# Patient Record
Sex: MALE | Race: WHITE | Employment: FULL TIME | URBAN - METROPOLITAN AREA
[De-identification: names, ages, dates, MRNs, and addresses within clinical notes are randomized per-mention and may not be internally consistent; named-entity substitution may affect disease eponyms.]

---

## 2017-01-06 DIAGNOSIS — J32.9 SINUSITIS, UNSPECIFIED CHRONICITY, UNSPECIFIED LOCATION: Primary | ICD-10-CM

## 2017-01-06 RX ORDER — FLUTICASONE PROPIONATE 50 MCG
2 SPRAY, SUSPENSION (ML) NASAL DAILY
Qty: 1 BOTTLE | Refills: 3 | Status: SHIPPED | OUTPATIENT
Start: 2017-01-06 | End: 2017-01-12

## 2017-01-06 RX ORDER — AZITHROMYCIN 250 MG/1
TABLET, FILM COATED ORAL
Qty: 6 TABLET | Refills: 0 | Status: SHIPPED | OUTPATIENT
Start: 2017-01-06 | End: 2017-01-12 | Stop reason: ALTCHOICE

## 2017-01-11 RX ORDER — ATORVASTATIN CALCIUM 40 MG/1
TABLET, FILM COATED ORAL
Qty: 90 TABLET | Refills: 0 | Status: SHIPPED | OUTPATIENT
Start: 2017-01-11 | End: 2017-04-17

## 2017-01-12 ENCOUNTER — OFFICE VISIT (OUTPATIENT)
Dept: FAMILY MEDICINE CLINIC | Facility: CLINIC | Age: 62
End: 2017-01-12

## 2017-01-12 VITALS
SYSTOLIC BLOOD PRESSURE: 164 MMHG | HEART RATE: 72 BPM | TEMPERATURE: 99 F | DIASTOLIC BLOOD PRESSURE: 100 MMHG | RESPIRATION RATE: 16 BRPM | WEIGHT: 216 LBS | BODY MASS INDEX: 30 KG/M2

## 2017-01-12 DIAGNOSIS — J01.01 ACUTE RECURRENT MAXILLARY SINUSITIS: Primary | ICD-10-CM

## 2017-01-12 PROBLEM — J01.00 SINUSITIS, ACUTE MAXILLARY: Status: ACTIVE | Noted: 2017-01-12

## 2017-01-12 PROCEDURE — 99213 OFFICE O/P EST LOW 20 MIN: CPT | Performed by: FAMILY MEDICINE

## 2017-01-12 RX ORDER — AZITHROMYCIN 250 MG/1
TABLET, FILM COATED ORAL
Qty: 6 TABLET | Refills: 0 | Status: SHIPPED | OUTPATIENT
Start: 2017-01-12 | End: 2017-01-26 | Stop reason: ALTCHOICE

## 2017-01-12 RX ORDER — METHYLPREDNISOLONE 4 MG/1
TABLET ORAL
Qty: 1 KIT | Refills: 0 | Status: SHIPPED | OUTPATIENT
Start: 2017-01-12 | End: 2017-01-26

## 2017-01-12 NOTE — PROGRESS NOTES
HPI:   Claudine Chávez is a 62year old male who presents for upper respiratory symptoms for  3  weeks. Patient reports congestion, low grade fever, sinus pain. Initially been treated with azithromycin and fluticasone nasal spray.   There was initial impro Pulse 72  Temp(Src) 98.7 °F (37.1 °C)  Resp 16  Wt 216 lb  GENERAL: well developed, well nourished,in no apparent distress  SKIN: no rashes noted  EYES:conjunctiva are clear  HEENT: Throat appears slightly red, right TM is normal, left TM is normal, there

## 2017-01-26 ENCOUNTER — APPOINTMENT (OUTPATIENT)
Dept: GENERAL RADIOLOGY | Age: 62
End: 2017-01-26
Attending: FAMILY MEDICINE
Payer: COMMERCIAL

## 2017-01-26 ENCOUNTER — HOSPITAL ENCOUNTER (OUTPATIENT)
Age: 62
Discharge: HOME | End: 2017-01-26
Attending: FAMILY MEDICINE
Payer: COMMERCIAL

## 2017-01-26 ENCOUNTER — OFFICE VISIT (OUTPATIENT)
Dept: FAMILY MEDICINE CLINIC | Facility: CLINIC | Age: 62
End: 2017-01-26

## 2017-01-26 VITALS
DIASTOLIC BLOOD PRESSURE: 98 MMHG | HEART RATE: 74 BPM | SYSTOLIC BLOOD PRESSURE: 160 MMHG | RESPIRATION RATE: 18 BRPM | TEMPERATURE: 98 F | OXYGEN SATURATION: 100 %

## 2017-01-26 DIAGNOSIS — Z02.9 ENCOUNTERS FOR ADMINISTRATIVE PURPOSE: Primary | ICD-10-CM

## 2017-01-26 DIAGNOSIS — S60.459A FOREIGN BODY OF FINGER OF RIGHT HAND, INITIAL ENCOUNTER: Primary | ICD-10-CM

## 2017-01-26 PROCEDURE — 64450 NJX AA&/STRD OTHER PN/BRANCH: CPT

## 2017-01-26 PROCEDURE — 99213 OFFICE O/P EST LOW 20 MIN: CPT

## 2017-01-26 PROCEDURE — 73140 X-RAY EXAM OF FINGER(S): CPT

## 2017-01-26 PROCEDURE — 99203 OFFICE O/P NEW LOW 30 MIN: CPT

## 2017-01-26 RX ORDER — AMOXICILLIN AND CLAVULANATE POTASSIUM 875; 125 MG/1; MG/1
1 TABLET, FILM COATED ORAL 2 TIMES DAILY
Qty: 10 TABLET | Refills: 0 | Status: SHIPPED | OUTPATIENT
Start: 2017-01-26 | End: 2017-01-31

## 2017-01-26 NOTE — PROGRESS NOTES
Pt presented with c/o metal splinter in nail of right finger. Reports he moved his hand in his car and felt it hit something. Saw small piece of metal protruding from nail; tried to remove but thought he might have pushed in deeper.   Reports finger pain

## 2017-01-27 ENCOUNTER — OFFICE VISIT (OUTPATIENT)
Dept: RHEUMATOLOGY | Facility: CLINIC | Age: 62
End: 2017-01-27

## 2017-01-27 VITALS
BODY MASS INDEX: 29.68 KG/M2 | RESPIRATION RATE: 16 BRPM | WEIGHT: 212 LBS | HEIGHT: 71 IN | DIASTOLIC BLOOD PRESSURE: 98 MMHG | HEART RATE: 74 BPM | SYSTOLIC BLOOD PRESSURE: 160 MMHG

## 2017-01-27 DIAGNOSIS — M18.0 PRIMARY OSTEOARTHRITIS OF BOTH FIRST CARPOMETACARPAL JOINTS: Primary | ICD-10-CM

## 2017-01-27 PROCEDURE — 99213 OFFICE O/P EST LOW 20 MIN: CPT | Performed by: INTERNAL MEDICINE

## 2017-01-27 RX ORDER — UBIDECARENONE/VITAMIN E MIXED 100 MG-100
1 CAPSULE ORAL DAILY
COMMUNITY
End: 2019-01-24

## 2017-01-27 RX ORDER — ETODOLAC 500 MG/1
500 TABLET, FILM COATED ORAL 2 TIMES DAILY
Qty: 180 TABLET | Refills: 3 | Status: SHIPPED | OUTPATIENT
Start: 2017-01-27 | End: 2018-02-01

## 2017-01-27 RX ORDER — DOXEPIN HYDROCHLORIDE 50 MG/1
1 CAPSULE ORAL DAILY
Status: ON HOLD | COMMUNITY
End: 2021-06-26

## 2017-01-27 NOTE — PROGRESS NOTES
[facility]  Dr. Johnna Mohan Progress Note     Subjective:   Myrna Woods is a(n) 62year old male. Current complaints: Patient presents with:  Osteoarthritis: 1 year f/u. Pt states bilateral thumb pain continues especially left thumb.   Pt was in ER y

## 2017-01-27 NOTE — ED INITIAL ASSESSMENT (HPI)
States moved his hand outward rapidly, struck something in his car console. Later today noticed discomfort to right index fingernail edge. Dark spot visible under nail. Here for possible foreign body.

## 2017-01-27 NOTE — PATIENT INSTRUCTIONS
Current plan is to continue  etodolac 500 mg twice a day for treatment of osteoarthritis of the thumbs. There is no other medicine I guarantees regrowth of cartilage.   You could take over-the-counter glucosamine/chondroitin but even that has controversy w

## 2017-01-27 NOTE — ED PROVIDER NOTES
Patient Seen in: THE [facility] In [address] [place] [address],[address]    History   Patient presents with:  Finger Pain    Stated Complaint: R - finger F.B.    HPI  62year old male with possible foreign body in the right index finger , pt says earlier today while in the Yes                Comment: 9 drinks a week       Review of Systems    Positive for stated complaint: R - finger F.B. Other systems are as noted in HPI. Constitutional and vital signs reviewed.       All other systems reviewed and negative except as noted There is a curvilinear density which appears to be separate from the distal phalanx of the index finger and is either within the soft tissues adjacent to the finger nail or the nail itself. No fracture. No other opaque foreign bodies are demonstrated.  Inci Tab  Take 1 tablet by mouth 2 (two) times daily.   Qty: 10 tablet Refills: 0

## 2017-01-30 RX ORDER — ETODOLAC 500 MG/1
TABLET, FILM COATED ORAL
Qty: 180 TABLET | Refills: 0 | OUTPATIENT
Start: 2017-01-30

## 2017-02-16 RX ORDER — METOPROLOL SUCCINATE 50 MG/1
TABLET, EXTENDED RELEASE ORAL
Qty: 90 TABLET | Refills: 0 | Status: SHIPPED | OUTPATIENT
Start: 2017-02-16 | End: 2017-05-14

## 2017-04-18 RX ORDER — ATORVASTATIN CALCIUM 40 MG/1
TABLET, FILM COATED ORAL
Qty: 90 TABLET | Refills: 0 | Status: SHIPPED | OUTPATIENT
Start: 2017-04-18 | End: 2017-07-19

## 2017-05-08 ENCOUNTER — TELEPHONE (OUTPATIENT)
Dept: FAMILY MEDICINE CLINIC | Facility: CLINIC | Age: 62
End: 2017-05-08

## 2017-05-08 DIAGNOSIS — E78.5 HYPERLIPIDEMIA, UNSPECIFIED HYPERLIPIDEMIA TYPE: Primary | ICD-10-CM

## 2017-05-08 NOTE — TELEPHONE ENCOUNTER
LOV with Dr. Sawyer Son dated 3/00/31 with BP of 160/98. LOV with Dr. Laurent Bettencourt, 1/12/17 - 164/100. Please call for nurse BP check.

## 2017-05-15 RX ORDER — METOPROLOL SUCCINATE 50 MG/1
TABLET, EXTENDED RELEASE ORAL
Qty: 90 TABLET | Refills: 0 | Status: SHIPPED | OUTPATIENT
Start: 2017-05-15 | End: 2017-08-16

## 2017-05-18 ENCOUNTER — OFFICE VISIT (OUTPATIENT)
Dept: FAMILY MEDICINE CLINIC | Facility: CLINIC | Age: 62
End: 2017-05-18

## 2017-05-18 VITALS
DIASTOLIC BLOOD PRESSURE: 82 MMHG | WEIGHT: 218 LBS | HEART RATE: 72 BPM | TEMPERATURE: 98 F | RESPIRATION RATE: 16 BRPM | SYSTOLIC BLOOD PRESSURE: 136 MMHG | BODY MASS INDEX: 30.52 KG/M2 | HEIGHT: 71 IN

## 2017-05-18 DIAGNOSIS — Z12.5 PROSTATE CANCER SCREENING: ICD-10-CM

## 2017-05-18 DIAGNOSIS — G47.33 OSA ON CPAP: ICD-10-CM

## 2017-05-18 DIAGNOSIS — Z99.89 OSA ON CPAP: ICD-10-CM

## 2017-05-18 DIAGNOSIS — K21.9 GASTROESOPHAGEAL REFLUX DISEASE WITHOUT ESOPHAGITIS: ICD-10-CM

## 2017-05-18 DIAGNOSIS — I10 ESSENTIAL HYPERTENSION: Primary | ICD-10-CM

## 2017-05-18 DIAGNOSIS — E78.5 HYPERLIPIDEMIA, UNSPECIFIED HYPERLIPIDEMIA TYPE: ICD-10-CM

## 2017-05-18 PROCEDURE — 99214 OFFICE O/P EST MOD 30 MIN: CPT | Performed by: FAMILY MEDICINE

## 2017-05-18 NOTE — PROGRESS NOTES
HPI:   Santa Worthy is a 62year old male who presents for recheck of hyperlipidemia. Patient reports taking medications as instructed. He has had no obvious side effects. Patient presents for recheck of his hypertension.  Pt has been taking medications Omega-3 Fatty Acids (FISH OIL) 1000 MG Oral Capsule Delayed Release Take 1 capsule by mouth daily.  Disp:  Rfl: 0   Pantoprazole Sodium (PROTONIX) 40 MG Oral Tab EC TAKE 1 TABLET BY MOUTH EVERY MORNING BEFORE BREAKFAST AS NEEDED Disp: 90 tablet Rfl: 0 cpap  Prostate cancer screening      Orders Placed This Encounter  CBC W Differential W Platelet  Comp Metabolic Panel (14)  Lipid Panel  PSA    Meds & Refills for this Visit:    No prescriptions requested or ordered in this encounter  Continue previous me

## 2017-07-19 RX ORDER — ATORVASTATIN CALCIUM 40 MG/1
TABLET, FILM COATED ORAL
Qty: 90 TABLET | Refills: 0 | Status: SHIPPED | OUTPATIENT
Start: 2017-07-19 | End: 2017-10-15

## 2017-08-17 RX ORDER — METOPROLOL SUCCINATE 50 MG/1
TABLET, EXTENDED RELEASE ORAL
Qty: 90 TABLET | Refills: 0 | Status: SHIPPED | OUTPATIENT
Start: 2017-08-17 | End: 2017-11-12

## 2017-09-14 RX ORDER — PANTOPRAZOLE SODIUM 40 MG/1
TABLET, DELAYED RELEASE ORAL
Qty: 90 TABLET | Refills: 1 | Status: SHIPPED | OUTPATIENT
Start: 2017-09-14 | End: 2018-02-02

## 2017-10-16 RX ORDER — ATORVASTATIN CALCIUM 40 MG/1
TABLET, FILM COATED ORAL
Qty: 90 TABLET | Refills: 1 | Status: SHIPPED | OUTPATIENT
Start: 2017-10-16 | End: 2018-04-26

## 2017-11-13 RX ORDER — METOPROLOL SUCCINATE 50 MG/1
TABLET, EXTENDED RELEASE ORAL
Qty: 90 TABLET | Refills: 0 | Status: SHIPPED | OUTPATIENT
Start: 2017-11-13 | End: 2017-12-21

## 2017-12-21 ENCOUNTER — PATIENT MESSAGE (OUTPATIENT)
Dept: FAMILY MEDICINE CLINIC | Facility: CLINIC | Age: 62
End: 2017-12-21

## 2017-12-21 ENCOUNTER — OFFICE VISIT (OUTPATIENT)
Dept: FAMILY MEDICINE CLINIC | Facility: CLINIC | Age: 62
End: 2017-12-21

## 2017-12-21 DIAGNOSIS — I10 ESSENTIAL HYPERTENSION: ICD-10-CM

## 2017-12-21 DIAGNOSIS — Z99.89 OSA ON CPAP: ICD-10-CM

## 2017-12-21 DIAGNOSIS — R17 ELEVATED BILIRUBIN: ICD-10-CM

## 2017-12-21 DIAGNOSIS — E66.9 OBESITY (BMI 30-39.9): ICD-10-CM

## 2017-12-21 DIAGNOSIS — Z13.89 SCREENING FOR GENITOURINARY CONDITION: ICD-10-CM

## 2017-12-21 DIAGNOSIS — N52.9 ERECTILE DYSFUNCTION, UNSPECIFIED ERECTILE DYSFUNCTION TYPE: ICD-10-CM

## 2017-12-21 DIAGNOSIS — G47.33 OSA ON CPAP: ICD-10-CM

## 2017-12-21 DIAGNOSIS — Z12.11 ENCOUNTER FOR SCREENING FECAL OCCULT BLOOD TESTING: ICD-10-CM

## 2017-12-21 DIAGNOSIS — Z00.00 ANNUAL PHYSICAL EXAM: Primary | ICD-10-CM

## 2017-12-21 DIAGNOSIS — L82.1 SEBORRHEIC KERATOSIS: ICD-10-CM

## 2017-12-21 DIAGNOSIS — R82.90 ABNORMAL URINE: ICD-10-CM

## 2017-12-21 DIAGNOSIS — Z23 NEED FOR VACCINATION: ICD-10-CM

## 2017-12-21 PROBLEM — J01.00 SINUSITIS, ACUTE MAXILLARY: Status: RESOLVED | Noted: 2017-01-12 | Resolved: 2017-12-21

## 2017-12-21 PROBLEM — J32.9 SINUSITIS: Status: RESOLVED | Noted: 2017-01-06 | Resolved: 2017-12-21

## 2017-12-21 PROCEDURE — 99396 PREV VISIT EST AGE 40-64: CPT | Performed by: NURSE PRACTITIONER

## 2017-12-21 PROCEDURE — 90471 IMMUNIZATION ADMIN: CPT | Performed by: NURSE PRACTITIONER

## 2017-12-21 PROCEDURE — 90686 IIV4 VACC NO PRSV 0.5 ML IM: CPT | Performed by: NURSE PRACTITIONER

## 2017-12-21 PROCEDURE — 81003 URINALYSIS AUTO W/O SCOPE: CPT | Performed by: NURSE PRACTITIONER

## 2017-12-21 PROCEDURE — 17110 DESTRUCTION B9 LES UP TO 14: CPT | Performed by: NURSE PRACTITIONER

## 2017-12-21 RX ORDER — METOPROLOL SUCCINATE 50 MG/1
50 TABLET, EXTENDED RELEASE ORAL
Qty: 90 TABLET | Refills: 0 | Status: SHIPPED | OUTPATIENT
Start: 2017-12-21 | End: 2018-06-03

## 2017-12-21 RX ORDER — SILDENAFIL 100 MG/1
100 TABLET, FILM COATED ORAL
Qty: 2 TABLET | Refills: 0 | COMMUNITY
Start: 2017-12-21 | End: 2018-05-31 | Stop reason: DRUGHIGH

## 2017-12-21 RX ORDER — SILDENAFIL 50 MG/1
50 TABLET, FILM COATED ORAL
Qty: 2 TABLET | Refills: 0 | Status: ON HOLD | COMMUNITY
Start: 2017-12-21 | End: 2018-12-17

## 2017-12-21 NOTE — H&P
Kiersten Mcdonnell is a 65year old male who presents for a complete physical exam.   HPI:   Pt denies any complaints. He reports his last colonoscopy was in 2016- and he is due in 2021 (5 year). He denies any changes in urination.  He does report erectile dys CHLORIDE 104 98 - 110 mmol/L   CARBON DIOXIDE 26 19 - 30 mmol/L   CALCIUM 9.1 8.6 - 10.3 mg/dL   PROTEIN, TOTAL 6.6 6.1 - 8.1 g/dL   ALBUMIN 4.2 3.6 - 5.1 g/dL   GLOBULIN 2.4 1.9 - 3.7 g/dL (calc)   ALBUMIN/GLOBULIN RATIO 1.8 1.0 - 2.5 (calc)   BILIRUBIN MD;  Location: [address] [place] [address]   Family History   Problem Relation Age of Onset   • Colon Cancer Father 79   • Cancer Father      started as colon cancer-moved to bone then tumor on kidney   • Cancer Mother      breast cancer.  mets voice  SKIN: no rashes, no suspicious moles. (1) seborrheic keratosis noted to pt's mid back. The risks of cryotherapy were discussed with the patient including scarring, blistering, infection and hypopigmentation of the skin.  Verbal consent given by the p days a week for cardiovascular fitness and 45-60 minutes 6-7 days a week for weight loss. Advised testicular self exam once a month. There are no Patient Instructions on file for this visit. No orders of the defined types were placed in this encounter. for any side effects of medication. 4. Erectile dysfunction, unspecified erectile dysfunction type  2 samples of Viagra were provided to the patient. Pt instructed on use of medication. 5. SHIV on CPAP  Continue wearing CPAP. 6. Obesity (30-39. 9)

## 2017-12-22 VITALS
SYSTOLIC BLOOD PRESSURE: 117 MMHG | HEART RATE: 80 BPM | BODY MASS INDEX: 30.52 KG/M2 | DIASTOLIC BLOOD PRESSURE: 86 MMHG | HEIGHT: 71 IN | WEIGHT: 218 LBS | TEMPERATURE: 98 F | RESPIRATION RATE: 18 BRPM

## 2017-12-22 NOTE — TELEPHONE ENCOUNTER
From: Cele Maki  To: SHELLY Wilson  Sent: 12/21/2017 7:59 PM CST  Subject: Visit Follow-up Question    My blood pressure this evening was 117/86.   Bar Kinney

## 2018-01-14 ENCOUNTER — PATIENT MESSAGE (OUTPATIENT)
Dept: FAMILY MEDICINE CLINIC | Facility: CLINIC | Age: 63
End: 2018-01-14

## 2018-01-17 NOTE — TELEPHONE ENCOUNTER
Spoke with pt per Eladio. Informed pt to repeat urinanalysis in two week and all other labs are within normal limits. Will be sending a copy of lab order via mail. Pt voiced understanding no other questions or concerns at this time.

## 2018-02-01 ENCOUNTER — OFFICE VISIT (OUTPATIENT)
Dept: RHEUMATOLOGY | Facility: CLINIC | Age: 63
End: 2018-02-01

## 2018-02-01 VITALS
WEIGHT: 220 LBS | HEART RATE: 60 BPM | BODY MASS INDEX: 31 KG/M2 | SYSTOLIC BLOOD PRESSURE: 138 MMHG | RESPIRATION RATE: 16 BRPM | DIASTOLIC BLOOD PRESSURE: 80 MMHG

## 2018-02-01 DIAGNOSIS — M18.0 PRIMARY OSTEOARTHRITIS OF BOTH FIRST CARPOMETACARPAL JOINTS: Primary | ICD-10-CM

## 2018-02-01 PROCEDURE — 99213 OFFICE O/P EST LOW 20 MIN: CPT | Performed by: INTERNAL MEDICINE

## 2018-02-01 RX ORDER — ETODOLAC 500 MG/1
500 TABLET, FILM COATED ORAL 2 TIMES DAILY
Qty: 180 TABLET | Refills: 3 | Status: SHIPPED | OUTPATIENT
Start: 2018-02-01 | End: 2018-04-25

## 2018-02-01 NOTE — PROGRESS NOTES
[facility]  Dr. Kelvin Rivera Progress Note     Subjective:   Lee Kimble is a(n) 65year old male. Current complaints: Patient presents with:  Osteoarthritis: 1 year f/u. Pt states 'is doing the same.'   In the fall had a bad stretch.   Trying to ayla

## 2018-02-01 NOTE — PATIENT INSTRUCTIONS
Continue Etodolac 500 mg twice a day. Use your thumb splints. Exercise regularly. Avoid stressing your joints. Return to office 1 year.

## 2018-02-02 RX ORDER — PANTOPRAZOLE SODIUM 40 MG/1
TABLET, DELAYED RELEASE ORAL
Qty: 90 TABLET | Refills: 0 | Status: SHIPPED | OUTPATIENT
Start: 2018-02-02 | End: 2018-12-26

## 2018-02-12 RX ORDER — METOPROLOL SUCCINATE 50 MG/1
TABLET, EXTENDED RELEASE ORAL
Qty: 90 TABLET | Refills: 0 | Status: SHIPPED | OUTPATIENT
Start: 2018-02-12 | End: 2018-04-26

## 2018-02-19 ENCOUNTER — TELEPHONE (OUTPATIENT)
Dept: FAMILY MEDICINE CLINIC | Facility: CLINIC | Age: 63
End: 2018-02-19

## 2018-02-19 DIAGNOSIS — R82.90 ABNORMAL URINE: Primary | ICD-10-CM

## 2018-02-19 NOTE — TELEPHONE ENCOUNTER
Received patient's UA results from [facility]. Urobilinogen noted to be high. Otherwise, unremarkable UA. Discussed with Dr. Bruce Mora. Would like patient to have a hepatitis panel drawn and an US of his abdomen-to rule out fatty liver. Orders placed.

## 2018-02-19 NOTE — TELEPHONE ENCOUNTER
Call to pt's cell reaches identified voice mail. Left m req call back to triage nurse for test results and dr instructions. Provided ofc phone hours.

## 2018-02-20 NOTE — TELEPHONE ENCOUNTER
Incoming call from patient, information and recommendations given, understanding verbalized, no further questions. Requests lab orders be mailed to his home.   Central scheduling contact number provided

## 2018-03-01 ENCOUNTER — HOSPITAL ENCOUNTER (OUTPATIENT)
Dept: ULTRASOUND IMAGING | Facility: HOSPITAL | Age: 63
Discharge: HOME | End: 2018-03-01
Attending: NURSE PRACTITIONER
Payer: COMMERCIAL

## 2018-03-01 DIAGNOSIS — R82.90 ABNORMAL URINE: ICD-10-CM

## 2018-03-01 PROCEDURE — 76700 US EXAM ABDOM COMPLETE: CPT | Performed by: NURSE PRACTITIONER

## 2018-03-06 ENCOUNTER — TELEPHONE (OUTPATIENT)
Dept: FAMILY MEDICINE CLINIC | Facility: CLINIC | Age: 63
End: 2018-03-06

## 2018-03-06 ENCOUNTER — PATIENT MESSAGE (OUTPATIENT)
Dept: FAMILY MEDICINE CLINIC | Facility: CLINIC | Age: 63
End: 2018-03-06

## 2018-03-06 NOTE — TELEPHONE ENCOUNTER
Received patient's lab work he had completed with [facility]. Hepatitis panel is negative. No evidence of hepatitis A or hepatitis B infection.

## 2018-03-15 ENCOUNTER — HOSPITAL ENCOUNTER (OUTPATIENT)
Dept: CV DIAGNOSTICS | Facility: HOSPITAL | Age: 63
Discharge: HOME | End: 2018-03-15
Attending: FAMILY MEDICINE
Payer: COMMERCIAL

## 2018-03-15 DIAGNOSIS — I71.4 ABDOMINAL AORTIC ANEURYSM (AAA) 35 TO 39 MM IN DIAMETER (HCC): ICD-10-CM

## 2018-03-15 DIAGNOSIS — R93.89 ABNORMAL ULTRASOUND: ICD-10-CM

## 2018-03-15 PROCEDURE — 93306 TTE W/DOPPLER COMPLETE: CPT | Performed by: FAMILY MEDICINE

## 2018-04-05 ENCOUNTER — PATIENT MESSAGE (OUTPATIENT)
Dept: FAMILY MEDICINE CLINIC | Facility: CLINIC | Age: 63
End: 2018-04-05

## 2018-04-05 DIAGNOSIS — E78.5 HYPERLIPIDEMIA, UNSPECIFIED HYPERLIPIDEMIA TYPE: Primary | ICD-10-CM

## 2018-04-05 NOTE — TELEPHONE ENCOUNTER
From: Ana Rosa Wilson  To: Viji Dobbs MD  Sent: 4/5/2018 6:45 AM CDT  Subject: Non-Urgent Medical Question    Bety Dominguez, please mail me my Lipid Panel order for my upcoming blood test.    Thanks,   Dayanna Clayton

## 2018-04-05 NOTE — TELEPHONE ENCOUNTER
Pt is requesting an order for a lipid panel be mailed to him. The order is pended for review.   Thank you

## 2018-04-20 ENCOUNTER — TELEPHONE (OUTPATIENT)
Dept: FAMILY MEDICINE CLINIC | Facility: CLINIC | Age: 63
End: 2018-04-20

## 2018-04-25 ENCOUNTER — TELEPHONE (OUTPATIENT)
Dept: RHEUMATOLOGY | Facility: CLINIC | Age: 63
End: 2018-04-25

## 2018-04-25 RX ORDER — ETODOLAC 500 MG/1
500 TABLET, FILM COATED ORAL 2 TIMES DAILY
Qty: 180 TABLET | Refills: 0 | Status: SHIPPED | OUTPATIENT
Start: 2018-04-25 | End: 2018-06-19

## 2018-04-25 NOTE — TELEPHONE ENCOUNTER
Rec'd refill request from [facility] for etodolac. Med sent.  mp     Future Appointments  Date Time Provider Kori Mendoza   5/92/7116 0:73 PM Alejandro Simmons MD [facility] MEGHANN Ron

## 2018-04-26 ENCOUNTER — TELEPHONE (OUTPATIENT)
Dept: FAMILY MEDICINE CLINIC | Facility: CLINIC | Age: 63
End: 2018-04-26

## 2018-04-26 RX ORDER — METOPROLOL SUCCINATE 50 MG/1
50 TABLET, EXTENDED RELEASE ORAL
Qty: 90 TABLET | Refills: 0 | Status: SHIPPED | OUTPATIENT
Start: 2018-04-26 | End: 2018-05-31

## 2018-04-26 RX ORDER — ATORVASTATIN CALCIUM 40 MG/1
40 TABLET, FILM COATED ORAL
Qty: 90 TABLET | Refills: 0 | Status: SHIPPED | OUTPATIENT
Start: 2018-04-26 | End: 2018-06-19

## 2018-05-31 ENCOUNTER — OFFICE VISIT (OUTPATIENT)
Dept: FAMILY MEDICINE CLINIC | Facility: CLINIC | Age: 63
End: 2018-05-31

## 2018-05-31 VITALS
TEMPERATURE: 99 F | HEART RATE: 64 BPM | DIASTOLIC BLOOD PRESSURE: 104 MMHG | BODY MASS INDEX: 31 KG/M2 | SYSTOLIC BLOOD PRESSURE: 170 MMHG | RESPIRATION RATE: 16 BRPM | WEIGHT: 220 LBS

## 2018-05-31 DIAGNOSIS — I10 ESSENTIAL HYPERTENSION: Primary | ICD-10-CM

## 2018-05-31 DIAGNOSIS — F41.9 ANXIETY: ICD-10-CM

## 2018-05-31 DIAGNOSIS — S46.911A STRAIN OF RIGHT SHOULDER, INITIAL ENCOUNTER: ICD-10-CM

## 2018-05-31 PROCEDURE — 99214 OFFICE O/P EST MOD 30 MIN: CPT | Performed by: FAMILY MEDICINE

## 2018-05-31 RX ORDER — METHYLPREDNISOLONE 4 MG/1
TABLET ORAL
Qty: 1 KIT | Refills: 0 | Status: SHIPPED | OUTPATIENT
Start: 2018-05-31 | End: 2018-12-04

## 2018-05-31 RX ORDER — ALPRAZOLAM 0.25 MG/1
0.25 TABLET ORAL EVERY 6 HOURS PRN
Qty: 30 TABLET | Refills: 1 | Status: SHIPPED | OUTPATIENT
Start: 2018-05-31 | End: 2019-01-07

## 2018-05-31 NOTE — PROGRESS NOTES
HPI:   Sandy Hancock is a 65year old male who presents for recheck of hyperlipidemia. Patient reports taking medications as instructed. He has had no obvious side effects. Patient presents for recheck of his hypertension.  Pt has been taking medications Tab Take 1 tablet (500 mg total) by mouth 2 (two) times daily.  Disp: 180 tablet Rfl: 0   PANTOPRAZOLE SODIUM 40 MG Oral Tab EC TAKE 1 TABLET BY MOUTH EVERY DAY (Patient taking differently: TAKE 1 TABLET  AS NEEDED) Disp: 90 tablet Rfl: 0   Sildenafil [facility] GENERAL: well developed, well nourished,in no apparent distress  SKIN: no rashes,no suspicious lesions  HEENT: atraumatic, normocephalic,ears and throat are clear  NECK: supple,no adenopathy,no bruits  LUNGS: clear to auscultation  CARDIO: RRR without mu

## 2018-06-03 RX ORDER — METOPROLOL SUCCINATE 50 MG/1
100 TABLET, EXTENDED RELEASE ORAL DAILY
Qty: 90 TABLET | Refills: 0 | Status: SHIPPED
Start: 2018-05-31 | End: 2018-06-04

## 2018-06-04 DIAGNOSIS — I10 ESSENTIAL HYPERTENSION: ICD-10-CM

## 2018-06-04 RX ORDER — METOPROLOL SUCCINATE 50 MG/1
TABLET, EXTENDED RELEASE ORAL
Qty: 180 TABLET | Refills: 1 | Status: SHIPPED | OUTPATIENT
Start: 2018-06-04 | End: 2018-11-24

## 2018-06-19 RX ORDER — ETODOLAC 500 MG/1
TABLET, FILM COATED ORAL
Qty: 180 TABLET | Refills: 2 | Status: SHIPPED | OUTPATIENT
Start: 2018-06-19 | End: 2018-08-02 | Stop reason: ALTCHOICE

## 2018-06-20 RX ORDER — METOPROLOL SUCCINATE 50 MG/1
TABLET, EXTENDED RELEASE ORAL
Qty: 90 TABLET | Refills: 1 | Status: SHIPPED | OUTPATIENT
Start: 2018-06-20 | End: 2018-10-18 | Stop reason: DRUGHIGH

## 2018-06-20 RX ORDER — ATORVASTATIN CALCIUM 40 MG/1
TABLET, FILM COATED ORAL
Qty: 90 TABLET | Refills: 1 | Status: SHIPPED | OUTPATIENT
Start: 2018-06-20 | End: 2018-07-30

## 2018-06-20 NOTE — TELEPHONE ENCOUNTER
Pt had cmp 12/2017, lipid in 10/5/17, both scanned in. Protocol would pass. Pt is due for vs in 6 mos per last ov note 5/31/18. Rx's sent.

## 2018-07-30 RX ORDER — ATORVASTATIN CALCIUM 40 MG/1
TABLET, FILM COATED ORAL
Qty: 90 TABLET | Refills: 0 | Status: SHIPPED | OUTPATIENT
Start: 2018-07-30 | End: 2018-10-27

## 2018-08-02 RX ORDER — CELECOXIB 200 MG/1
200 CAPSULE ORAL DAILY
Qty: 30 CAPSULE | Refills: 2 | Status: SHIPPED | OUTPATIENT
Start: 2018-08-02 | End: 2018-10-27

## 2018-10-06 ENCOUNTER — HOSPITAL ENCOUNTER (OUTPATIENT)
Dept: CT IMAGING | Facility: HOSPITAL | Age: 63
Discharge: HOME | End: 2018-10-06
Attending: FAMILY MEDICINE
Payer: COMMERCIAL

## 2018-10-06 DIAGNOSIS — I71.4 ABDOMINAL AORTIC ANEURYSM (AAA) 35 TO 39 MM IN DIAMETER (HCC): ICD-10-CM

## 2018-10-06 DIAGNOSIS — R93.89 ABNORMAL ULTRASOUND: ICD-10-CM

## 2018-10-06 PROCEDURE — 74174 CTA ABD&PLVS W/CONTRAST: CPT | Performed by: FAMILY MEDICINE

## 2018-10-06 PROCEDURE — 82565 ASSAY OF CREATININE: CPT

## 2018-10-16 ENCOUNTER — PRIOR ORIGINAL RECORDS (OUTPATIENT)
Dept: OTHER | Age: 63
End: 2018-10-16

## 2018-10-16 ENCOUNTER — MYAURORA ACCOUNT LINK (OUTPATIENT)
Dept: OTHER | Age: 63
End: 2018-10-16

## 2018-10-18 ENCOUNTER — TELEPHONE (OUTPATIENT)
Dept: FAMILY MEDICINE CLINIC | Facility: CLINIC | Age: 63
End: 2018-10-18

## 2018-10-18 DIAGNOSIS — E78.5 HYPERLIPIDEMIA, UNSPECIFIED HYPERLIPIDEMIA TYPE: Primary | ICD-10-CM

## 2018-10-18 DIAGNOSIS — I10 ESSENTIAL HYPERTENSION: ICD-10-CM

## 2018-10-18 RX ORDER — METOPROLOL SUCCINATE 50 MG/1
TABLET, EXTENDED RELEASE ORAL
Qty: 180 TABLET | Refills: 1 | Status: CANCELLED | OUTPATIENT
Start: 2018-10-18

## 2018-10-18 RX ORDER — ATORVASTATIN CALCIUM 40 MG/1
40 TABLET, FILM COATED ORAL
Qty: 90 TABLET | Refills: 0 | Status: CANCELLED | OUTPATIENT
Start: 2018-10-18

## 2018-10-18 RX ORDER — CELECOXIB 200 MG/1
200 CAPSULE ORAL DAILY
Qty: 90 CAPSULE | Refills: 0 | Status: CANCELLED | OUTPATIENT
Start: 2018-10-18 | End: 2019-01-16

## 2018-10-18 NOTE — TELEPHONE ENCOUNTER
T.C. To pt. Dr. Yaw Lopez reviewed Pt.s labs from 10/12/18. His LDL is down to 80. Pt was insrtucted to continue the medication and repeat a CMP and lipid panel in 6 months. Pt agreed to plan and verbalized understanding. Orders for labs placed.  Pt also re

## 2018-10-29 RX ORDER — ATORVASTATIN CALCIUM 40 MG/1
TABLET, FILM COATED ORAL
Qty: 90 TABLET | Refills: 0 | Status: SHIPPED | OUTPATIENT
Start: 2018-10-29 | End: 2019-01-21

## 2018-10-29 RX ORDER — CELECOXIB 200 MG/1
CAPSULE ORAL
Qty: 30 CAPSULE | Refills: 1 | Status: ON HOLD | OUTPATIENT
Start: 2018-10-29 | End: 2018-12-21

## 2018-10-29 NOTE — TELEPHONE ENCOUNTER
Labs 10/18-scanned in, protocol would pass. Due to see you end of next month for meds. Celebrex not on protocol.

## 2018-11-10 ENCOUNTER — HOSPITAL ENCOUNTER (OUTPATIENT)
Dept: CV DIAGNOSTICS | Facility: HOSPITAL | Age: 63
Discharge: HOME | End: 2018-11-10
Attending: INTERNAL MEDICINE
Payer: COMMERCIAL

## 2018-11-10 DIAGNOSIS — I10 HTN (HYPERTENSION): ICD-10-CM

## 2018-11-10 DIAGNOSIS — R07.89 ATYPICAL CHEST PAIN: ICD-10-CM

## 2018-11-10 DIAGNOSIS — Z82.49 FH: CAD (CORONARY ARTERY DISEASE): ICD-10-CM

## 2018-11-10 PROCEDURE — 78452 HT MUSCLE IMAGE SPECT MULT: CPT | Performed by: INTERNAL MEDICINE

## 2018-11-10 PROCEDURE — 93017 CV STRESS TEST TRACING ONLY: CPT | Performed by: INTERNAL MEDICINE

## 2018-11-10 PROCEDURE — 93018 CV STRESS TEST I&R ONLY: CPT | Performed by: INTERNAL MEDICINE

## 2018-11-14 ENCOUNTER — PRIOR ORIGINAL RECORDS (OUTPATIENT)
Dept: OTHER | Age: 63
End: 2018-11-14

## 2018-11-24 DIAGNOSIS — I10 ESSENTIAL HYPERTENSION: ICD-10-CM

## 2018-11-26 RX ORDER — METOPROLOL SUCCINATE 50 MG/1
TABLET, EXTENDED RELEASE ORAL
Qty: 180 TABLET | Refills: 0 | Status: ON HOLD | OUTPATIENT
Start: 2018-11-26 | End: 2018-12-21

## 2018-11-30 ENCOUNTER — PRIOR ORIGINAL RECORDS (OUTPATIENT)
Dept: OTHER | Age: 63
End: 2018-11-30

## 2018-12-01 ENCOUNTER — HOSPITAL ENCOUNTER (OUTPATIENT)
Dept: GENERAL RADIOLOGY | Age: 63
Discharge: HOME | End: 2018-12-01
Attending: INTERNAL MEDICINE
Payer: COMMERCIAL

## 2018-12-01 DIAGNOSIS — Z01.818 PREOPERATIVE TESTING: ICD-10-CM

## 2018-12-01 PROCEDURE — 71046 X-RAY EXAM CHEST 2 VIEWS: CPT | Performed by: INTERNAL MEDICINE

## 2018-12-04 NOTE — HISTORICAL OFFICE NOTE
Pamela Naveed  : 1955  ACCOUNT:  [id number]  [phone]  PCP: Dr. Alison Jones     TODAY'S DATE: 10/16/2018  DICTATED BY:  [Dr. Eryn Shore: [Followup of Aneurysm, abdominal, Followup of Chest pain, precordial and high blo rhythm, normal S1, S2 without S3; no pathologic murmurs. CAROTIDS: carotid pulses normal. ABD AORTA: abdominal aorta not enlarged. FEM: femoral pulses intact. PEDAL: pedal pulses intact. EXT: no peripheral edema.      DECISION MAKING:    []    ASSESSMENT:

## 2018-12-07 ENCOUNTER — HOSPITAL ENCOUNTER (OUTPATIENT)
Dept: INTERVENTIONAL RADIOLOGY/VASCULAR | Facility: HOSPITAL | Age: 63
Discharge: HOME | End: 2018-12-07
Attending: INTERNAL MEDICINE | Admitting: INTERNAL MEDICINE
Payer: COMMERCIAL

## 2018-12-07 VITALS
TEMPERATURE: 98 F | RESPIRATION RATE: 20 BRPM | HEART RATE: 54 BPM | WEIGHT: 220 LBS | SYSTOLIC BLOOD PRESSURE: 118 MMHG | DIASTOLIC BLOOD PRESSURE: 70 MMHG | HEIGHT: 71 IN | BODY MASS INDEX: 30.8 KG/M2 | OXYGEN SATURATION: 97 %

## 2018-12-07 DIAGNOSIS — R07.9 CHEST PAIN: ICD-10-CM

## 2018-12-07 PROCEDURE — 4A023N7 MEASUREMENT OF CARDIAC SAMPLING AND PRESSURE, LEFT HEART, PERCUTANEOUS APPROACH: ICD-10-PCS | Performed by: INTERNAL MEDICINE

## 2018-12-07 PROCEDURE — 93458 L HRT ARTERY/VENTRICLE ANGIO: CPT | Performed by: INTERNAL MEDICINE

## 2018-12-07 PROCEDURE — 92978 ENDOLUMINL IVUS OCT C 1ST: CPT | Performed by: INTERNAL MEDICINE

## 2018-12-07 PROCEDURE — 99152 MOD SED SAME PHYS/QHP 5/>YRS: CPT

## 2018-12-07 PROCEDURE — 93458 L HRT ARTERY/VENTRICLE ANGIO: CPT

## 2018-12-07 PROCEDURE — B2151ZZ FLUOROSCOPY OF LEFT HEART USING LOW OSMOLAR CONTRAST: ICD-10-PCS | Performed by: INTERNAL MEDICINE

## 2018-12-07 PROCEDURE — B2111ZZ FLUOROSCOPY OF MULTIPLE CORONARY ARTERIES USING LOW OSMOLAR CONTRAST: ICD-10-PCS | Performed by: INTERNAL MEDICINE

## 2018-12-07 PROCEDURE — 92978 ENDOLUMINL IVUS OCT C 1ST: CPT

## 2018-12-07 PROCEDURE — 99153 MOD SED SAME PHYS/QHP EA: CPT

## 2018-12-07 PROCEDURE — 92979 ENDOLUMINL IVUS OCT C EA: CPT

## 2018-12-07 RX ORDER — LIDOCAINE HYDROCHLORIDE 10 MG/ML
INJECTION, SOLUTION EPIDURAL; INFILTRATION; INTRACAUDAL; PERINEURAL
Status: COMPLETED
Start: 2018-12-07 | End: 2018-12-07

## 2018-12-07 RX ORDER — SODIUM CHLORIDE 9 MG/ML
INJECTION, SOLUTION INTRAVENOUS CONTINUOUS
Status: DISCONTINUED | OUTPATIENT
Start: 2018-12-07 | End: 2018-12-07

## 2018-12-07 RX ORDER — MIDAZOLAM HYDROCHLORIDE 1 MG/ML
INJECTION INTRAMUSCULAR; INTRAVENOUS
Status: COMPLETED
Start: 2018-12-07 | End: 2018-12-07

## 2018-12-07 RX ORDER — HEPARIN SODIUM 5000 [USP'U]/ML
INJECTION, SOLUTION INTRAVENOUS; SUBCUTANEOUS
Status: COMPLETED
Start: 2018-12-07 | End: 2018-12-07

## 2018-12-07 RX ADMIN — SODIUM CHLORIDE: 9 INJECTION, SOLUTION INTRAVENOUS at 10:00:00

## 2018-12-07 NOTE — PROGRESS NOTES
Pt ambulated in [place], rt groin remains c/d/i and soft. Discharge instructions reviewed with pt. And spouse. Pt seen by Francine Lesch PA with Dr. Blossom Andre. [address] [facility] office will call him on Monday. Card given. IV d/cd

## 2018-12-07 NOTE — H&P
History & Physical Examination    Patient Name: Palomo Oquendo  MRN: [id number]  [facility]: [id number]  YOB: 1955    Diagnosis: abdominal aneurysm, chest pain, HTN    Present Illness: Mr Isreal Kaye presents to the hospital for a scheduled coronary cat Release Take 1 capsule by mouth daily.  Disp:  Rfl: 0 Taking       Current Facility-Administered Medications:  0.9%  NaCl infusion  Intravenous Continuous       Allergies: No Known Allergies    Past Medical History:   Diagnosis Date   • High blood pressure

## 2018-12-07 NOTE — HISTORICAL OFFICE NOTE
Sybil Chung  : 1955  ACCOUNT:  [id number]  [phone]  PCP: Dr. Sonido Teran     TODAY'S DATE: 10/16/2018  DICTATED BY:  [Dr. Shyla Mittal: [high blood pressure.]    HPI:    [On 10/16/2018, Michelet Hou, a 59year old ma CAROTIDS: carotid pulses normal. ABD AORTA: abdominal aorta not enlarged. FEM: femoral pulses intact. PEDAL: pedal pulses intact. EXT: no peripheral edema. DECISION MAKING:    [] Patient with asymptomatic abdominal aneurysm 3.97.   Will we will repeat a

## 2018-12-11 ENCOUNTER — LAB ENCOUNTER (OUTPATIENT)
Dept: LAB | Facility: HOSPITAL | Age: 63
End: 2018-12-11
Attending: THORACIC SURGERY (CARDIOTHORACIC VASCULAR SURGERY)
Payer: COMMERCIAL

## 2018-12-11 ENCOUNTER — HOSPITAL ENCOUNTER (OUTPATIENT)
Dept: ULTRASOUND IMAGING | Facility: HOSPITAL | Age: 63
Discharge: HOME | End: 2018-12-11
Attending: THORACIC SURGERY (CARDIOTHORACIC VASCULAR SURGERY)
Payer: COMMERCIAL

## 2018-12-11 DIAGNOSIS — Z01.818 PRE-OP EVALUATION: ICD-10-CM

## 2018-12-11 DIAGNOSIS — R09.89 BRUIT: ICD-10-CM

## 2018-12-11 PROCEDURE — 85610 PROTHROMBIN TIME: CPT

## 2018-12-11 PROCEDURE — 83036 HEMOGLOBIN GLYCOSYLATED A1C: CPT

## 2018-12-11 PROCEDURE — 36415 COLL VENOUS BLD VENIPUNCTURE: CPT

## 2018-12-11 PROCEDURE — 85730 THROMBOPLASTIN TIME PARTIAL: CPT

## 2018-12-11 PROCEDURE — 81003 URINALYSIS AUTO W/O SCOPE: CPT

## 2018-12-11 PROCEDURE — 86901 BLOOD TYPING SEROLOGIC RH(D): CPT

## 2018-12-11 PROCEDURE — 80053 COMPREHEN METABOLIC PANEL: CPT

## 2018-12-11 PROCEDURE — 86850 RBC ANTIBODY SCREEN: CPT

## 2018-12-11 PROCEDURE — 85025 COMPLETE CBC W/AUTO DIFF WBC: CPT

## 2018-12-11 PROCEDURE — 86900 BLOOD TYPING SEROLOGIC ABO: CPT

## 2018-12-11 PROCEDURE — 93880 EXTRACRANIAL BILAT STUDY: CPT | Performed by: THORACIC SURGERY (CARDIOTHORACIC VASCULAR SURGERY)

## 2018-12-12 RX ORDER — MULTIVIT WITH MINERALS/LUTEIN
1000 TABLET ORAL DAILY
COMMUNITY
End: 2019-01-24

## 2018-12-14 ENCOUNTER — ANESTHESIA EVENT (OUTPATIENT)
Dept: CARDIAC SURGERY | Facility: HOSPITAL | Age: 63
DRG: 236 | End: 2018-12-14
Payer: COMMERCIAL

## 2018-12-17 ENCOUNTER — ANESTHESIA (OUTPATIENT)
Dept: CARDIAC SURGERY | Facility: HOSPITAL | Age: 63
DRG: 236 | End: 2018-12-17
Payer: COMMERCIAL

## 2018-12-17 ENCOUNTER — APPOINTMENT (OUTPATIENT)
Dept: GENERAL RADIOLOGY | Facility: HOSPITAL | Age: 63
DRG: 236 | End: 2018-12-17
Attending: THORACIC SURGERY (CARDIOTHORACIC VASCULAR SURGERY)
Payer: COMMERCIAL

## 2018-12-17 ENCOUNTER — HOSPITAL ENCOUNTER (INPATIENT)
Facility: HOSPITAL | Age: 63
LOS: 4 days | Discharge: HOME HEALTH | DRG: 236 | End: 2018-12-21
Attending: THORACIC SURGERY (CARDIOTHORACIC VASCULAR SURGERY) | Admitting: THORACIC SURGERY (CARDIOTHORACIC VASCULAR SURGERY)
Payer: COMMERCIAL

## 2018-12-17 DIAGNOSIS — I25.10 CAD (CORONARY ARTERY DISEASE): Primary | ICD-10-CM

## 2018-12-17 DIAGNOSIS — I10 ESSENTIAL HYPERTENSION: ICD-10-CM

## 2018-12-17 DIAGNOSIS — Z01.818 PREOP TESTING: ICD-10-CM

## 2018-12-17 PROCEDURE — 99251 INITIAL INPATIENT CONSULT,LEVL I: CPT | Performed by: HOSPITALIST

## 2018-12-17 PROCEDURE — 06BQ4ZZ EXCISION OF LEFT SAPHENOUS VEIN, PERCUTANEOUS ENDOSCOPIC APPROACH: ICD-10-PCS | Performed by: THORACIC SURGERY (CARDIOTHORACIC VASCULAR SURGERY)

## 2018-12-17 PROCEDURE — 02100Z9 BYPASS CORONARY ARTERY, ONE ARTERY FROM LEFT INTERNAL MAMMARY, OPEN APPROACH: ICD-10-PCS | Performed by: THORACIC SURGERY (CARDIOTHORACIC VASCULAR SURGERY)

## 2018-12-17 PROCEDURE — 06BP4ZZ EXCISION OF RIGHT SAPHENOUS VEIN, PERCUTANEOUS ENDOSCOPIC APPROACH: ICD-10-PCS | Performed by: THORACIC SURGERY (CARDIOTHORACIC VASCULAR SURGERY)

## 2018-12-17 PROCEDURE — 5A1221Z PERFORMANCE OF CARDIAC OUTPUT, CONTINUOUS: ICD-10-PCS | Performed by: THORACIC SURGERY (CARDIOTHORACIC VASCULAR SURGERY)

## 2018-12-17 PROCEDURE — 71045 X-RAY EXAM CHEST 1 VIEW: CPT | Performed by: THORACIC SURGERY (CARDIOTHORACIC VASCULAR SURGERY)

## 2018-12-17 PROCEDURE — 021109W BYPASS CORONARY ARTERY, TWO ARTERIES FROM AORTA WITH AUTOLOGOUS VENOUS TISSUE, OPEN APPROACH: ICD-10-PCS | Performed by: THORACIC SURGERY (CARDIOTHORACIC VASCULAR SURGERY)

## 2018-12-17 RX ORDER — DOCUSATE SODIUM 100 MG/1
100 CAPSULE, LIQUID FILLED ORAL 2 TIMES DAILY
Status: DISCONTINUED | OUTPATIENT
Start: 2018-12-17 | End: 2018-12-21

## 2018-12-17 RX ORDER — MORPHINE SULFATE 4 MG/ML
4 INJECTION, SOLUTION INTRAMUSCULAR; INTRAVENOUS
Status: DISCONTINUED | OUTPATIENT
Start: 2018-12-17 | End: 2018-12-21

## 2018-12-17 RX ORDER — MAGNESIUM SULFATE 1 G/100ML
1 INJECTION INTRAVENOUS AS NEEDED
Status: DISCONTINUED | OUTPATIENT
Start: 2018-12-17 | End: 2018-12-20 | Stop reason: HOSPADM

## 2018-12-17 RX ORDER — NITROGLYCERIN 20 MG/100ML
INJECTION INTRAVENOUS CONTINUOUS PRN
Status: DISCONTINUED | OUTPATIENT
Start: 2018-12-17 | End: 2018-12-21

## 2018-12-17 RX ORDER — VANCOMYCIN HYDROCHLORIDE 1 G/20ML
INJECTION, POWDER, LYOPHILIZED, FOR SOLUTION INTRAVENOUS
Status: DISCONTINUED | OUTPATIENT
Start: 2018-12-17 | End: 2018-12-17 | Stop reason: HOSPADM

## 2018-12-17 RX ORDER — TEMAZEPAM 15 MG/1
15 CAPSULE ORAL NIGHTLY PRN
Status: DISCONTINUED | OUTPATIENT
Start: 2018-12-17 | End: 2018-12-21

## 2018-12-17 RX ORDER — ALBUMIN, HUMAN INJ 5% 5 %
250 SOLUTION INTRAVENOUS ONCE AS NEEDED
Status: COMPLETED | OUTPATIENT
Start: 2018-12-17 | End: 2018-12-17

## 2018-12-17 RX ORDER — MAGNESIUM SULFATE HEPTAHYDRATE 40 MG/ML
2 INJECTION, SOLUTION INTRAVENOUS AS NEEDED
Status: DISCONTINUED | OUTPATIENT
Start: 2018-12-17 | End: 2018-12-20 | Stop reason: HOSPADM

## 2018-12-17 RX ORDER — MIDAZOLAM HYDROCHLORIDE 1 MG/ML
1 INJECTION INTRAMUSCULAR; INTRAVENOUS EVERY 30 MIN PRN
Status: DISCONTINUED | OUTPATIENT
Start: 2018-12-17 | End: 2018-12-18

## 2018-12-17 RX ORDER — DEXTROSE MONOHYDRATE 25 G/50ML
50 INJECTION, SOLUTION INTRAVENOUS
Status: DISCONTINUED | OUTPATIENT
Start: 2018-12-17 | End: 2018-12-18

## 2018-12-17 RX ORDER — HYDROCODONE BITARTRATE AND ACETAMINOPHEN 10; 325 MG/1; MG/1
2 TABLET ORAL EVERY 4 HOURS PRN
Status: DISCONTINUED | OUTPATIENT
Start: 2018-12-17 | End: 2018-12-20

## 2018-12-17 RX ORDER — DEXTROSE AND SODIUM CHLORIDE 5; .45 G/100ML; G/100ML
INJECTION, SOLUTION INTRAVENOUS CONTINUOUS
Status: ACTIVE | OUTPATIENT
Start: 2018-12-17 | End: 2018-12-18

## 2018-12-17 RX ORDER — DEXMEDETOMIDINE HYDROCHLORIDE 4 UG/ML
INJECTION, SOLUTION INTRAVENOUS CONTINUOUS
Status: DISCONTINUED | OUTPATIENT
Start: 2018-12-17 | End: 2018-12-18

## 2018-12-17 RX ORDER — ASPIRIN 325 MG
325 TABLET ORAL ONCE
Status: COMPLETED | OUTPATIENT
Start: 2018-12-17 | End: 2018-12-17

## 2018-12-17 RX ORDER — ASPIRIN 300 MG
300 SUPPOSITORY, RECTAL RECTAL DAILY
Status: DISCONTINUED | OUTPATIENT
Start: 2018-12-18 | End: 2018-12-19

## 2018-12-17 RX ORDER — DOBUTAMINE HYDROCHLORIDE 200 MG/100ML
INJECTION INTRAVENOUS CONTINUOUS PRN
Status: DISCONTINUED | OUTPATIENT
Start: 2018-12-17 | End: 2018-12-21

## 2018-12-17 RX ORDER — ATORVASTATIN CALCIUM 40 MG/1
40 TABLET, FILM COATED ORAL NIGHTLY
Status: DISCONTINUED | OUTPATIENT
Start: 2018-12-17 | End: 2018-12-21

## 2018-12-17 RX ORDER — SODIUM CHLORIDE 9 MG/ML
INJECTION, SOLUTION INTRAVENOUS CONTINUOUS
Status: DISCONTINUED | OUTPATIENT
Start: 2018-12-17 | End: 2018-12-19

## 2018-12-17 RX ORDER — DEXTROSE AND SODIUM CHLORIDE 5; .45 G/100ML; G/100ML
INJECTION, SOLUTION INTRAVENOUS CONTINUOUS
Status: ACTIVE | OUTPATIENT
Start: 2018-12-17 | End: 2018-12-17

## 2018-12-17 RX ORDER — CHLORHEXIDINE GLUCONATE 0.12 MG/ML
15 RINSE ORAL
Status: DISCONTINUED | OUTPATIENT
Start: 2018-12-17 | End: 2018-12-18

## 2018-12-17 RX ORDER — HYDROCODONE BITARTRATE AND ACETAMINOPHEN 10; 325 MG/1; MG/1
1 TABLET ORAL EVERY 4 HOURS PRN
Status: DISCONTINUED | OUTPATIENT
Start: 2018-12-17 | End: 2018-12-20

## 2018-12-17 RX ORDER — POLYETHYLENE GLYCOL 3350 17 G/17G
1 POWDER, FOR SOLUTION ORAL DAILY PRN
Status: DISCONTINUED | OUTPATIENT
Start: 2018-12-17 | End: 2018-12-21

## 2018-12-17 RX ORDER — ONDANSETRON 2 MG/ML
4 INJECTION INTRAMUSCULAR; INTRAVENOUS EVERY 6 HOURS PRN
Status: DISCONTINUED | OUTPATIENT
Start: 2018-12-17 | End: 2018-12-21

## 2018-12-17 RX ORDER — FAMOTIDINE 10 MG/ML
20 INJECTION, SOLUTION INTRAVENOUS 2 TIMES DAILY
Status: DISCONTINUED | OUTPATIENT
Start: 2018-12-17 | End: 2018-12-19

## 2018-12-17 RX ORDER — CEFAZOLIN SODIUM 1 G/3ML
INJECTION, POWDER, FOR SOLUTION INTRAMUSCULAR; INTRAVENOUS
Status: DISCONTINUED | OUTPATIENT
Start: 2018-12-17 | End: 2018-12-17 | Stop reason: HOSPADM

## 2018-12-17 RX ORDER — ALPRAZOLAM 0.25 MG/1
0.25 TABLET ORAL EVERY 6 HOURS PRN
Status: DISCONTINUED | OUTPATIENT
Start: 2018-12-17 | End: 2018-12-21

## 2018-12-17 RX ORDER — ASPIRIN 325 MG
325 TABLET, DELAYED RELEASE (ENTERIC COATED) ORAL DAILY
Status: DISCONTINUED | OUTPATIENT
Start: 2018-12-18 | End: 2018-12-21

## 2018-12-17 RX ORDER — POTASSIUM CHLORIDE 29.8 MG/ML
40 INJECTION INTRAVENOUS AS NEEDED
Status: DISCONTINUED | OUTPATIENT
Start: 2018-12-17 | End: 2018-12-20 | Stop reason: HOSPADM

## 2018-12-17 RX ORDER — MORPHINE SULFATE 4 MG/ML
8 INJECTION, SOLUTION INTRAMUSCULAR; INTRAVENOUS
Status: DISCONTINUED | OUTPATIENT
Start: 2018-12-17 | End: 2018-12-21

## 2018-12-17 RX ORDER — ASPIRIN 300 MG
300 SUPPOSITORY, RECTAL RECTAL ONCE
Status: COMPLETED | OUTPATIENT
Start: 2018-12-17 | End: 2018-12-17

## 2018-12-17 RX ORDER — CEFAZOLIN SODIUM/WATER 2 G/20 ML
2 SYRINGE (ML) INTRAVENOUS EVERY 8 HOURS
Status: COMPLETED | OUTPATIENT
Start: 2018-12-17 | End: 2018-12-19

## 2018-12-17 RX ORDER — ATORVASTATIN CALCIUM 10 MG/1
10 TABLET, FILM COATED ORAL NIGHTLY
Status: DISCONTINUED | OUTPATIENT
Start: 2018-12-17 | End: 2018-12-17

## 2018-12-17 RX ORDER — POTASSIUM CHLORIDE 14.9 MG/ML
20 INJECTION INTRAVENOUS AS NEEDED
Status: DISCONTINUED | OUTPATIENT
Start: 2018-12-17 | End: 2018-12-20 | Stop reason: HOSPADM

## 2018-12-17 RX ORDER — FAMOTIDINE 20 MG/1
20 TABLET ORAL 2 TIMES DAILY
Status: DISCONTINUED | OUTPATIENT
Start: 2018-12-17 | End: 2018-12-21

## 2018-12-17 RX ORDER — BISACODYL 10 MG
10 SUPPOSITORY, RECTAL RECTAL
Status: DISCONTINUED | OUTPATIENT
Start: 2018-12-17 | End: 2018-12-21

## 2018-12-17 RX ORDER — MORPHINE SULFATE 4 MG/ML
2 INJECTION, SOLUTION INTRAMUSCULAR; INTRAVENOUS
Status: DISCONTINUED | OUTPATIENT
Start: 2018-12-17 | End: 2018-12-21

## 2018-12-17 NOTE — ANESTHESIA PREPROCEDURE EVALUATION
PRE-OP EVALUATION    Patient Name: Ana Rosa Wilson    Pre-op Diagnosis: coronary artery disease    Procedure(s):  Coronary artery bypass graft x3. Using endovein and LIMA. Intraoperative ZAINAB.     Surgeon(s) and Role:     * Celestino Hood MD - Primary     * G PROPIONATE 50 MCG/ACT Nasal Suspension SHAKE LIQUID AND USE 2 SPRAYS IN EACH NOSTRIL DAILY Disp: 3 Bottle Rfl: 1   Omega-3 Fatty Acids (FISH OIL) 1000 MG Oral Capsule Delayed Release Take 1 capsule by mouth daily.  Disp:  Rfl: 0       Allergies: Patient has II  Mouth opening: 3 FB  TM distance: 4 - 6 cm  Neck ROM: full Cardiovascular      Rhythm: regular  Rate: normal     Dental    No notable dental history.          Pulmonary    Pulmonary exam normal.                 Other findings            ASA: 3   Plan: g

## 2018-12-17 NOTE — ANESTHESIA POSTPROCEDURE EVALUATION
[facility] 206 Patient Status:  Inpatient   Age/Gender 59year old male MRN [id number]   [facility] 6NE-A Attending Dianna Turner MD   Hosp Day # 0 PCP Vanessa Flynn MD       Anesthesia Post-op Note    Procedure(s):  C

## 2018-12-17 NOTE — CONSULTS
[facility]  Report of Consultation    Emilie Bustillo Patient Status:  Inpatient    1955 MRN [id number]   [facility] 6NE-A Attending Jocelyne Edmond MD   Hosp Day # 0 PCP Mayo Malave MD     Reason for Consultation:  Stress hy Human (NOVOLIN R) 100 Units in sodium chloride 0.9 % 100 mL infusion, 1-57 Units/hr, Intravenous, Continuous  •  glucose (DEX4) oral liquid 15 g, 15 g, Oral, Q15 Min PRN **OR** Glucose-Vitamin C (DEX-4) 4-6 GM-MG chewable tab 4 tablet, 4 tablet, Oral, Q15 aspirin 300 MG rectal suppository 300 mg, 300 mg, Rectal, Daily  •  metoprolol Tartrate (LOPRESSOR) tab 25 mg, 25 mg, Oral, 2x Daily(Beta Blocker)  •  docusate sodium (COLACE) cap 100 mg, 100 mg, Oral, BID **OR** docusate sodium (COLACE) liquid 100 mg, 100 Last 6 Encounters:  12/17/18 : 210 lb 8 oz (95.5 kg)  12/04/18 : 220 lb (99.8 kg)  05/31/18 : 220 lb (99.8 kg)  02/01/18 : 220 lb (99.8 kg)  12/21/17 : 218 lb (98.9 kg)  05/18/17 : 218 lb (98.9 kg)    Constitutional /77   Pulse 94   Temp 98.3 °F (36. accordingly. 2) cad s/p cabg 12/17/18  3) hyperlipidemia   -management per cardiac services  -asa, statin, bblocker. Plan of care discussed with patient/family at bedside. All questions/concerns addressed as fully as possible.       I will continue

## 2018-12-17 NOTE — CONSULTS
Kate Luong 12 Patient Status:  Inpatient    1955 MRN [id number]   [facility] 6NE-A Attending David Zavala MD   Hosp Day # 0 PCP Alda Stokes MD     Reason for consult: med mgt    Requested by: Rfl:    METOPROLOL SUCCINATE ER 50 MG Oral Tablet 24 Hr TAKE 2 TABLETS(100 MG) BY MOUTH DAILY (Patient taking differently: TAKE 1TABLETS(50 MG) BY MOUTH BID) Disp: 180 tablet Rfl: 0   ATORVASTATIN 40 MG Oral Tab TAKE 1 TABLET BY MOUTH EVERY DAY Disp: 90 t CREATSERUM  1.15  1.09   GFRAA  78  84   GFRNAA  68  72   CA  8.9  8.6   ALB  3.5   --    NA  139  141   K  3.9  3.9   CL  105  109   CO2  28.0  23.0   ALKPHO  55   --    AST  19   --    ALT  29   --    BILT  1.1   --    TP  6.9   --        Recent Labs

## 2018-12-17 NOTE — PROGRESS NOTES
[facility]  Progress Note    Meghanjessica Varner Patient Status:  Inpatient    1955 MRN [id number]   [facility] 6NE-A Attending Latrice Mathews MD   Hosp Day # 0 PCP Mabel Byrne MD       Assessment:    · CAD, POD #0 CABG; preserv Intravenous BID   • vancomycin  15 mg/kg (Adjusted) Intravenous Q12H   • mupirocin  1 Application Nasal BID   • ceFAZolin  2 g Intravenous Q8H     • insulin regular 3 Units/hr (12/17/18 1232)   • Dextrose-NaCl 80 mL/hr at 12/17/18 1233    Followed by   • D

## 2018-12-17 NOTE — HISTORICAL OFFICE NOTE
Stephane Wells  : 1955  ACCOUNT:  [id number]  [phone]  PCP: Dr. Isela Guevara     TODAY'S DATE: 10/16/2018  DICTATED BY:  [Dr. Nuñez Smart: [high blood pressure.]    HPI:    [On 10/16/2018, German Lutz, a 59year old ma CAROTIDS: carotid pulses normal. ABD AORTA: abdominal aorta not enlarged. FEM: femoral pulses intact. PEDAL: pedal pulses intact. EXT: no peripheral edema. DECISION MAKING:    [] Patient with asymptomatic abdominal aneurysm 3.97.   Will we will repeat a

## 2018-12-17 NOTE — PLAN OF CARE
Assumed care of patient at 1200 from [facility] 23. Patient intubated and sedated on vent. Dobutamine and nitroglycerin infusing from OR. Able to wean off nitro at this time. Cantril, cordis, prakash, chest tube, kendrick in place.  Chest tube with minimal sanguinous draina

## 2018-12-18 ENCOUNTER — APPOINTMENT (OUTPATIENT)
Dept: GENERAL RADIOLOGY | Facility: HOSPITAL | Age: 63
DRG: 236 | End: 2018-12-18
Attending: THORACIC SURGERY (CARDIOTHORACIC VASCULAR SURGERY)
Payer: COMMERCIAL

## 2018-12-18 PROCEDURE — 99231 SBSQ HOSP IP/OBS SF/LOW 25: CPT | Performed by: HOSPITALIST

## 2018-12-18 PROCEDURE — 71045 X-RAY EXAM CHEST 1 VIEW: CPT | Performed by: THORACIC SURGERY (CARDIOTHORACIC VASCULAR SURGERY)

## 2018-12-18 RX ORDER — ACETAMINOPHEN 325 MG/1
650 TABLET ORAL EVERY 4 HOURS PRN
Status: DISCONTINUED | OUTPATIENT
Start: 2018-12-18 | End: 2018-12-20

## 2018-12-18 RX ORDER — DEXTROSE MONOHYDRATE 25 G/50ML
50 INJECTION, SOLUTION INTRAVENOUS
Status: DISCONTINUED | OUTPATIENT
Start: 2018-12-18 | End: 2018-12-21

## 2018-12-18 RX ORDER — FUROSEMIDE 10 MG/ML
20 INJECTION INTRAMUSCULAR; INTRAVENOUS
Status: DISCONTINUED | OUTPATIENT
Start: 2018-12-18 | End: 2018-12-20

## 2018-12-18 NOTE — OCCUPATIONAL THERAPY NOTE
OCCUPATIONAL THERAPY EVALUATION - INPATIENT     Room Number: 0167/2014-Z  Evaluation Date: 12/18/2018  Type of Evaluation: Initial  Presenting Problem: CABG 12/17    Physician Order: IP Consult to Occupational Therapy  Reason for Therapy: ADL/IADL Dysfunct Vision: no visual deficits    PERCEPTION  Overall Perception Status:   WFL - within functional limits    SENSATION  Light touch:  intact    Communication: clear speech    Behavioral/Emotional/Social: motivated    RANGE OF MOTION AND STRENGTH ASSESSMENT  Up Educated them about plan of care. Both verbalized understanding. Patient End of Session: Up in chair;Needs met;Call light within reach;RN aware of session/findings; All patient questions and concerns addressed;SCDs in place; Family present    ASSESSMENT Jack Mederos  Frequency (Obs): 5x/week  Number of Visits to Meet Established Goals: 5    ADL Goals   Patient will perform grooming: with modified independent and while standing at sink  Patient will perform upper body dressing:  with modified independent  Kam

## 2018-12-18 NOTE — DIETARY NOTE
Nutrition Short Note    Dietitian consult received per cardiac rehab protocol. Pt to be educated by cardiac rehab staff and encouraged to attend outpatient classes taught by RD. RD available PRN.     Bobbi Gregg, BELLO, LDN

## 2018-12-18 NOTE — PROGRESS NOTES
[facility]  Progress Note    [facility] Patient Status:  Inpatient    1955 MRN [id number]   [facility] 6NE-A Attending [place], MD   Hosp Day # 1 PCP Soto Kohli MD       SUBJECTIVE:  Extubated overnight, tolerati 0659   PGLU  155*  174*  192*  174*  177*  159*  148*  133*  123*  118*  132*  123*  112*  125*  107*  128*  116*  121*  113*  128*       Meds:     Current Facility-Administered Medications:  acetaminophen (TYLENOL) tab 650 mg 650 mg Oral Q4H PRN   Insulin Daily(Beta Blocker)   docusate sodium (COLACE) cap 100 mg 100 mg Oral BID   Or      docusate sodium (COLACE) liquid 100 mg 100 mg Oral BID   magnesium hydroxide (MILK OF MAGNESIA) 400 MG/5ML suspension 10 mL 10 mL Oral BID PRN   PEG 3350 (MIRALAX) powder p MD  Endocrinology, Diabetes, and Metabolism  [facility]  12/18/2018  7:57 AM

## 2018-12-18 NOTE — PROGRESS NOTES
12/18/18 1516   Clinical Encounter Type   Visited With Patient and family together   Patient's Supportive Strategies/Resources Father Chayo Orantes provided prayer, Scripture, support and Sacrament of the Sick.     Sacramental Encounters   Sacrament of Sick-Anoint

## 2018-12-18 NOTE — HOME CARE LIAISON
Received referral from [place], [address]. Met with pt at the bedside. Pt is agreeable to [facility] services at d/c. Brochure and liaison card provided. Any pt questions addressed. Will follow.

## 2018-12-18 NOTE — PROGRESS NOTES
2000- received pt alert and orientated. Vss. Insulin and dobs infusing as ordered, will titrate accordingly. Chest intact and draining. Pt comfortable. No c/o, no apparent distress noted.

## 2018-12-18 NOTE — PLAN OF CARE
Pt received at 0000 alert/oriented x4, FRIAS, following all commands. Pt denies any SOB, does c/o mid chest/sternal pain r/t surgery and chest tubes. States pain is worse when inhaling. Norco dose given for pain relief.  Midsternal incision site CDI, dressing

## 2018-12-18 NOTE — PROGRESS NOTES
[facility]  Progress note     Paolo Vail Patient Status:  Inpatient    1955 MRN [id number]   [facility] 6NE-A Attending Adam Garza MD   Hosp Day # 1 PCP Brittny Ernandez MD     Reason for consult: med mgt    Request full  · Lang: yes    Plan of care discussed with patient and rn    Nathalie Simental MD

## 2018-12-18 NOTE — RESPIRATORY THERAPY NOTE
SHIV : EQUIPMENT USE: DAILY SUMMARY                                            SET MODE: AUTO CPAP WITH CFLEX                                          USAGE IN HOURS: 1:06                                          90

## 2018-12-18 NOTE — PROGRESS NOTES
[facility]  Progress Note    [facility] Patient Status:  Inpatient    1955 MRN [id number]   [facility] 6NE-A Attending Maribel Melendez MD   [address] Day # 1 PCP Yovani Tse MD       Assessment:    · CAD s/p CABG 18 - o mg Oral 2x Daily(Beta Blocker)   • docusate sodium  100 mg Oral BID    Or   • docusate sodium  100 mg Oral BID   • famoTIDine  20 mg Oral BID    Or   • famoTIDine  20 mg Intravenous BID   • mupirocin  1 Application Nasal BID   • ceFAZolin  2 g Intravenous
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
## 2018-12-18 NOTE — PHYSICAL THERAPY NOTE
PHYSICAL THERAPY EVALUATION - INPATIENT     Room Number: 6159/0974-Z  Evaluation Date: 12/18/2018  Type of Evaluation: Initial  Physician Order: PT Eval and Treat    Presenting Problem: CABG X 3 12/17  Reason for Therapy: Mobility Dysfunction and Dis functional limits  · Orientation Level:  oriented x4  · Following Commands:  follows multistep commands without difficulty  · Safety Judgement:  good awareness of safety precautions    RANGE OF MOTION AND STRENGTH ASSESSMENT  See OT evaluation    Lower ext FIM    Skilled Therapy Provided: Patient received in supine with wife present. Patient educated in sternal precautions and log roll technique for bed mobility. Transfers from supine to EOB with min a for trunk.   Patient with complaints of dizziness in si training;Transfer training;Balance training  Rehab Potential : Good  Frequency (Obs): Daily  Number of Visits to Meet Established Goals: 3      CURRENT GOALS    Goal #1 Patient is able to demonstrate supine - sit EOB @ level: modified independent with log

## 2018-12-18 NOTE — PROGRESS NOTES
[facility]  CV Surgery Progress Note    Remy Spivey Patient Status:  Inpatient    1955 MRN [id number]   [facility] 6NE-A Attending Koby Young MD   Hosp Day # 1 PCP Enrique Morrow MD     Subjective:  [facility]  12/18/2018  9:18 AM

## 2018-12-18 NOTE — H&P
Abhinav Gonzalez M.D.  [address] [facility]:    [address]     : 1955  [place], [address] [facility]     Dear Doctor Stone Michel:     I had the opportunity to see your patient, Mr. Marcial Bowman, today regarding coronary bypass surgery. On review of systems, the patient has no complaints of blurred vision or hearing problems. The patient denies palpitations, wheezing, syncope, or dizziness. There are no symptoms of GI reflux, urgency or frequency of urination, or rashes.   The patient ha

## 2018-12-18 NOTE — OPERATIVE REPORT
[facility]    PATIENT'S NAME: Rober Maddison   ATTENDING PHYSICIAN: Kathleen Baron MD   OPERATING PHYSICIAN: Kathleen Baron MD   PATIENT ACCOUNT#:   [id number]    LOCATION:  [address] [place] [address]  MEDICAL RECORD #:   [id number]       YOB: 1955  A plaqued artery. Cardioplegia was given,  following which the left posterior descending was identified and opened. This was a 1 to 1.5 mm artery. A segment of saphenous graft was placed here.   Cardioplegia was given, following which the left internal gigi

## 2018-12-19 LAB
BUN: 15 MG/DL
CALCIUM: 9.1 MG/DL
CHLORIDE: 105 MEQ/L
CREATININE, SERUM: 1.09 MG/DL
GLUCOSE: 118 MG/DL
HEMATOCRIT: 50.7 %
HEMOGLOBIN: 16.6 G/DL
PLATELETS: 190 K/UL
POTASSIUM, SERUM: 4.7 MEQ/L
RED BLOOD COUNT: 5.61 X 10-6/U
SODIUM: 141 MEQ/L
WHITE BLOOD COUNT: 9.8 X 10-3/U

## 2018-12-19 PROCEDURE — 99232 SBSQ HOSP IP/OBS MODERATE 35: CPT | Performed by: HOSPITALIST

## 2018-12-19 RX ORDER — METOPROLOL TARTRATE 50 MG/1
50 TABLET, FILM COATED ORAL
Status: DISCONTINUED | OUTPATIENT
Start: 2018-12-19 | End: 2018-12-21

## 2018-12-19 NOTE — PHYSICAL THERAPY NOTE
PHYSICAL THERAPY TREATMENT NOTE - INPATIENT    Room Number: 5577/7643-M     Session: 1   Number of Visits to Meet Established Goals: 3     History related to current admission: admitted from home after workup revealed multivessel disease s/p CABG X 3 12/1 - BASIC MOBILITY  How much difficulty does the patient currently have. ..  -   Turning over in bed (including adjusting bedclothes, sheets and blankets)?: A Little   -   Sitting down on and standing up from a chair with arms (e.g., wheelchair, bedside commo generating capacity and awareness of precautions and energy conservation. Patient will benefit from continued skilled IP PT services to progress with mobility skills.       DISCHARGE RECOMMENDATIONS  PT Discharge Recommendations: Home     PLAN  PT Treatmen

## 2018-12-19 NOTE — RESPIRATORY THERAPY NOTE
SHIV - Equipment Use Daily Summary:                  . Set Mode: AUTO CPAP WITH C-FLEX                . Usage in hours: 6:48                . 90% Pressure (EPAP) level: 11.1                . 90% Insp. Pressure (IPAP): Slater August  AHI: 3.9

## 2018-12-19 NOTE — PROGRESS NOTES
[facility]  CV Surgery Progress Note    Palomo Oquendo Patient Status:  Inpatient    1955 MRN [id number]   [facility] 6NE-A Attending Regi Field MD   Hosp Day # 2 PCP Inderjit Cohn MD     Subjective:  Patient seen and exa

## 2018-12-19 NOTE — PROGRESS NOTES
[facility]  Progress note     Kierstne Mcdonnell Patient Status:  Inpatient    1955 MRN [id number]   [facility] 6NE-A Attending Tom Martinez MD   Hosp Day # 2 PCP Cornelia Story MD     Reason for consult: med mgt    Request

## 2018-12-19 NOTE — PROGRESS NOTES
[facility]  Progress Note    Edson Fan Patient Status:  Inpatient    1955 MRN [id number]   [facility] 6[facility]-A Attending Ena Pearson MD   Hosp Day # 2 PCP Megan Morales MD       SUBJECTIVE:  No acute events overnight.   Tania Casey 132*  123*  112*  125*  107*  128*  116*  121*  113*  128*  108*  123*  147*  136*  146*  108*  107*       Meds:     Current Facility-Administered Medications:  Insulin Aspart Pen (NOVOLOG) 100 UNIT/ML flexpen 0-60 Units 0-60 Units Subcutaneous TID CC and (MILK OF MAGNESIA) 400 MG/5ML suspension 10 mL 10 mL Oral BID PRN   PEG 3350 (MIRALAX) powder packet 17 g 1 packet Oral Daily PRN   bisacodyl (DULCOLAX) rectal suppository 10 mg 10 mg Rectal Daily PRN   ondansetron HCl (ZOFRAN) injection 4 mg 4 mg Intraven

## 2018-12-19 NOTE — PLAN OF CARE
Pt received alert/oriented x4, FRIAS, following all commands. Pt denies any SOB. Does c/o midsternal pain. Norco given for pain relief as needed. Lungs clear, diminished. Midsternal incision GREY, scant amt old drainage noted on steri strips.  Right and left l

## 2018-12-19 NOTE — PROGRESS NOTES
[facility]  Progress Note    Boy Counter Patient Status:  Inpatient    1955 MRN [id number]   [facility] 6NE-A Attending Monty Menard MD   Hosp Day # 2 PCP Mahendra Chung MD       Subjective:  Overall doing ok.  Did not sle mupirocin  1 Application Nasal BID   • atorvastatin  40 mg Oral Nightly     • sodium chloride 30 mL/hr at 12/18/18 0400   • DOBUTamine Stopped (12/18/18 0100)   • Nitroglycerin in D5W Stopped (12/17/18 1415)   • norepinephrine     • nitroprusside (NIPRIDE)

## 2018-12-20 PROCEDURE — 99232 SBSQ HOSP IP/OBS MODERATE 35: CPT | Performed by: HOSPITALIST

## 2018-12-20 RX ORDER — ACETAMINOPHEN 10 MG/ML
1000 INJECTION, SOLUTION INTRAVENOUS EVERY 6 HOURS
Status: DISCONTINUED | OUTPATIENT
Start: 2018-12-20 | End: 2018-12-21

## 2018-12-20 RX ORDER — SCOLOPAMINE TRANSDERMAL SYSTEM 1 MG/1
1 PATCH, EXTENDED RELEASE TRANSDERMAL
Status: DISCONTINUED | OUTPATIENT
Start: 2018-12-20 | End: 2018-12-21

## 2018-12-20 RX ORDER — KETOROLAC TROMETHAMINE 15 MG/ML
15 INJECTION, SOLUTION INTRAMUSCULAR; INTRAVENOUS EVERY 6 HOURS PRN
Status: DISCONTINUED | OUTPATIENT
Start: 2018-12-20 | End: 2018-12-21

## 2018-12-20 NOTE — PAYOR COMM NOTE
--------------  ADMISSION REVIEW     Payor: [address] [place] [address] Road #:  [id number]  Authorization Number: [id number]    Admit date: 12/17/18  Admit time: 7905       Admitting Physician: Nahun Hernandez MD  Attending Physician:   Mauro Left ventricular function is preserved. There is no valvular pathology. The rhythm is sinus. Were bypass surgery to occur, we would bypass the LAD, diagonal, and left posterior descending coronary arteries.       Past history is negative for myocardial i We have, today, discussed the nature of the patient’s cardiovascular condition that being multivessel coronary artery disease with objective evidence of reversible ischemia in a patient who has left main disease by intravascular ultrasound.   We have discus HYDROcodone-acetaminophen (NORCO)  MG per tab 2 tablet     Date Action Dose Route User    12/20/2018 0136 Given 2 tablet Oral Librado Martinez RN    12/19/2018 1744 Given 2 tablet Oral Gwendolyn Reeves, RN      Insulin Aspart Pen (NOVOLOG) 100 UNIT/ML 59year old male who presented with new AAA which was followed by stress testing. Stress test was abnormal which promted cardiac cath that showed left main disease. He was admitted for scheduled CABG x3 without complication. His a1c was 5.5% on 12/11/18. •  glucose (DEX4) oral liquid 15 g, 15 g, Oral, Q15 Min PRN **OR** Glucose-Vitamin C (DEX-4) 4-6 GM-MG chewable tab 4 tablet, 4 tablet, Oral, Q15 Min PRN **OR** dextrose 50 % injection 50 mL, 50 mL, Intravenous, Q15 Min PRN **OR** glucose (DEX4) oral liqui •  metoprolol Tartrate (LOPRESSOR) tab 25 mg, 25 mg, Oral, 2x Daily(Beta Blocker)  •  docusate sodium (COLACE) cap 100 mg, 100 mg, Oral, BID **OR** docusate sodium (COLACE) liquid 100 mg, 100 mg, Oral, BID  •  magnesium hydroxide (MILK OF MAGNESIA) 400 MG/ 12/04/18 : 220 lb (99.8 kg)  05/31/18 : 220 lb (99.8 kg)  02/01/18 : 220 lb (99.8 kg)  12/21/17 : 218 lb (98.9 kg)  05/18/17 : 218 lb (98.9 kg)     Constitutional /77   Pulse 94   Temp 98.3 °F (36.8 °C) (Pulmonary Artery)   Resp 12   Ht 71\"   Wt 210 3) hyperlipidemia   -management per cardiac services  -asa, statin, bblocker.      Plan of care discussed with patient/family at bedside.   All questions/concerns addressed as fully as possible.       I will continue to follow closely while the patient is i   Performed by Reynaldo Pandey MD at [facility]         Social History:  reports that  has never smoked.  he has never used smokeless tobacco.  He reports that he does not use drugs.     Family History:         Family History   Problem Relatio FLUTICASONE PROPIONATE 50 MCG/ACT Nasal Suspension SHAKE LIQUID AND USE 2 SPRAYS IN EACH NOSTRIL DAILY Disp: 3 Bottle Rfl: 1   Omega-3 Fatty Acids (FISH OIL) 1000 MG Oral Capsule Delayed Release Take 1 capsule by mouth daily.  Disp:  Rfl: 0         Review o Electronically signed by Vesna Hassan MD at 12/17/2018  2:20 PM       Admission (Current) on 12/17/2018            Detailed Report        Chart Review: Note Routing History     No Routing History on File     Karyna Onofre MD   Physician   Cardiothoracic OPERATIVE TECHNIQUE:  Appropriate lines and catheters were placed. General anesthesia was induced. Lang catheter was placed in the bladder for drainage. The patient was prepped and draped. Sternotomy was performed.   The left internal mammary artery wa d:     12/17/2018 11:38:23  t:      12/17/2018 15:12:34  [facility]   [id number]  /                   Admission (Current) on 12/17/2018            Detailed Report        Chart Review: Note Routing History     No Routing History on File     PLEASE FAX

## 2018-12-20 NOTE — PROGRESS NOTES
[facility]  Progress Note    Emilie Bustillo Patient Status:  Inpatient    1955 MRN [id number]   [facility] 8NE-A Attending Jocelyne Edmond MD   Hosp Day # 3 PCP Mayo Malave MD       Assessment:    · CAD s/p CABG  · PAD with Stopped (12/18/18 0100)   • Nitroglycerin in D5W Stopped (12/17/18 1415)   • norepinephrine     • nitroprusside (NIPRIDE) 50 mg in D5W infusion           Pedro Joshua MD  12/20/2018  7:41 AM

## 2018-12-20 NOTE — PLAN OF CARE
Pt arrived from CCU. POD #2 CABG x 3. Sternal incision wnl. Chest tubes removed yesterday and pressure dsng C/D/I with pacer wires underneath. LLE incision with s/s WNL. Right thigh incision with s/s ecchymotic but wnl and no drainage.  Pain is well managed

## 2018-12-20 NOTE — PROGRESS NOTES
[facility]  Progress note     Remy Spivey Patient Status:  Inpatient    1955 MRN [id number]   [facility] 6NE-A Attending Koby Young MD   Hosp Day # 3 PCP Enrique Morrow MD     Reason for consult: med mgt    Request 4. Leukocytosis  1. Monitor  5. Stress hyperglycemia  1. Per endocrine   6. Constipation  1. Continue bowel regimen     Quality:  · DVT Prophylaxis: SCD  · CODE status: full  · Lang: no    Plan of care discussed with patient and RN.     Jose Wooten DO

## 2018-12-20 NOTE — PHYSICAL THERAPY NOTE
PHYSICAL THERAPY TREATMENT NOTE - INPATIENT    Room Number: 9762/7622-C    Session: 2  Number of Visits to Meet Established Goals: 3     History related to current admission: admitted from home after workup revealed multivessel disease s/p CABG X 3 12/17 - BASIC MOBILITY  How much difficulty does the patient currently have. ..  -   Turning over in bed (including adjusting bedclothes, sheets and blankets)?: None   -   Sitting down on and standing up from a chair with arms (e.g., wheelchair, bedside commode, procedure. Patient End of Session: Up in chair;Needs met;Call light within reach;RN aware of session/findings; All patient questions and concerns addressed    ASSESSMENT     Patient currently does not meet criteria for skilled inpatient physical ther

## 2018-12-20 NOTE — OCCUPATIONAL THERAPY NOTE
OCCUPATIONAL THERAPY TREATMENT NOTE - INPATIENT     Room Number: 6249/4877-D  Session: 1  Number of Visits to Meet Established Goals: 5    Presenting Problem: CABG 12/17    History related to current admission: admitted from home after workup revealed mult care of personal grooming such as brushing teeth?: None  -   Eating meals?: None    AM-PAC Score:  Score: 21  Approx Degree of Impairment: 32.79%  Standardized Score (AM-PAC Scale): 44.27  CMS Modifier (G-Code): CJ    FUNCTIONAL TRANSFER ASSESSMENT  Supine his prior level of function      OT Discharge Recommendations: Home  OT Device Recommendations: None    PLAN  OT Treatment Plan: Balance activities; Energy conservation/work simplification techniques;ADL training;Functional transfer training;UE strengthenin

## 2018-12-20 NOTE — PROGRESS NOTES
[facility]  Progress Note    Remy Spivey Patient Status:  Inpatient    1955 MRN [id number]   [facility] 8NE-A Attending Koby Young MD   Hosp Day # 3 PCP Enrique Morrow MD       SUBJECTIVE:  No acute events overnight.   L 174*  192*  174*  177*  159*  148*  133*  123*  118*  132*  123*  112*  125*  107*  128*  116*  121*  113*  128*  108*  123*  147*  136*  146*  108*  107*  112*  153*  160*  120*       Meds:     Current Facility-Administered Medications:  scopolamine (FRANCIS sodium (COLACE) liquid 100 mg 100 mg Oral BID   magnesium hydroxide (MILK OF MAGNESIA) 400 MG/5ML suspension 10 mL 10 mL Oral BID PRN   PEG 3350 (MIRALAX) powder packet 17 g 1 packet Oral Daily PRN   bisacodyl (DULCOLAX) rectal suppository 10 mg 10 mg Rect

## 2018-12-20 NOTE — RESPIRATORY THERAPY NOTE
SHIV - Equipment Use Daily Summary:                  . Set Mode: AUTO CPAP WITH C-FLEX                . Usage in hours: 6:16                . 90% Pressure (EPAP) level: 11.2                . 90% Insp. Pressure (IPAP): Amy Gamino  AHI: 3.9

## 2018-12-20 NOTE — OCCUPATIONAL THERAPY NOTE
IP OT attempt to see patient for therapeutic assessment/treatment. RN Sabrina Courtney ) states that patient has been awake all night, is currently nauseous and would like to sleep for a while. Will attempt again as able.     RONALDO Becerril, OTR/L  Master of

## 2018-12-20 NOTE — PROGRESS NOTES
[facility]  CV Surgery Progress Note    Any Pryor Patient Status:  Inpatient    1955 MRN [id number]   [facility] 6NE-A Attending Julee Barnes MD   Hosp Day # 3 PCP Broderick Galarza MD     Subjective:  Patient seen this AM BAKARI/Elroy  12/20/2018  9:18 AM

## 2018-12-21 VITALS
RESPIRATION RATE: 17 BRPM | OXYGEN SATURATION: 92 % | BODY MASS INDEX: 29.58 KG/M2 | DIASTOLIC BLOOD PRESSURE: 74 MMHG | HEART RATE: 92 BPM | WEIGHT: 211.31 LBS | HEIGHT: 71 IN | TEMPERATURE: 99 F | SYSTOLIC BLOOD PRESSURE: 132 MMHG

## 2018-12-21 PROCEDURE — 99232 SBSQ HOSP IP/OBS MODERATE 35: CPT | Performed by: HOSPITALIST

## 2018-12-21 RX ORDER — METOPROLOL SUCCINATE 50 MG/1
50 TABLET, EXTENDED RELEASE ORAL 2 TIMES DAILY
Qty: 60 TABLET | Refills: 0 | Status: SHIPPED | OUTPATIENT
Start: 2018-12-21 | End: 2018-12-26

## 2018-12-21 RX ORDER — ACETAMINOPHEN 500 MG
1000 TABLET ORAL EVERY 6 HOURS PRN
Status: DISCONTINUED | OUTPATIENT
Start: 2018-12-21 | End: 2018-12-21

## 2018-12-21 RX ORDER — ACETAMINOPHEN 500 MG
500 TABLET ORAL EVERY 6 HOURS PRN
Status: DISCONTINUED | OUTPATIENT
Start: 2018-12-21 | End: 2018-12-21

## 2018-12-21 RX ORDER — PSEUDOEPHEDRINE HCL 30 MG
100 TABLET ORAL 2 TIMES DAILY
Qty: 30 CAPSULE | Refills: 0 | Status: SHIPPED | OUTPATIENT
Start: 2018-12-21 | End: 2019-01-05

## 2018-12-21 RX ORDER — TRAMADOL HYDROCHLORIDE 50 MG/1
50 TABLET ORAL EVERY 6 HOURS PRN
Qty: 50 TABLET | Refills: 0 | Status: SHIPPED | OUTPATIENT
Start: 2018-12-21 | End: 2019-04-11 | Stop reason: ALTCHOICE

## 2018-12-21 RX ORDER — POTASSIUM CHLORIDE 20 MEQ/1
40 TABLET, EXTENDED RELEASE ORAL ONCE
Status: COMPLETED | OUTPATIENT
Start: 2018-12-21 | End: 2018-12-21

## 2018-12-21 RX ORDER — TRAMADOL HYDROCHLORIDE 50 MG/1
50 TABLET ORAL EVERY 6 HOURS PRN
Status: DISCONTINUED | OUTPATIENT
Start: 2018-12-21 | End: 2018-12-21

## 2018-12-21 NOTE — PROGRESS NOTES
Met with patient and wife to discuss discharge instructions, activity restrictions and recommendations, follow up visits, all questions answered, encouraged to call with any questions or concerns. Discussed pain management.   Per Fidelia James pembertoncri

## 2018-12-21 NOTE — PROGRESS NOTES
[facility]  Endocrinology Progress Note    [facility] Patient Status:  Inpatient    1955 MRN [id number]   [facility] 8NE-A Attending Neo Light MD   [facility] Day # 4 PCP Soto Kohli MD     CC: CAD s/p CABG    Subjective 12/21/2018     12/21/2018    CA 8.4 12/21/2018    PGLU 148 12/21/2018       Recent Labs      12/18/18   1702  12/18/18   2052  12/19/18   0156  12/19/18   0725  12/19/18   1111  12/19/18   1739  12/19/18   2048  12/19/18   2126  12/20/18   0819  12/

## 2018-12-21 NOTE — PROGRESS NOTES
· [facility]      Subjective:  Mild pain, has been up in halls, ready for discharge    Objective:  132/74   99.5  Afebrile    BUN/cr 18/0.88  Hgb 11.5    Cardiac: S1 S2 regular  Lungs: clear  Abdomen: soft  Extremities: no edema - incisions

## 2018-12-21 NOTE — PROGRESS NOTES
[facility]  CV Surgery Progress Note    Erin Jason Patient Status:  Inpatient    1955 MRN [id number]   [facility] 6NE-A Attending Karyna Onofre MD   [facility] Day # 4 PCP Juan Mac MD     Subjective:  Patient seen this AM

## 2018-12-21 NOTE — OCCUPATIONAL THERAPY NOTE
OCCUPATIONAL THERAPY TREATMENT NOTE - INPATIENT     Room Number: 9053/6681-E  Session: 2  Number of Visits to Meet Established Goals: 5    Presenting Problem: CABG 12/17    History related to current admission: admitted from home after workup revealed mult Toileting, which includes using toilet, bedpan or urinal? : None  -   Putting on and taking off regular upper body clothing?: None  -   Taking care of personal grooming such as brushing teeth?: None  -   Eating meals?: None    AM-PAC Score:  Score: 24  A independent     Functional Transfer Goals  Patient will transfer to toilet:  with modified independent     UE Exercise Program Goal  Patient will be independent with bilateral AROM CV- POST-OP HEP (home exercise program).      Additional Goals:  Pt will rec

## 2018-12-21 NOTE — RESPIRATORY THERAPY NOTE
SHIV : EQUIPMENT USE: DAILY SUMMARY                                            SET MODE: AUTO CPAP WITH CFLEX                                          USAGE IN HOURS: 7:39                                          90

## 2018-12-24 ENCOUNTER — PATIENT OUTREACH (OUTPATIENT)
Dept: CASE MANAGEMENT | Age: 63
End: 2018-12-24

## 2018-12-24 DIAGNOSIS — Z02.9 ENCOUNTERS FOR UNSPECIFIED ADMINISTRATIVE PURPOSE: ICD-10-CM

## 2018-12-24 DIAGNOSIS — Z95.1 S/P CABG (CORONARY ARTERY BYPASS GRAFT): ICD-10-CM

## 2018-12-24 DIAGNOSIS — I10 ESSENTIAL HYPERTENSION: ICD-10-CM

## 2018-12-24 PROCEDURE — 1111F DSCHRG MED/CURRENT MED MERGE: CPT

## 2018-12-24 RX ORDER — ACETAMINOPHEN 500 MG
500 TABLET ORAL EVERY 6 HOURS PRN
COMMUNITY
End: 2019-08-28

## 2018-12-24 NOTE — PROGRESS NOTES
Initial Post Discharge Follow Up   Discharge Date: 12/21/18  Contact Date: 12/24/2018    Consent Verification:  Assessment Completed With: Patient  HIPAA Verified?   Yes    Discharge Dx:   CAD s/p CABG X3 by Dr. Judith Santiago:   • How have you been sinc 50 MG Oral Tab Take 1 tablet (50 mg total) by mouth every 6 (six) hours as needed for Pain. Disp: 50 tablet Rfl: 0   Metoprolol Succinate ER 50 MG Oral Tablet 24 Hr Take 1 tablet (50 mg total) by mouth 2 (two) times daily.  Disp: 60 tablet Rfl: 0   Saw Palm Yes   Has HH been set up? Yes   If Yes: With Whom: [facility]   When: Pt stated services have started- RN is to come out today. DME ordered at D/C? Yes   What? Heart pillow and incentive spirometer  Have you received your (DME)?    yes    Services Dress comfortably                                                                                                                                                [facility], [address] Cardiac Surgery

## 2018-12-24 NOTE — TELEPHONE ENCOUNTER
Pt was contacted for TCM. During the call, pt stated he needed a refill on his Pantoprazole 40 mg daily and Metoprolol 50 BID. Pt was provided a script for Metoprolol at d/c per the chart however when asked, pt said he does not recall receiving the script.

## 2018-12-26 DIAGNOSIS — I10 ESSENTIAL HYPERTENSION: ICD-10-CM

## 2018-12-26 RX ORDER — METOPROLOL SUCCINATE 50 MG/1
50 TABLET, EXTENDED RELEASE ORAL 2 TIMES DAILY
Qty: 60 TABLET | Refills: 0 | Status: SHIPPED | OUTPATIENT
Start: 2018-12-26 | End: 2018-12-26

## 2018-12-26 RX ORDER — CELECOXIB 200 MG/1
CAPSULE ORAL
Qty: 30 CAPSULE | Refills: 0 | Status: SHIPPED | OUTPATIENT
Start: 2018-12-26 | End: 2019-01-24

## 2018-12-26 RX ORDER — METOPROLOL SUCCINATE 50 MG/1
TABLET, EXTENDED RELEASE ORAL
Qty: 180 TABLET | Refills: 0 | Status: SHIPPED | OUTPATIENT
Start: 2018-12-26 | End: 2019-03-29

## 2018-12-26 RX ORDER — PANTOPRAZOLE SODIUM 40 MG/1
40 TABLET, DELAYED RELEASE ORAL
Qty: 30 TABLET | Refills: 0 | Status: SHIPPED | OUTPATIENT
Start: 2018-12-26 | End: 2018-12-26

## 2018-12-26 RX ORDER — PANTOPRAZOLE SODIUM 40 MG/1
TABLET, DELAYED RELEASE ORAL
Qty: 90 TABLET | Refills: 0 | Status: SHIPPED | OUTPATIENT
Start: 2018-12-26 | End: 2019-10-17

## 2019-01-02 ENCOUNTER — PRIOR ORIGINAL RECORDS (OUTPATIENT)
Dept: OTHER | Age: 64
End: 2019-01-02

## 2019-01-02 ENCOUNTER — HOSPITAL ENCOUNTER (OUTPATIENT)
Dept: GENERAL RADIOLOGY | Facility: HOSPITAL | Age: 64
Discharge: HOME | End: 2019-01-02
Attending: THORACIC SURGERY (CARDIOTHORACIC VASCULAR SURGERY)
Payer: COMMERCIAL

## 2019-01-02 ENCOUNTER — HOSPITAL ENCOUNTER (OUTPATIENT)
Dept: LAB | Facility: HOSPITAL | Age: 64
Discharge: HOME | End: 2019-01-02
Attending: INTERNAL MEDICINE
Payer: COMMERCIAL

## 2019-01-02 ENCOUNTER — MYAURORA ACCOUNT LINK (OUTPATIENT)
Dept: OTHER | Age: 64
End: 2019-01-02

## 2019-01-02 DIAGNOSIS — Z87.74 S/P SURGERY FOR COMPLEX CONGENITAL HEART DISEASE: ICD-10-CM

## 2019-01-02 LAB — SED RATE-ML: 40 MM/HR (ref 0–12)

## 2019-01-02 PROCEDURE — 71048 X-RAY EXAM CHEST 4+ VIEWS: CPT | Performed by: THORACIC SURGERY (CARDIOTHORACIC VASCULAR SURGERY)

## 2019-01-02 PROCEDURE — 85652 RBC SED RATE AUTOMATED: CPT | Performed by: INTERNAL MEDICINE

## 2019-01-02 PROCEDURE — 36415 COLL VENOUS BLD VENIPUNCTURE: CPT | Performed by: INTERNAL MEDICINE

## 2019-01-03 ENCOUNTER — TELEPHONE (OUTPATIENT)
Dept: CARDIOLOGY UNIT | Facility: HOSPITAL | Age: 64
End: 2019-01-03

## 2019-01-03 ENCOUNTER — OFFICE VISIT (OUTPATIENT)
Dept: FAMILY MEDICINE CLINIC | Facility: CLINIC | Age: 64
End: 2019-01-03
Payer: COMMERCIAL

## 2019-01-03 VITALS
BODY MASS INDEX: 28.84 KG/M2 | RESPIRATION RATE: 16 BRPM | WEIGHT: 206 LBS | SYSTOLIC BLOOD PRESSURE: 130 MMHG | HEART RATE: 58 BPM | TEMPERATURE: 98 F | DIASTOLIC BLOOD PRESSURE: 80 MMHG | HEIGHT: 71 IN | OXYGEN SATURATION: 99 %

## 2019-01-03 DIAGNOSIS — I10 ESSENTIAL HYPERTENSION: ICD-10-CM

## 2019-01-03 DIAGNOSIS — Z95.1 STATUS POST THREE VESSEL CORONARY ARTERY BYPASS: ICD-10-CM

## 2019-01-03 DIAGNOSIS — Z23 NEED FOR VACCINATION: ICD-10-CM

## 2019-01-03 DIAGNOSIS — I25.10 CORONARY ARTERY DISEASE INVOLVING NATIVE HEART WITHOUT ANGINA PECTORIS, UNSPECIFIED VESSEL OR LESION TYPE: Primary | ICD-10-CM

## 2019-01-03 DIAGNOSIS — E78.5 HYPERLIPIDEMIA, UNSPECIFIED HYPERLIPIDEMIA TYPE: ICD-10-CM

## 2019-01-03 PROCEDURE — 90471 IMMUNIZATION ADMIN: CPT | Performed by: FAMILY MEDICINE

## 2019-01-03 PROCEDURE — 90686 IIV4 VACC NO PRSV 0.5 ML IM: CPT | Performed by: FAMILY MEDICINE

## 2019-01-03 PROCEDURE — 1111F DSCHRG MED/CURRENT MED MERGE: CPT | Performed by: FAMILY MEDICINE

## 2019-01-03 PROCEDURE — 99495 TRANSJ CARE MGMT MOD F2F 14D: CPT | Performed by: FAMILY MEDICINE

## 2019-01-03 NOTE — PROGRESS NOTES
Pt called re: sed rate and meaning, discussed inflammation s/s, pt denies night sweats, overt fatigue, sob, pain well managed, pt up and moving, ambulating often, only complaint is difficulty sleeping.   Discussed avoiding naps, melatonin or benadryl at nig

## 2019-01-07 ENCOUNTER — TELEPHONE (OUTPATIENT)
Dept: FAMILY MEDICINE CLINIC | Facility: CLINIC | Age: 64
End: 2019-01-07

## 2019-01-07 DIAGNOSIS — F41.9 ANXIETY: ICD-10-CM

## 2019-01-07 DIAGNOSIS — I10 ESSENTIAL HYPERTENSION, BENIGN: Primary | ICD-10-CM

## 2019-01-07 DIAGNOSIS — Z95.1 S/P CABG (CORONARY ARTERY BYPASS GRAFT): ICD-10-CM

## 2019-01-07 RX ORDER — ALPRAZOLAM 0.25 MG/1
0.25 TABLET ORAL EVERY 6 HOURS PRN
Qty: 30 TABLET | Refills: 1 | Status: SHIPPED | OUTPATIENT
Start: 2019-01-07 | End: 2019-10-17

## 2019-01-07 NOTE — PROGRESS NOTES
HPI:    Addi Del Angel is a 59year old male here today for hospital follow up.    He was discharged from Inpatient hospital, [facility] to Home   Admission Date: 12/17/18   Discharge Date: 12/21/18    Hospital Discharge Diagnoses (since 12/7/2018) MG) BY MOUTH EVERY DAY   METOPROLOL SUCCINATE ER 50 MG Oral Tablet 24 Hr TAKE 1 TABLET(50 MG) BY MOUTH TWICE DAILY   acetaminophen 500 MG Oral Tab Take 500 mg by mouth every 6 (six) hours as needed for Pain.    TraMADol HCl 50 MG Oral Tab Take 1 tablet (50 never smoked. he has never used smokeless tobacco. He reports that he drinks alcohol. He reports that he does not use drugs.      ROS:   GENERAL: weight stable, energy stable, no sweating  SKIN: denies any unusual skin lesions  EYES: denies blurred vision o bypass  -     CBC WITH DIFFERENTIAL WITH PLATELET; Future  -     MAGNESIUM; Future    Essential hypertension  -     COMP METABOLIC PANEL (14); Future    Hyperlipidemia, unspecified hyperlipidemia type  -     COMP METABOLIC PANEL (14);  Future    Need for va

## 2019-01-10 NOTE — PAYOR COMM NOTE
--------------  DISCHARGE REVIEW    Payor: [facility] [employer]  Subscriber #:  [id number]  Authorization Number: [id number]    Admit date: 12/17/18  Admit time:  5747  Discharge Date: 12/21/2018  3:22 PM         Admit date: 12/17/2018

## 2019-01-14 ENCOUNTER — PRIOR ORIGINAL RECORDS (OUTPATIENT)
Dept: OTHER | Age: 64
End: 2019-01-14

## 2019-01-14 LAB — ESR (SED RATE): 40 MM/HR

## 2019-01-16 ENCOUNTER — HOSPITAL ENCOUNTER (OUTPATIENT)
Dept: LAB | Facility: HOSPITAL | Age: 64
Discharge: HOME | End: 2019-01-16
Attending: INTERNAL MEDICINE
Payer: COMMERCIAL

## 2019-01-16 ENCOUNTER — TELEPHONE (OUTPATIENT)
Dept: FAMILY MEDICINE CLINIC | Facility: CLINIC | Age: 64
End: 2019-01-16

## 2019-01-16 LAB — SED RATE-ML: 13 MM/HR (ref 0–12)

## 2019-01-16 PROCEDURE — 36415 COLL VENOUS BLD VENIPUNCTURE: CPT | Performed by: INTERNAL MEDICINE

## 2019-01-16 PROCEDURE — 85652 RBC SED RATE AUTOMATED: CPT | Performed by: INTERNAL MEDICINE

## 2019-01-16 NOTE — TELEPHONE ENCOUNTER
Dr Jacob Cohen rec'd fax from [address]. Per Dr Rosalie Ahumada:  \"Hb has increased above 13.4-great! Chem panel-sl slow albumin, maintain adequate protein intake should come up without problem\"    I have called pt and relayed above feedback.  He voiced Mariel

## 2019-01-21 ENCOUNTER — HOSPITAL ENCOUNTER (OUTPATIENT)
Dept: CV DIAGNOSTICS | Facility: HOSPITAL | Age: 64
Discharge: HOME | End: 2019-01-21
Attending: INTERNAL MEDICINE
Payer: COMMERCIAL

## 2019-01-21 DIAGNOSIS — I25.10 CORONARY ATHEROSCLEROSIS OF NATIVE CORONARY ARTERY: ICD-10-CM

## 2019-01-21 PROCEDURE — 93017 CV STRESS TEST TRACING ONLY: CPT | Performed by: INTERNAL MEDICINE

## 2019-01-21 PROCEDURE — 93018 CV STRESS TEST I&R ONLY: CPT | Performed by: INTERNAL MEDICINE

## 2019-01-21 RX ORDER — ATORVASTATIN CALCIUM 40 MG/1
TABLET, FILM COATED ORAL
Qty: 90 TABLET | Refills: 0 | Status: SHIPPED | OUTPATIENT
Start: 2019-01-21 | End: 2019-03-27

## 2019-01-24 ENCOUNTER — CARDPULM VISIT (OUTPATIENT)
Dept: CARDIAC REHAB | Facility: HOSPITAL | Age: 64
End: 2019-01-24
Attending: INTERNAL MEDICINE
Payer: COMMERCIAL

## 2019-01-24 VITALS
HEART RATE: 65 BPM | WEIGHT: 202.19 LBS | OXYGEN SATURATION: 95 % | SYSTOLIC BLOOD PRESSURE: 106 MMHG | DIASTOLIC BLOOD PRESSURE: 64 MMHG | BODY MASS INDEX: 28.31 KG/M2 | HEIGHT: 71 IN

## 2019-01-28 ENCOUNTER — CARDPULM VISIT (OUTPATIENT)
Dept: CARDIAC REHAB | Facility: HOSPITAL | Age: 64
End: 2019-01-28
Attending: INTERNAL MEDICINE
Payer: COMMERCIAL

## 2019-01-28 PROCEDURE — 93798 PHYS/QHP OP CAR RHAB W/ECG: CPT

## 2019-02-01 ENCOUNTER — CARDPULM VISIT (OUTPATIENT)
Dept: CARDIAC REHAB | Facility: HOSPITAL | Age: 64
End: 2019-02-01
Attending: INTERNAL MEDICINE
Payer: COMMERCIAL

## 2019-02-01 PROCEDURE — 93798 PHYS/QHP OP CAR RHAB W/ECG: CPT

## 2019-02-04 ENCOUNTER — PRIOR ORIGINAL RECORDS (OUTPATIENT)
Dept: OTHER | Age: 64
End: 2019-02-04

## 2019-02-04 ENCOUNTER — CARDPULM VISIT (OUTPATIENT)
Dept: CARDIAC REHAB | Facility: HOSPITAL | Age: 64
End: 2019-02-04
Attending: INTERNAL MEDICINE
Payer: COMMERCIAL

## 2019-02-04 ENCOUNTER — MYAURORA ACCOUNT LINK (OUTPATIENT)
Dept: OTHER | Age: 64
End: 2019-02-04

## 2019-02-04 PROCEDURE — 93798 PHYS/QHP OP CAR RHAB W/ECG: CPT

## 2019-02-06 ENCOUNTER — CARDPULM VISIT (OUTPATIENT)
Dept: CARDIAC REHAB | Facility: HOSPITAL | Age: 64
End: 2019-02-06
Attending: INTERNAL MEDICINE
Payer: COMMERCIAL

## 2019-02-06 PROCEDURE — 93798 PHYS/QHP OP CAR RHAB W/ECG: CPT

## 2019-02-07 DIAGNOSIS — I71.4 ABDOMINAL AORTIC ANEURYSM (AAA) WITHOUT RUPTURE (HCC): Primary | ICD-10-CM

## 2019-02-08 ENCOUNTER — CARDPULM VISIT (OUTPATIENT)
Dept: CARDIAC REHAB | Facility: HOSPITAL | Age: 64
End: 2019-02-08
Attending: INTERNAL MEDICINE
Payer: COMMERCIAL

## 2019-02-08 PROCEDURE — 93798 PHYS/QHP OP CAR RHAB W/ECG: CPT

## 2019-02-11 ENCOUNTER — CARDPULM VISIT (OUTPATIENT)
Dept: CARDIAC REHAB | Facility: HOSPITAL | Age: 64
End: 2019-02-11
Attending: INTERNAL MEDICINE
Payer: COMMERCIAL

## 2019-02-11 PROCEDURE — 93798 PHYS/QHP OP CAR RHAB W/ECG: CPT

## 2019-02-13 ENCOUNTER — CARDPULM VISIT (OUTPATIENT)
Dept: CARDIAC REHAB | Facility: HOSPITAL | Age: 64
End: 2019-02-13
Attending: INTERNAL MEDICINE
Payer: COMMERCIAL

## 2019-02-13 PROCEDURE — 93798 PHYS/QHP OP CAR RHAB W/ECG: CPT

## 2019-02-15 ENCOUNTER — CARDPULM VISIT (OUTPATIENT)
Dept: CARDIAC REHAB | Facility: HOSPITAL | Age: 64
End: 2019-02-15
Attending: INTERNAL MEDICINE
Payer: COMMERCIAL

## 2019-02-15 PROCEDURE — 93798 PHYS/QHP OP CAR RHAB W/ECG: CPT

## 2019-02-18 ENCOUNTER — CARDPULM VISIT (OUTPATIENT)
Dept: CARDIAC REHAB | Facility: HOSPITAL | Age: 64
End: 2019-02-18
Attending: INTERNAL MEDICINE
Payer: COMMERCIAL

## 2019-02-18 PROCEDURE — 93798 PHYS/QHP OP CAR RHAB W/ECG: CPT

## 2019-02-20 ENCOUNTER — CARDPULM VISIT (OUTPATIENT)
Dept: CARDIAC REHAB | Facility: HOSPITAL | Age: 64
End: 2019-02-20
Attending: INTERNAL MEDICINE
Payer: COMMERCIAL

## 2019-02-20 PROCEDURE — 93798 PHYS/QHP OP CAR RHAB W/ECG: CPT

## 2019-02-22 ENCOUNTER — PRIOR ORIGINAL RECORDS (OUTPATIENT)
Dept: OTHER | Age: 64
End: 2019-02-22

## 2019-02-22 ENCOUNTER — CARDPULM VISIT (OUTPATIENT)
Dept: CARDIAC REHAB | Facility: HOSPITAL | Age: 64
End: 2019-02-22
Attending: INTERNAL MEDICINE
Payer: COMMERCIAL

## 2019-02-22 PROCEDURE — 93798 PHYS/QHP OP CAR RHAB W/ECG: CPT

## 2019-02-25 ENCOUNTER — CARDPULM VISIT (OUTPATIENT)
Dept: CARDIAC REHAB | Facility: HOSPITAL | Age: 64
End: 2019-02-25
Attending: INTERNAL MEDICINE
Payer: COMMERCIAL

## 2019-02-25 PROCEDURE — 93798 PHYS/QHP OP CAR RHAB W/ECG: CPT

## 2019-02-27 ENCOUNTER — CARDPULM VISIT (OUTPATIENT)
Dept: CARDIAC REHAB | Facility: HOSPITAL | Age: 64
End: 2019-02-27
Attending: INTERNAL MEDICINE
Payer: COMMERCIAL

## 2019-02-27 PROCEDURE — 93798 PHYS/QHP OP CAR RHAB W/ECG: CPT

## 2019-02-28 VITALS
SYSTOLIC BLOOD PRESSURE: 124 MMHG | BODY MASS INDEX: 28.42 KG/M2 | HEIGHT: 71 IN | HEART RATE: 84 BPM | WEIGHT: 203 LBS | DIASTOLIC BLOOD PRESSURE: 70 MMHG

## 2019-02-28 VITALS
HEIGHT: 71 IN | BODY MASS INDEX: 28.56 KG/M2 | DIASTOLIC BLOOD PRESSURE: 76 MMHG | HEART RATE: 90 BPM | SYSTOLIC BLOOD PRESSURE: 116 MMHG | WEIGHT: 204 LBS

## 2019-02-28 VITALS
BODY MASS INDEX: 30.94 KG/M2 | DIASTOLIC BLOOD PRESSURE: 76 MMHG | WEIGHT: 221 LBS | HEART RATE: 64 BPM | SYSTOLIC BLOOD PRESSURE: 136 MMHG | HEIGHT: 71 IN

## 2019-03-01 ENCOUNTER — CARDPULM VISIT (OUTPATIENT)
Dept: CARDIAC REHAB | Facility: HOSPITAL | Age: 64
End: 2019-03-01
Attending: INTERNAL MEDICINE
Payer: COMMERCIAL

## 2019-03-01 PROCEDURE — 93798 PHYS/QHP OP CAR RHAB W/ECG: CPT

## 2019-03-04 ENCOUNTER — CARDPULM VISIT (OUTPATIENT)
Dept: CARDIAC REHAB | Facility: HOSPITAL | Age: 64
End: 2019-03-04
Attending: INTERNAL MEDICINE
Payer: COMMERCIAL

## 2019-03-04 PROCEDURE — 93798 PHYS/QHP OP CAR RHAB W/ECG: CPT

## 2019-03-06 ENCOUNTER — CARDPULM VISIT (OUTPATIENT)
Dept: CARDIAC REHAB | Facility: HOSPITAL | Age: 64
End: 2019-03-06
Attending: INTERNAL MEDICINE
Payer: COMMERCIAL

## 2019-03-06 PROCEDURE — 93798 PHYS/QHP OP CAR RHAB W/ECG: CPT

## 2019-03-08 ENCOUNTER — CARDPULM VISIT (OUTPATIENT)
Dept: CARDIAC REHAB | Facility: HOSPITAL | Age: 64
End: 2019-03-08
Attending: INTERNAL MEDICINE
Payer: COMMERCIAL

## 2019-03-08 PROCEDURE — 93798 PHYS/QHP OP CAR RHAB W/ECG: CPT

## 2019-03-11 ENCOUNTER — APPOINTMENT (OUTPATIENT)
Dept: CARDIAC REHAB | Facility: HOSPITAL | Age: 64
End: 2019-03-11
Attending: INTERNAL MEDICINE
Payer: COMMERCIAL

## 2019-03-11 PROCEDURE — 93798 PHYS/QHP OP CAR RHAB W/ECG: CPT

## 2019-03-13 ENCOUNTER — APPOINTMENT (OUTPATIENT)
Dept: CARDIAC REHAB | Facility: HOSPITAL | Age: 64
End: 2019-03-13
Attending: INTERNAL MEDICINE
Payer: COMMERCIAL

## 2019-03-14 ENCOUNTER — CARDPULM VISIT (OUTPATIENT)
Dept: CARDIAC REHAB | Facility: HOSPITAL | Age: 64
End: 2019-03-14
Attending: INTERNAL MEDICINE
Payer: COMMERCIAL

## 2019-03-14 DIAGNOSIS — I71.40 ABDOMINAL ANEURYSM (CMD): Primary | ICD-10-CM

## 2019-03-14 PROCEDURE — 93798 PHYS/QHP OP CAR RHAB W/ECG: CPT

## 2019-03-15 ENCOUNTER — APPOINTMENT (OUTPATIENT)
Dept: CARDIAC REHAB | Facility: HOSPITAL | Age: 64
End: 2019-03-15
Attending: INTERNAL MEDICINE
Payer: COMMERCIAL

## 2019-03-15 PROCEDURE — 93798 PHYS/QHP OP CAR RHAB W/ECG: CPT

## 2019-03-18 ENCOUNTER — APPOINTMENT (OUTPATIENT)
Dept: CARDIAC REHAB | Facility: HOSPITAL | Age: 64
End: 2019-03-18
Attending: INTERNAL MEDICINE
Payer: COMMERCIAL

## 2019-03-18 PROCEDURE — 93798 PHYS/QHP OP CAR RHAB W/ECG: CPT

## 2019-03-20 ENCOUNTER — CARDPULM VISIT (OUTPATIENT)
Dept: CARDIAC REHAB | Facility: HOSPITAL | Age: 64
End: 2019-03-20
Attending: INTERNAL MEDICINE
Payer: COMMERCIAL

## 2019-03-20 PROCEDURE — 93798 PHYS/QHP OP CAR RHAB W/ECG: CPT

## 2019-03-22 ENCOUNTER — HOSPITAL ENCOUNTER (OUTPATIENT)
Dept: CARDIOLOGY CLINIC | Facility: HOSPITAL | Age: 64
Discharge: HOME | End: 2019-03-22
Attending: INTERNAL MEDICINE

## 2019-03-22 ENCOUNTER — APPOINTMENT (OUTPATIENT)
Dept: CARDIAC REHAB | Facility: HOSPITAL | Age: 64
End: 2019-03-22
Attending: INTERNAL MEDICINE
Payer: COMMERCIAL

## 2019-03-22 DIAGNOSIS — I71.4 ABDOMINAL ANEURYSM (HCC): ICD-10-CM

## 2019-03-22 PROCEDURE — 93978 VASCULAR STUDY: CPT | Performed by: INTERNAL MEDICINE

## 2019-03-22 PROCEDURE — 93798 PHYS/QHP OP CAR RHAB W/ECG: CPT

## 2019-03-25 ENCOUNTER — APPOINTMENT (OUTPATIENT)
Dept: CARDIAC REHAB | Facility: HOSPITAL | Age: 64
End: 2019-03-25
Attending: INTERNAL MEDICINE
Payer: COMMERCIAL

## 2019-03-25 DIAGNOSIS — I71.40 ABDOMINAL ANEURYSM (CMD): ICD-10-CM

## 2019-03-25 PROCEDURE — 93798 PHYS/QHP OP CAR RHAB W/ECG: CPT

## 2019-03-25 RX ORDER — ATORVASTATIN CALCIUM 40 MG/1
1 TABLET, FILM COATED ORAL DAILY
COMMUNITY
Start: 2018-10-16

## 2019-03-25 RX ORDER — METOPROLOL SUCCINATE 50 MG/1
1 TABLET, EXTENDED RELEASE ORAL 2 TIMES DAILY
COMMUNITY
Start: 2018-10-16

## 2019-03-27 ENCOUNTER — APPOINTMENT (OUTPATIENT)
Dept: CARDIAC REHAB | Facility: HOSPITAL | Age: 64
End: 2019-03-27
Attending: INTERNAL MEDICINE
Payer: COMMERCIAL

## 2019-03-27 PROCEDURE — 93798 PHYS/QHP OP CAR RHAB W/ECG: CPT

## 2019-03-27 RX ORDER — ATORVASTATIN CALCIUM 40 MG/1
TABLET, FILM COATED ORAL
Qty: 90 TABLET | Refills: 0 | Status: SHIPPED | OUTPATIENT
Start: 2019-03-27 | End: 2019-04-11

## 2019-03-29 ENCOUNTER — APPOINTMENT (OUTPATIENT)
Dept: CARDIAC REHAB | Facility: HOSPITAL | Age: 64
End: 2019-03-29
Attending: INTERNAL MEDICINE
Payer: COMMERCIAL

## 2019-03-29 DIAGNOSIS — I10 ESSENTIAL HYPERTENSION: ICD-10-CM

## 2019-03-29 PROCEDURE — 93798 PHYS/QHP OP CAR RHAB W/ECG: CPT

## 2019-03-30 LAB
ABSOLUTE BASOPHILS: 32 CELLS/UL (ref 0–200)
ABSOLUTE EOSINOPHILS: 297 CELLS/UL (ref 15–500)
ABSOLUTE LYMPHOCYTES: 756 CELLS/UL (ref 850–3900)
ABSOLUTE MONOCYTES: 610 CELLS/UL (ref 200–950)
ABSOLUTE NEUTROPHILS: 3704 CELLS/UL (ref 1500–7800)
ALBUMIN/GLOBULIN RATIO: 1.7 (CALC) (ref 1–2.5)
ALBUMIN: 4 G/DL (ref 3.6–5.1)
ALKALINE PHOSPHATASE: 64 U/L (ref 40–115)
ALT: 13 U/L (ref 9–46)
AST: 17 U/L (ref 10–35)
BASOPHILS: 0.6 %
BILIRUBIN, TOTAL: 0.7 MG/DL (ref 0.2–1.2)
BUN: 16 MG/DL (ref 7–25)
CALCIUM: 9.2 MG/DL (ref 8.6–10.3)
CARBON DIOXIDE: 32 MMOL/L (ref 20–32)
CHLORIDE: 103 MMOL/L (ref 98–110)
CREATININE: 1.14 MG/DL (ref 0.7–1.25)
EGFR IF AFRICN AM: 79 ML/MIN/1.73M2
EGFR IF NONAFRICN AM: 69 ML/MIN/1.73M2
EOSINOPHILS: 5.5 %
GLOBULIN: 2.3 G/DL (CALC) (ref 1.9–3.7)
GLUCOSE: 98 MG/DL (ref 65–99)
HEMATOCRIT: 47.2 % (ref 38.5–50)
HEMOGLOBIN: 15.6 G/DL (ref 13.2–17.1)
LYMPHOCYTES: 14 %
MAGNESIUM: 2.3 MG/DL (ref 1.5–2.5)
MCH: 28.5 PG (ref 27–33)
MCHC: 33.1 G/DL (ref 32–36)
MCV: 86.3 FL (ref 80–100)
MONOCYTES: 11.3 %
MPV: 11.4 FL (ref 7.5–12.5)
NEUTROPHILS: 68.6 %
PLATELET COUNT: 190 THOUSAND/UL (ref 140–400)
POTASSIUM: 4.5 MMOL/L (ref 3.5–5.3)
PROTEIN, TOTAL: 6.3 G/DL (ref 6.1–8.1)
RDW: 13.1 % (ref 11–15)
RED BLOOD CELL COUNT: 5.47 MILLION/UL (ref 4.2–5.8)
SODIUM: 140 MMOL/L (ref 135–146)
WHITE BLOOD CELL COUNT: 5.4 THOUSAND/UL (ref 3.8–10.8)

## 2019-03-30 RX ORDER — METOPROLOL SUCCINATE 50 MG/1
TABLET, EXTENDED RELEASE ORAL
Qty: 180 TABLET | Refills: 0 | Status: SHIPPED | OUTPATIENT
Start: 2019-03-30 | End: 2019-04-11

## 2019-04-01 ENCOUNTER — APPOINTMENT (OUTPATIENT)
Dept: CARDIAC REHAB | Facility: HOSPITAL | Age: 64
End: 2019-04-01
Attending: INTERNAL MEDICINE
Payer: COMMERCIAL

## 2019-04-01 ENCOUNTER — TELEPHONE (OUTPATIENT)
Dept: CARDIOLOGY | Age: 64
End: 2019-04-01

## 2019-04-01 DIAGNOSIS — I71.40 ABDOMINAL ANEURYSM (CMD): Primary | ICD-10-CM

## 2019-04-01 PROCEDURE — 93798 PHYS/QHP OP CAR RHAB W/ECG: CPT

## 2019-04-03 ENCOUNTER — APPOINTMENT (OUTPATIENT)
Dept: CARDIAC REHAB | Facility: HOSPITAL | Age: 64
End: 2019-04-03
Attending: INTERNAL MEDICINE
Payer: COMMERCIAL

## 2019-04-04 ENCOUNTER — CARDPULM VISIT (OUTPATIENT)
Dept: CARDIAC REHAB | Facility: HOSPITAL | Age: 64
End: 2019-04-04
Attending: INTERNAL MEDICINE
Payer: COMMERCIAL

## 2019-04-04 PROCEDURE — 93798 PHYS/QHP OP CAR RHAB W/ECG: CPT

## 2019-04-05 ENCOUNTER — CARDPULM VISIT (OUTPATIENT)
Dept: CARDIAC REHAB | Facility: HOSPITAL | Age: 64
End: 2019-04-05
Attending: INTERNAL MEDICINE
Payer: COMMERCIAL

## 2019-04-05 PROCEDURE — 93798 PHYS/QHP OP CAR RHAB W/ECG: CPT

## 2019-04-08 ENCOUNTER — CARDPULM VISIT (OUTPATIENT)
Dept: CARDIAC REHAB | Facility: HOSPITAL | Age: 64
End: 2019-04-08
Attending: INTERNAL MEDICINE
Payer: COMMERCIAL

## 2019-04-08 PROCEDURE — 93798 PHYS/QHP OP CAR RHAB W/ECG: CPT

## 2019-04-09 RX ORDER — CELECOXIB 200 MG/1
CAPSULE ORAL
Qty: 30 CAPSULE | Refills: 5 | Status: SHIPPED | OUTPATIENT
Start: 2019-04-09 | End: 2019-04-11

## 2019-04-10 ENCOUNTER — APPOINTMENT (OUTPATIENT)
Dept: CARDIAC REHAB | Facility: HOSPITAL | Age: 64
End: 2019-04-10
Attending: INTERNAL MEDICINE
Payer: COMMERCIAL

## 2019-04-10 PROCEDURE — 93798 PHYS/QHP OP CAR RHAB W/ECG: CPT

## 2019-04-11 ENCOUNTER — OFFICE VISIT (OUTPATIENT)
Dept: FAMILY MEDICINE CLINIC | Facility: CLINIC | Age: 64
End: 2019-04-11
Payer: COMMERCIAL

## 2019-04-11 VITALS
DIASTOLIC BLOOD PRESSURE: 70 MMHG | SYSTOLIC BLOOD PRESSURE: 120 MMHG | WEIGHT: 200 LBS | TEMPERATURE: 98 F | HEART RATE: 64 BPM | HEIGHT: 71 IN | RESPIRATION RATE: 14 BRPM | BODY MASS INDEX: 28 KG/M2

## 2019-04-11 DIAGNOSIS — G47.33 OSA ON CPAP: ICD-10-CM

## 2019-04-11 DIAGNOSIS — M15.9 PRIMARY OSTEOARTHRITIS INVOLVING MULTIPLE JOINTS: ICD-10-CM

## 2019-04-11 DIAGNOSIS — E78.5 HYPERLIPIDEMIA, UNSPECIFIED HYPERLIPIDEMIA TYPE: ICD-10-CM

## 2019-04-11 DIAGNOSIS — Z95.1 S/P CABG (CORONARY ARTERY BYPASS GRAFT): ICD-10-CM

## 2019-04-11 DIAGNOSIS — Z12.5 SCREENING FOR PROSTATE CANCER: ICD-10-CM

## 2019-04-11 DIAGNOSIS — Z13.89 SCREENING FOR GENITOURINARY CONDITION: ICD-10-CM

## 2019-04-11 DIAGNOSIS — I25.10 CORONARY ARTERY DISEASE INVOLVING NATIVE HEART WITHOUT ANGINA PECTORIS, UNSPECIFIED VESSEL OR LESION TYPE: ICD-10-CM

## 2019-04-11 DIAGNOSIS — I71.4 ABDOMINAL AORTIC ANEURYSM (AAA) WITHOUT RUPTURE (HCC): ICD-10-CM

## 2019-04-11 DIAGNOSIS — Z99.89 OSA ON CPAP: ICD-10-CM

## 2019-04-11 DIAGNOSIS — I10 ESSENTIAL HYPERTENSION, BENIGN: ICD-10-CM

## 2019-04-11 DIAGNOSIS — Z00.00 ANNUAL PHYSICAL EXAM: Primary | ICD-10-CM

## 2019-04-11 DIAGNOSIS — K21.9 GASTROESOPHAGEAL REFLUX DISEASE WITHOUT ESOPHAGITIS: ICD-10-CM

## 2019-04-11 PROCEDURE — 99396 PREV VISIT EST AGE 40-64: CPT | Performed by: FAMILY MEDICINE

## 2019-04-11 RX ORDER — CELECOXIB 200 MG/1
CAPSULE ORAL
Qty: 30 CAPSULE | Refills: 1 | Status: SHIPPED | OUTPATIENT
Start: 2019-04-11 | End: 2019-10-17

## 2019-04-11 RX ORDER — ATORVASTATIN CALCIUM 40 MG/1
40 TABLET, FILM COATED ORAL
Qty: 90 TABLET | Refills: 1 | Status: SHIPPED | OUTPATIENT
Start: 2019-04-11 | End: 2019-10-17

## 2019-04-11 RX ORDER — METOPROLOL SUCCINATE 50 MG/1
TABLET, EXTENDED RELEASE ORAL
Qty: 180 TABLET | Refills: 1 | Status: SHIPPED | OUTPATIENT
Start: 2019-04-11 | End: 2019-10-17

## 2019-04-11 NOTE — PROGRESS NOTES
Emilie Bustillo is a 59year old male who presents for a complete physical exam.   HPI:   Pt is here for a complete physical.  He is in the process of completing cardiac rehab following his CABG in 12/18.   He had a LIMA to the LAD, saphenous vein graft to Disp: 30 capsule Rfl: 1   acetaminophen 500 MG Oral Tab Take 500 mg by mouth every 6 (six) hours as needed for Pain. Disp:  Rfl:    aspirin 81 MG Oral Tab Take 81 mg by mouth daily. Disp:  Rfl:    multivitamin Oral Tab Take 1 tablet by mouth daily.  Disp: Comment: weekends/socially    Drug use: No     Occ: Salesman. : Yes. Children: 2 (1 child with cerebral palsy  following spinal surgery). Exercise: three times per week.   Diet: watches calories closely    REVIEW OF SYSTEMS:   GENERAL: feels hypertension, benign  Coronary artery disease involving native heart without angina pectoris, unspecified vessel or lesion type  S/p cabg (coronary artery bypass graft)  Hyperlipidemia, unspecified hyperlipidemia type  Primary osteoarthritis involving mult

## 2019-04-12 ENCOUNTER — APPOINTMENT (OUTPATIENT)
Dept: CARDIAC REHAB | Facility: HOSPITAL | Age: 64
End: 2019-04-12
Attending: INTERNAL MEDICINE
Payer: COMMERCIAL

## 2019-04-15 ENCOUNTER — CARDPULM VISIT (OUTPATIENT)
Dept: CARDIAC REHAB | Facility: HOSPITAL | Age: 64
End: 2019-04-15
Attending: INTERNAL MEDICINE
Payer: COMMERCIAL

## 2019-04-15 PROCEDURE — 93798 PHYS/QHP OP CAR RHAB W/ECG: CPT

## 2019-04-17 ENCOUNTER — APPOINTMENT (OUTPATIENT)
Dept: CARDIAC REHAB | Facility: HOSPITAL | Age: 64
End: 2019-04-17
Attending: INTERNAL MEDICINE
Payer: COMMERCIAL

## 2019-04-17 PROCEDURE — 93798 PHYS/QHP OP CAR RHAB W/ECG: CPT

## 2019-04-19 ENCOUNTER — CARDPULM VISIT (OUTPATIENT)
Dept: CARDIAC REHAB | Facility: HOSPITAL | Age: 64
End: 2019-04-19
Attending: INTERNAL MEDICINE
Payer: COMMERCIAL

## 2019-04-19 PROCEDURE — 93798 PHYS/QHP OP CAR RHAB W/ECG: CPT

## 2019-04-22 ENCOUNTER — CARDPULM VISIT (OUTPATIENT)
Dept: CARDIAC REHAB | Facility: HOSPITAL | Age: 64
End: 2019-04-22
Attending: INTERNAL MEDICINE
Payer: COMMERCIAL

## 2019-04-22 PROCEDURE — 93798 PHYS/QHP OP CAR RHAB W/ECG: CPT

## 2019-05-17 ENCOUNTER — OFFICE VISIT (OUTPATIENT)
Dept: CARDIOLOGY | Age: 64
End: 2019-05-17

## 2019-05-17 VITALS
DIASTOLIC BLOOD PRESSURE: 80 MMHG | HEIGHT: 71 IN | WEIGHT: 198 LBS | BODY MASS INDEX: 27.72 KG/M2 | SYSTOLIC BLOOD PRESSURE: 110 MMHG | HEART RATE: 53 BPM

## 2019-05-17 DIAGNOSIS — I25.10 CAD IN NATIVE ARTERY: ICD-10-CM

## 2019-05-17 DIAGNOSIS — I71.40 ABDOMINAL AORTIC ANEURYSM (AAA) WITHOUT RUPTURE (CMD): Primary | ICD-10-CM

## 2019-05-17 DIAGNOSIS — I10 HYPERTENSION, BENIGN: ICD-10-CM

## 2019-05-17 PROBLEM — E78.00 PURE HYPERCHOLESTEROLEMIA: Status: ACTIVE | Noted: 2019-01-02

## 2019-05-17 PROBLEM — G47.33 OBSTRUCTIVE SLEEP APNEA SYNDROME: Status: ACTIVE | Noted: 2019-01-02

## 2019-05-17 PROBLEM — Z95.1 HX OF CABG: Status: ACTIVE | Noted: 2019-01-02

## 2019-05-17 PROCEDURE — 3079F DIAST BP 80-89 MM HG: CPT | Performed by: NURSE PRACTITIONER

## 2019-05-17 PROCEDURE — 99214 OFFICE O/P EST MOD 30 MIN: CPT | Performed by: NURSE PRACTITIONER

## 2019-05-17 PROCEDURE — 3074F SYST BP LT 130 MM HG: CPT | Performed by: NURSE PRACTITIONER

## 2019-05-17 RX ORDER — CELECOXIB 200 MG/1
CAPSULE ORAL
COMMUNITY
Start: 2018-10-16

## 2019-05-17 ASSESSMENT — ENCOUNTER SYMPTOMS
HEMOPTYSIS: 0
HEMATOCHEZIA: 0
FEVER: 0
SUSPICIOUS LESIONS: 0
BRUISES/BLEEDS EASILY: 0
WEIGHT LOSS: 0
ALLERGIC/IMMUNOLOGIC COMMENTS: NO NEW FOOD ALLERGIES
COUGH: 0
CHILLS: 0
WEIGHT GAIN: 0

## 2019-05-21 NOTE — PROCEDURES
[address]    PATIENT'S NAME: Yudith Gomez   ATTENDING PHYSICIAN: Rosanna Worthy M.D. OPERATING PHYSICIAN: Rosanna Worthy M.D.    PATIENT ACCOUNT#:   [de-identified]    LOCATION:  [address]  MEDICAL RECORD #:   [id number]       DATE

## 2019-06-27 RX ORDER — ATORVASTATIN CALCIUM 40 MG/1
TABLET, FILM COATED ORAL
Qty: 90 TABLET | Refills: 1 | Status: SHIPPED | OUTPATIENT
Start: 2019-06-27 | End: 2019-07-25

## 2019-07-24 ENCOUNTER — TELEPHONE (OUTPATIENT)
Dept: CARDIOLOGY | Age: 64
End: 2019-07-24

## 2019-08-02 ENCOUNTER — APPOINTMENT (OUTPATIENT)
Dept: LAB | Facility: HOSPITAL | Age: 64
End: 2019-08-02
Payer: COMMERCIAL

## 2019-08-02 DIAGNOSIS — K40.90 RIGHT INGUINAL HERNIA: ICD-10-CM

## 2019-08-02 LAB
ANION GAP SERPL CALC-SCNC: 2 MMOL/L (ref 0–18)
ATRIAL RATE: 50 BPM
BUN BLD-MCNC: 13 MG/DL (ref 7–18)
BUN/CREAT SERPL: 11.8 (ref 10–20)
CALCIUM BLD-MCNC: 9.3 MG/DL (ref 8.5–10.1)
CHLORIDE SERPL-SCNC: 108 MMOL/L (ref 98–112)
CO2 SERPL-SCNC: 34 MMOL/L (ref 21–32)
CREAT BLD-MCNC: 1.1 MG/DL (ref 0.7–1.3)
GLUCOSE BLD-MCNC: 97 MG/DL (ref 70–99)
OSMOLALITY SERPL CALC.SUM OF ELEC: 298 MOSM/KG (ref 275–295)
P AXIS: 11 DEGREES
P-R INTERVAL: 176 MS
POTASSIUM SERPL-SCNC: 4.8 MMOL/L (ref 3.5–5.1)
Q-T INTERVAL: 410 MS
QRS DURATION: 94 MS
QTC CALCULATION (BEZET): 373 MS
R AXIS: -4 DEGREES
SODIUM SERPL-SCNC: 144 MMOL/L (ref 136–145)
T AXIS: 28 DEGREES
VENTRICULAR RATE: 50 BPM

## 2019-08-02 PROCEDURE — 93010 ELECTROCARDIOGRAM REPORT: CPT | Performed by: INTERNAL MEDICINE

## 2019-08-02 PROCEDURE — 36415 COLL VENOUS BLD VENIPUNCTURE: CPT

## 2019-08-02 PROCEDURE — 80048 BASIC METABOLIC PNL TOTAL CA: CPT

## 2019-08-02 PROCEDURE — 93005 ELECTROCARDIOGRAM TRACING: CPT

## 2019-08-05 ENCOUNTER — ANESTHESIA EVENT (OUTPATIENT)
Dept: SURGERY | Facility: HOSPITAL | Age: 64
End: 2019-08-05
Payer: COMMERCIAL

## 2019-08-20 ENCOUNTER — HOSPITAL ENCOUNTER (OUTPATIENT)
Facility: HOSPITAL | Age: 64
Setting detail: HOSPITAL OUTPATIENT SURGERY
Discharge: HOME | End: 2019-08-20
Attending: SURGERY | Admitting: SURGERY
Payer: COMMERCIAL

## 2019-08-20 ENCOUNTER — ANESTHESIA (OUTPATIENT)
Dept: SURGERY | Facility: HOSPITAL | Age: 64
End: 2019-08-20
Payer: COMMERCIAL

## 2019-08-20 VITALS
DIASTOLIC BLOOD PRESSURE: 74 MMHG | OXYGEN SATURATION: 99 % | BODY MASS INDEX: 25.9 KG/M2 | RESPIRATION RATE: 18 BRPM | SYSTOLIC BLOOD PRESSURE: 121 MMHG | WEIGHT: 185 LBS | HEART RATE: 57 BPM | HEIGHT: 71 IN | TEMPERATURE: 98 F

## 2019-08-20 DIAGNOSIS — K40.90 RIGHT INGUINAL HERNIA: Primary | ICD-10-CM

## 2019-08-20 PROCEDURE — 8E0W4CZ ROBOTIC ASSISTED PROCEDURE OF TRUNK REGION, PERCUTANEOUS ENDOSCOPIC APPROACH: ICD-10-PCS | Performed by: SURGERY

## 2019-08-20 PROCEDURE — 0YU54JZ SUPPLEMENT RIGHT INGUINAL REGION WITH SYNTHETIC SUBSTITUTE, PERCUTANEOUS ENDOSCOPIC APPROACH: ICD-10-PCS | Performed by: SURGERY

## 2019-08-20 DEVICE — PROGRIP MESH: Type: IMPLANTABLE DEVICE | Site: INGUINAL | Status: FUNCTIONAL

## 2019-08-20 RX ORDER — SODIUM CHLORIDE, SODIUM LACTATE, POTASSIUM CHLORIDE, CALCIUM CHLORIDE 600; 310; 30; 20 MG/100ML; MG/100ML; MG/100ML; MG/100ML
INJECTION, SOLUTION INTRAVENOUS CONTINUOUS
Status: DISCONTINUED | OUTPATIENT
Start: 2019-08-20 | End: 2019-08-20

## 2019-08-20 RX ORDER — NALOXONE HYDROCHLORIDE 0.4 MG/ML
80 INJECTION, SOLUTION INTRAMUSCULAR; INTRAVENOUS; SUBCUTANEOUS AS NEEDED
Status: DISCONTINUED | OUTPATIENT
Start: 2019-08-20 | End: 2019-08-20

## 2019-08-20 RX ORDER — ACETAMINOPHEN 500 MG
1000 TABLET ORAL ONCE
Status: DISCONTINUED | OUTPATIENT
Start: 2019-08-20 | End: 2019-08-20

## 2019-08-20 RX ORDER — HYDROMORPHONE HYDROCHLORIDE 1 MG/ML
INJECTION, SOLUTION INTRAMUSCULAR; INTRAVENOUS; SUBCUTANEOUS
Status: COMPLETED
Start: 2019-08-20 | End: 2019-08-20

## 2019-08-20 RX ORDER — TRAMADOL HYDROCHLORIDE 50 MG/1
50 TABLET ORAL EVERY 6 HOURS PRN
Status: DISCONTINUED | OUTPATIENT
Start: 2019-08-20 | End: 2019-08-20

## 2019-08-20 RX ORDER — ONDANSETRON 2 MG/ML
4 INJECTION INTRAMUSCULAR; INTRAVENOUS AS NEEDED
Status: DISCONTINUED | OUTPATIENT
Start: 2019-08-20 | End: 2019-08-20

## 2019-08-20 RX ORDER — TRAMADOL HYDROCHLORIDE 50 MG/1
50 TABLET ORAL EVERY 6 HOURS PRN
Qty: 30 TABLET | Refills: 1 | Status: SHIPPED | OUTPATIENT
Start: 2019-08-20 | End: 2019-08-28

## 2019-08-20 RX ORDER — CEFAZOLIN SODIUM/WATER 2 G/20 ML
2 SYRINGE (ML) INTRAVENOUS ONCE
Status: COMPLETED | OUTPATIENT
Start: 2019-08-20 | End: 2019-08-20

## 2019-08-20 RX ORDER — HYDROCODONE BITARTRATE AND ACETAMINOPHEN 5; 325 MG/1; MG/1
1 TABLET ORAL AS NEEDED
Status: DISCONTINUED | OUTPATIENT
Start: 2019-08-20 | End: 2019-08-20

## 2019-08-20 RX ORDER — BUPIVACAINE HYDROCHLORIDE 5 MG/ML
INJECTION, SOLUTION EPIDURAL; INTRACAUDAL AS NEEDED
Status: DISCONTINUED | OUTPATIENT
Start: 2019-08-20 | End: 2019-08-20 | Stop reason: HOSPADM

## 2019-08-20 RX ORDER — HYDROMORPHONE HYDROCHLORIDE 1 MG/ML
0.4 INJECTION, SOLUTION INTRAMUSCULAR; INTRAVENOUS; SUBCUTANEOUS EVERY 5 MIN PRN
Status: DISCONTINUED | OUTPATIENT
Start: 2019-08-20 | End: 2019-08-20

## 2019-08-20 RX ORDER — HYDROCODONE BITARTRATE AND ACETAMINOPHEN 5; 325 MG/1; MG/1
1-2 TABLET ORAL EVERY 4 HOURS PRN
Qty: 20 TABLET | Refills: 0 | Status: SHIPPED | OUTPATIENT
Start: 2019-08-20 | End: 2019-08-28

## 2019-08-20 RX ORDER — LIDOCAINE HYDROCHLORIDE AND EPINEPHRINE 10; 10 MG/ML; UG/ML
INJECTION, SOLUTION INFILTRATION; PERINEURAL AS NEEDED
Status: DISCONTINUED | OUTPATIENT
Start: 2019-08-20 | End: 2019-08-20 | Stop reason: HOSPADM

## 2019-08-20 RX ORDER — HYDROCODONE BITARTRATE AND ACETAMINOPHEN 5; 325 MG/1; MG/1
2 TABLET ORAL AS NEEDED
Status: DISCONTINUED | OUTPATIENT
Start: 2019-08-20 | End: 2019-08-20

## 2019-08-20 NOTE — BRIEF OP NOTE
Pre-Operative Diagnosis: Right inguinal hernia [K40.90]     Post-Operative Diagnosis: Right inguinal hernia [K40.90]      Procedure Performed:   Procedure(s):  XI ROBOT ASSISTED RIGHT INGUINAL HERNIA REPAIR WITH MESH    Surgeon(s) and Role:     * Trenda Canton,

## 2019-08-20 NOTE — OPERATIVE REPORT
[facility]    PATIENT'S NAME: David Calvin   ATTENDING PHYSICIAN: Adi Perera M.D. OPERATING PHYSICIAN: Adi Perera M.D.    PATIENT ACCOUNT#:   [de-identified]    LOCATION:  [address] [place] [address] 10  MEDICAL RECORD #:   [id number] observation.     Dictated By Candy Gross M.D.  d: 08/20/2019 15:19:24  t: 08/20/2019 17:05:07  [facility] [id number]  LCM/

## 2019-08-20 NOTE — H&P
History & Physical Examination    Patient Name: Emilie Bustillo  MRN: [id number]  [facility]: [id number]  YOB: 1955    Diagnosis: right inguinal hernia          Medications Prior to Admission:  Saw [place], Serenoa repens, (SAW PALMETTO OR) Take by HYDROcodone-acetaminophen (NORCO) 5-325 MG per tab 1 tablet 1 tablet Oral PRN   Or      HYDROcodone-acetaminophen (NORCO) 5-325 MG per tab 2 tablet 2 tablet Oral PRN   ondansetron HCl (ZOFRAN) injection 4 mg 4 mg Intravenous PRN       Allergies: No Known ABDOMEN [ ] [ ] Right inguinal hernia   UROGENITAL [ ] [ ]    EXTREMITIES [x ] [ x]    OTHER      Robotic assisted repair right inguinal hernia with mesh  [ x ] I have discussed the risks and benefits and alternatives with the patient/family.   They under

## 2019-08-20 NOTE — ANESTHESIA PREPROCEDURE EVALUATION
PRE-OP EVALUATION    Patient Name: Lee Kimble    Pre-op Diagnosis: Right inguinal hernia [K40.90]    Procedure(s):  XI ROBOT ASSISTED RIGHT INGUINAL HERNIA REPAIR WITH MESH    Surgeon(s) and Role:     * Lula Stringer MD - Primary    Pre-op vitals re • ANGIOGRAM     • BYPASS SURGERY     • CABG  12/17/2018    TRIPLE BYPASS   • CARDIAC CATHETERIZATION  12/07/2018    DR NEFF   • CATH ANGIO  12/07/2018    DR. NEFF   • COLONOSCOPY  2001, 2006, 2011   • COLONOSCOPY, POSSIBLE BIOPSY, POSSIBLE POLYPECTO

## 2019-08-20 NOTE — ANESTHESIA POSTPROCEDURE EVALUATION
[facility] Patient Status:  Hospital Outpatient Surgery   Age/Gender 62year old male MRN [id number]   Location [address] [place] [address] Attending Vivian See, [address] [facility] Day # 0 PCP MD Ana Kraft

## 2019-10-01 RX ORDER — CELECOXIB 200 MG/1
CAPSULE ORAL
Qty: 30 CAPSULE | Refills: 2 | Status: SHIPPED | OUTPATIENT
Start: 2019-10-01 | End: 2019-10-17

## 2019-10-04 ENCOUNTER — HOSPITAL ENCOUNTER (OUTPATIENT)
Dept: CARDIOLOGY CLINIC | Facility: HOSPITAL | Age: 64
Discharge: HOME | End: 2019-10-04
Attending: INTERNAL MEDICINE
Payer: COMMERCIAL

## 2019-10-04 ENCOUNTER — HOSPITAL ENCOUNTER (OUTPATIENT)
Dept: LAB | Facility: HOSPITAL | Age: 64
Discharge: HOME | End: 2019-10-04
Attending: FAMILY MEDICINE
Payer: COMMERCIAL

## 2019-10-04 DIAGNOSIS — I71.4 ABDOMINAL ANEURYSM (HCC): ICD-10-CM

## 2019-10-04 PROCEDURE — 84443 ASSAY THYROID STIM HORMONE: CPT | Performed by: FAMILY MEDICINE

## 2019-10-04 PROCEDURE — 85025 COMPLETE CBC W/AUTO DIFF WBC: CPT | Performed by: FAMILY MEDICINE

## 2019-10-04 PROCEDURE — 93978 VASCULAR STUDY: CPT | Performed by: INTERNAL MEDICINE

## 2019-10-04 PROCEDURE — 80061 LIPID PANEL: CPT | Performed by: FAMILY MEDICINE

## 2019-10-04 PROCEDURE — 80053 COMPREHEN METABOLIC PANEL: CPT | Performed by: FAMILY MEDICINE

## 2019-10-04 PROCEDURE — 36415 COLL VENOUS BLD VENIPUNCTURE: CPT | Performed by: FAMILY MEDICINE

## 2019-10-04 PROCEDURE — 84153 ASSAY OF PSA TOTAL: CPT | Performed by: FAMILY MEDICINE

## 2019-10-07 DIAGNOSIS — I71.40 ABDOMINAL ANEURYSM (CMD): ICD-10-CM

## 2019-10-08 ENCOUNTER — E-ADVICE (OUTPATIENT)
Dept: CARDIOLOGY | Age: 64
End: 2019-10-08

## 2019-10-09 ENCOUNTER — E-ADVICE (OUTPATIENT)
Dept: CARDIOLOGY | Age: 64
End: 2019-10-09

## 2019-10-17 ENCOUNTER — OFFICE VISIT (OUTPATIENT)
Dept: FAMILY MEDICINE CLINIC | Facility: CLINIC | Age: 64
End: 2019-10-17
Payer: COMMERCIAL

## 2019-10-17 VITALS
BODY MASS INDEX: 26.88 KG/M2 | DIASTOLIC BLOOD PRESSURE: 84 MMHG | HEART RATE: 72 BPM | HEIGHT: 71 IN | SYSTOLIC BLOOD PRESSURE: 130 MMHG | TEMPERATURE: 98 F | WEIGHT: 192 LBS | RESPIRATION RATE: 12 BRPM

## 2019-10-17 DIAGNOSIS — I10 ESSENTIAL HYPERTENSION, BENIGN: ICD-10-CM

## 2019-10-17 DIAGNOSIS — Z23 NEED FOR VACCINATION: ICD-10-CM

## 2019-10-17 DIAGNOSIS — I71.4 ABDOMINAL AORTIC ANEURYSM (AAA) WITHOUT RUPTURE (HCC): ICD-10-CM

## 2019-10-17 DIAGNOSIS — M15.9 PRIMARY OSTEOARTHRITIS INVOLVING MULTIPLE JOINTS: ICD-10-CM

## 2019-10-17 DIAGNOSIS — E78.5 HYPERLIPIDEMIA, UNSPECIFIED HYPERLIPIDEMIA TYPE: ICD-10-CM

## 2019-10-17 DIAGNOSIS — I25.10 CORONARY ARTERY DISEASE INVOLVING NATIVE HEART WITHOUT ANGINA PECTORIS, UNSPECIFIED VESSEL OR LESION TYPE: Primary | ICD-10-CM

## 2019-10-17 PROCEDURE — 90686 IIV4 VACC NO PRSV 0.5 ML IM: CPT | Performed by: FAMILY MEDICINE

## 2019-10-17 PROCEDURE — 90471 IMMUNIZATION ADMIN: CPT | Performed by: FAMILY MEDICINE

## 2019-10-17 PROCEDURE — 99214 OFFICE O/P EST MOD 30 MIN: CPT | Performed by: FAMILY MEDICINE

## 2019-10-17 RX ORDER — METOPROLOL SUCCINATE 50 MG/1
TABLET, EXTENDED RELEASE ORAL
Qty: 180 TABLET | Refills: 1 | Status: SHIPPED | OUTPATIENT
Start: 2019-10-17 | End: 2020-06-23

## 2019-10-17 RX ORDER — PANTOPRAZOLE SODIUM 40 MG/1
TABLET, DELAYED RELEASE ORAL
Qty: 90 TABLET | Refills: 0 | Status: ON HOLD | OUTPATIENT
Start: 2019-10-17 | End: 2021-06-26

## 2019-10-17 RX ORDER — CELECOXIB 200 MG/1
CAPSULE ORAL
Qty: 90 CAPSULE | Refills: 1 | Status: SHIPPED | OUTPATIENT
Start: 2019-10-17 | End: 2020-06-08

## 2019-10-17 RX ORDER — ATORVASTATIN CALCIUM 40 MG/1
40 TABLET, FILM COATED ORAL
Qty: 90 TABLET | Refills: 1 | Status: SHIPPED | OUTPATIENT
Start: 2019-10-17 | End: 2020-06-23

## 2019-10-18 ENCOUNTER — MED REC SCAN ONLY (OUTPATIENT)
Dept: FAMILY MEDICINE CLINIC | Facility: CLINIC | Age: 64
End: 2019-10-18

## 2019-10-18 NOTE — PROGRESS NOTES
HPI:   Pt is here for follow-up of his CAD and hypercholesterolemia. He did complete cardiac rehab following his CABG in 12/18. He had a LIMA to the LAD, saphenous vein graft to a diagonal and posterior descending. He has been doing well.    He has notic 50 MG Oral Tablet 24 Hr, TAKE 1 TABLET(50 MG) BY MOUTH TWICE DAILY, Disp: 180 tablet, Rfl: 1  Saw [place], Serenoa repens, (SAW PALMETTO OR), Take by mouth daily. , Disp: , Rfl:   Coenzyme Q10 (CO Q 10 OR), Take by mouth daily. , Disp: , Rfl:   aspirin 81 M otherwise  SKIN: denies any unusual skin lesions or rashes  RESPIRATORY: denies shortness of breath with exertion  CARDIOVASCULAR: denies chest pain on exertion  GI: denies abdominal pain and denies heartburn  NEURO: denies headaches    EXAM:   /84

## 2019-10-28 ENCOUNTER — OFFICE VISIT (OUTPATIENT)
Dept: CARDIOLOGY | Age: 64
End: 2019-10-28

## 2019-10-28 VITALS
SYSTOLIC BLOOD PRESSURE: 126 MMHG | DIASTOLIC BLOOD PRESSURE: 78 MMHG | HEART RATE: 60 BPM | WEIGHT: 195 LBS | BODY MASS INDEX: 27.3 KG/M2 | HEIGHT: 71 IN

## 2019-10-28 DIAGNOSIS — Z95.1 HX OF CABG: ICD-10-CM

## 2019-10-28 DIAGNOSIS — I71.40 ABDOMINAL AORTIC ANEURYSM (AAA) WITHOUT RUPTURE (CMD): Primary | ICD-10-CM

## 2019-10-28 DIAGNOSIS — I25.10 CAD IN NATIVE ARTERY: ICD-10-CM

## 2019-10-28 DIAGNOSIS — I10 HYPERTENSION, BENIGN: ICD-10-CM

## 2019-10-28 DIAGNOSIS — E78.00 PURE HYPERCHOLESTEROLEMIA: ICD-10-CM

## 2019-10-28 PROCEDURE — 99215 OFFICE O/P EST HI 40 MIN: CPT | Performed by: INTERNAL MEDICINE

## 2019-10-28 PROCEDURE — 3074F SYST BP LT 130 MM HG: CPT | Performed by: INTERNAL MEDICINE

## 2019-10-28 PROCEDURE — 3078F DIAST BP <80 MM HG: CPT | Performed by: INTERNAL MEDICINE

## 2019-10-28 ASSESSMENT — ENCOUNTER SYMPTOMS
SUSPICIOUS LESIONS: 0
HEMOPTYSIS: 0
BRUISES/BLEEDS EASILY: 0
COUGH: 0
FEVER: 0
HEMATOCHEZIA: 0
ALLERGIC/IMMUNOLOGIC COMMENTS: NO NEW FOOD ALLERGIES
CHILLS: 0
WEIGHT LOSS: 0
WEIGHT GAIN: 0

## 2019-10-29 ENCOUNTER — TELEPHONE (OUTPATIENT)
Dept: CARDIOLOGY | Age: 64
End: 2019-10-29

## 2019-11-12 ENCOUNTER — MED REC SCAN ONLY (OUTPATIENT)
Dept: FAMILY MEDICINE CLINIC | Facility: CLINIC | Age: 64
End: 2019-11-12

## 2019-11-18 DIAGNOSIS — Z99.89 OSA ON CPAP: Primary | ICD-10-CM

## 2019-11-18 DIAGNOSIS — G47.33 OSA ON CPAP: Primary | ICD-10-CM

## 2019-12-04 ENCOUNTER — OFFICE VISIT (OUTPATIENT)
Dept: SLEEP CENTER | Age: 64
End: 2019-12-04
Attending: INTERNAL MEDICINE
Payer: COMMERCIAL

## 2019-12-04 PROCEDURE — 95810 POLYSOM 6/> YRS 4/> PARAM: CPT

## 2019-12-09 NOTE — PROCEDURES
[address] [address]       Accredited by the [facility] Sleep [facility] ([facility])    PATIENT'S NAME:        Rober Washburn  ATTENDING PHYSICIAN:   Jazzy Islas M.D.   REFERRING PHYSICIAN:   eBltran Decker M.D. was central). This corresponds to an apnea and hypopnea index of 3.4, which is within normal limits. The oxygen nicki is 88%. The patient spent 99% of the record with a saturation above 90%. PERIODIC LIMB MOVEMENTS:  PLM index is 4.6.   PLM arousal in

## 2020-03-10 NOTE — PLAN OF CARE
Medicare Preventive Visit Patient Instructions  Thank you for completing your Welcome to Medicare Visit or Medicare Annual Wellness Visit today  Your next wellness visit will be due in one year (3/10/2021)  The screening/preventive services that you may require over the next 5-10 years are detailed below  Some tests may not apply to you based off risk factors and/or age  Screening tests ordered at today's visit but not completed yet may show as past due  Also, please note that scanned in results may not display below  Preventive Screenings:  Service Recommendations Previous Testing/Comments   Colorectal Cancer Screening  · Colonoscopy    · Fecal Occult Blood Test (FOBT)/Fecal Immunochemical Test (FIT)  · Fecal DNA/Cologuard Test  · Flexible Sigmoidoscopy Age: 54-65 years old   Colonoscopy: every 10 years (May be performed more frequently if at higher risk)  OR  FOBT/FIT: every 1 year  OR  Cologuard: every 3 years  OR  Sigmoidoscopy: every 5 years  Screening may be recommended earlier than age 48 if at higher risk for colorectal cancer  Also, an individualized decision between you and your healthcare provider will decide whether screening between the ages of 74-80 would be appropriate   Colonoscopy: Not on file  FOBT/FIT: Not on file  Cologuard: Not on file  Sigmoidoscopy: Not on file         Prostate Cancer Screening Individualized decision between patient and health care provider in men between ages of 53-78   Medicare will cover every 12 months beginning on the day after your 50th birthday PSA: 0 7 ng/mL          Hepatitis C Screening Once for adults born between 1945 and 1965  More frequently in patients at high risk for Hepatitis C Hep C Antibody: Not on file       Diabetes Screening 1-2 times per year if you're at risk for diabetes or have pre-diabetes Fasting glucose: 164 mg/dL   A1C: 8 3 %    Screening Not Indicated  History Diabetes   Cholesterol Screening Once every 5 years if you don't have a lipid Problem: Impaired Functional Mobility  Goal: Achieve highest/safest level of mobility/gait  Interventions:  - Assess patient's functional ability and stability  - Promote increasing activity/tolerance for mobility and gait  - Educate and engage patient/fam disorder  May order more often based on risk factors  Lipid panel: 02/06/2017    Screening Current      Other Preventive Screenings Covered by Medicare:  1  Abdominal Aortic Aneurysm (AAA) Screening: covered once if your at risk  You're considered to be at risk if you have a family history of AAA or a male between the age of 73-68 who smoking at least 100 cigarettes in your lifetime  2  Lung Cancer Screening: covers low dose CT scan once per year if you meet all of the following conditions: (1) Age 50-69; (2) No signs or symptoms of lung cancer; (3) Current smoker or have quit smoking within the last 15 years; (4) You have a tobacco smoking history of at least 30 pack years (packs per day x number of years you smoked); (5) You get a written order from a healthcare provider  3  Glaucoma Screening: covered annually if you're considered high risk: (1) You have diabetes OR (2) Family history of glaucoma OR (3)  aged 48 and older OR (3)  American aged 72 and older  3  Osteoporosis Screening: covered every 2 years if you meet one of the following conditions: (1) Have a vertebral abnormality; (2) On glucocorticoid therapy for more than 3 months; (3) Have primary hyperparathyroidism; (4) On osteoporosis medications and need to assess response to drug therapy  5  HIV Screening: covered annually if you're between the age of 12-76  Also covered annually if you are younger than 13 and older than 72 with risk factors for HIV infection  For pregnant patients, it is covered up to 3 times per pregnancy      Immunizations:  Immunization Recommendations   Influenza Vaccine Annual influenza vaccination during flu season is recommended for all persons aged >= 6 months who do not have contraindications   Pneumococcal Vaccine (Prevnar and Pneumovax)  * Prevnar = PCV13  * Pneumovax = PPSV23 Adults 25-60 years old: 1-3 doses may be recommended based on certain risk factors  Adults 72 years old: Prevnar (PCV13) vaccine recommended followed by Pneumovax (PPSV23) vaccine  If already received PPSV23 since turning 65, then PCV13 recommended at least one year after PPSV23 dose  Hepatitis B Vaccine 3 dose series if at intermediate or high risk (ex: diabetes, end stage renal disease, liver disease)   Tetanus (Td) Vaccine - COST NOT COVERED BY MEDICARE PART B Following completion of primary series, a booster dose should be given every 10 years to maintain immunity against tetanus  Td may also be given as tetanus wound prophylaxis  Tdap Vaccine - COST NOT COVERED BY MEDICARE PART B Recommended at least once for all adults  For pregnant patients, recommended with each pregnancy  Shingles Vaccine (Shingrix) - COST NOT COVERED BY MEDICARE PART B  2 shot series recommended in those aged 48 and above     Health Maintenance Due:      Topic Date Due    Hepatitis C Screening  1949    CRC Screening: Colonoscopy  04/10/2020 (Originally 1949)     Immunizations Due:      Topic Date Due    DTaP,Tdap,and Td Vaccines (1 - Tdap) 11/14/1961    Pneumococcal Vaccine: 65+ Years (1 of 2 - PCV13) 11/14/2015     Advance Directives   What are advance directives? Advance directives are legal documents that state your wishes and plans for medical care  These plans are made ahead of time in case you lose your ability to make decisions for yourself  Advance directives can apply to any medical decision, such as the treatments you want, and if you want to donate organs  What are the types of advance directives? There are many types of advance directives, and each state has rules about how to use them  You may choose a combination of any of the following:  · Living will: This is a written record of the treatment you want  You can also choose which treatments you do not want, which to limit, and which to stop at a certain time  This includes surgery, medicine, IV fluid, and tube feedings     · Durable power of  for healthcare [facility]): This is a written record that states who you want to make healthcare choices for you when you are unable to make them for yourself  This person, called a proxy, is usually a family member or a friend  You may choose more than 1 proxy  · Do not resuscitate (DNR) order:  A DNR order is used in case your heart stops beating or you stop breathing  It is a request not to have certain forms of treatment, such as CPR  A DNR order may be included in other types of advance directives  · Medical directive: This covers the care that you want if you are in a coma, near death, or unable to make decisions for yourself  You can list the treatments you want for each condition  Treatment may include pain medicine, surgery, blood transfusions, dialysis, IV or tube feedings, and a ventilator (breathing machine)  · Values history: This document has questions about your views, beliefs, and how you feel and think about life  This information can help others choose the care that you would choose  Why are advance directives important? An advance directive helps you control your care  Although spoken wishes may be used, it is better to have your wishes written down  Spoken wishes can be misunderstood, or not followed  Treatments may be given even if you do not want them  An advance directive may make it easier for your family to make difficult choices about your care  Cigarette Smoking and Your Health   Risks to your health if you smoke:  Nicotine and other chemicals found in tobacco damage every cell in your body  Even if you are a light smoker, you have an increased risk for cancer, heart disease, and lung disease  If you are pregnant or have diabetes, smoking increases your risk for complications  Benefits to your health if you stop smoking:   · You decrease respiratory symptoms such as coughing, wheezing, and shortness of breath     · You reduce your risk for cancers of the lung, mouth, throat, kidney, bladder, pancreas, stomach, and cervix  If you already have cancer, you increase the benefits of chemotherapy  You also reduce your risk for cancer returning or a second cancer from developing  · You reduce your risk for heart disease, blood clots, heart attack, and stroke  · You reduce your risk for lung infections, and diseases such as pneumonia, asthma, chronic bronchitis, and emphysema  · Your circulation improves  More oxygen can be delivered to your body  If you have diabetes, you lower your risk for complications, such as kidney, artery, and eye diseases  You also lower your risk for nerve damage  Nerve damage can lead to amputations, poor vision, and blindness  · You improve your body's ability to heal and to fight infections  For more information and support to stop smoking:   · [place]  gov  Phone: 4- 149 - 742-6131  Web Address: www [employer]  [address] [place], [place] [address]fuentes 2018 Information is for End User's use only and may not be sold, redistributed or otherwise used for commercial purposes   All illustrations and images included in CareNotes® are the copyrighted property of A D A M , Inc  or [address] [place] [address]

## 2020-03-20 ENCOUNTER — TELEPHONE (OUTPATIENT)
Dept: CARDIOLOGY | Age: 65
End: 2020-03-20

## 2020-05-13 DIAGNOSIS — I71.40 ABDOMINAL AORTIC ANEURYSM (AAA) WITHOUT RUPTURE (CMD): Primary | ICD-10-CM

## 2020-05-15 ENCOUNTER — HOSPITAL ENCOUNTER (OUTPATIENT)
Dept: CARDIOLOGY CLINIC | Facility: HOSPITAL | Age: 65
Discharge: HOME | End: 2020-05-15
Attending: NURSE PRACTITIONER
Payer: COMMERCIAL

## 2020-05-15 DIAGNOSIS — M15.9 PRIMARY OSTEOARTHRITIS INVOLVING MULTIPLE JOINTS: ICD-10-CM

## 2020-05-15 DIAGNOSIS — I71.4 ABDOMINAL AORTIC ANEURYSM (AAA) WITHOUT RUPTURE (HCC): ICD-10-CM

## 2020-05-15 PROCEDURE — 93978 VASCULAR STUDY: CPT | Performed by: INTERNAL MEDICINE

## 2020-05-15 NOTE — TELEPHONE ENCOUNTER
Please call pt to schedule HTN, cholesterol, CAD med check with Dr. Rafael Michael. Remind to complete fasting blood work before appt. LOV: 10/17/19 asked to return in 6 months    Thank you.

## 2020-05-18 ENCOUNTER — TELEPHONE (OUTPATIENT)
Dept: CARDIOLOGY | Age: 65
End: 2020-05-18

## 2020-05-18 DIAGNOSIS — I71.40 ABDOMINAL AORTIC ANEURYSM (AAA) WITHOUT RUPTURE (CMD): ICD-10-CM

## 2020-05-18 DIAGNOSIS — I71.40 ABDOMINAL AORTIC ANEURYSM (AAA) WITHOUT RUPTURE (CMD): Primary | ICD-10-CM

## 2020-05-18 PROCEDURE — X1094 US VASC ABDOMEN PELVIS VASCULAR DUPLEX STUDY COMPLETE: HCPCS | Performed by: INTERNAL MEDICINE

## 2020-06-08 RX ORDER — CELECOXIB 200 MG/1
CAPSULE ORAL
Qty: 90 CAPSULE | Refills: 1 | Status: SHIPPED | OUTPATIENT
Start: 2020-06-08 | End: 2020-12-03

## 2020-06-17 NOTE — H&P (VIEW-ONLY)
Jayne Gonzalez is a 62year old   male    Patient presents for complaints of pain and a bulge in the left inguinal region.   Previous right inguinal hernia repair    Past Medical History:   Diagnosis Date   • AAA (abdominal aortic aneurysm) ([address]    • Athe otherwise feels well, no weight loss, no fever or chills  SKIN: denies any unusual skin rashes or jaundice  HEENT: denies nasal congestion, sinus pain or sore throat; hearing loss negative  RESPIRATORY: denies shortness of breath, wheezing or cough   CARDI

## 2020-06-23 ENCOUNTER — OFFICE VISIT (OUTPATIENT)
Dept: FAMILY MEDICINE CLINIC | Facility: CLINIC | Age: 65
End: 2020-06-23
Payer: COMMERCIAL

## 2020-06-23 VITALS
TEMPERATURE: 99 F | BODY MASS INDEX: 27.44 KG/M2 | HEART RATE: 71 BPM | SYSTOLIC BLOOD PRESSURE: 122 MMHG | RESPIRATION RATE: 16 BRPM | WEIGHT: 196 LBS | DIASTOLIC BLOOD PRESSURE: 76 MMHG | OXYGEN SATURATION: 98 % | HEIGHT: 71 IN

## 2020-06-23 DIAGNOSIS — K40.90 LEFT INGUINAL HERNIA: ICD-10-CM

## 2020-06-23 DIAGNOSIS — E78.5 HYPERLIPIDEMIA, UNSPECIFIED HYPERLIPIDEMIA TYPE: ICD-10-CM

## 2020-06-23 DIAGNOSIS — I25.10 CORONARY ARTERY DISEASE INVOLVING NATIVE HEART WITHOUT ANGINA PECTORIS, UNSPECIFIED VESSEL OR LESION TYPE: ICD-10-CM

## 2020-06-23 DIAGNOSIS — I71.4 ABDOMINAL AORTIC ANEURYSM (AAA) WITHOUT RUPTURE (HCC): ICD-10-CM

## 2020-06-23 DIAGNOSIS — I10 ESSENTIAL HYPERTENSION, BENIGN: Primary | ICD-10-CM

## 2020-06-23 PROCEDURE — 99214 OFFICE O/P EST MOD 30 MIN: CPT | Performed by: FAMILY MEDICINE

## 2020-06-23 RX ORDER — METOPROLOL SUCCINATE 50 MG/1
TABLET, EXTENDED RELEASE ORAL
Qty: 180 TABLET | Refills: 1 | Status: SHIPPED | OUTPATIENT
Start: 2020-06-23 | End: 2020-12-10

## 2020-06-23 RX ORDER — ATORVASTATIN CALCIUM 40 MG/1
40 TABLET, FILM COATED ORAL
Qty: 90 TABLET | Refills: 1 | Status: SHIPPED | OUTPATIENT
Start: 2020-06-23 | End: 2020-12-10

## 2020-06-28 NOTE — PROGRESS NOTES
HPI:   Pt is here for follow-up of his CAD and hypercholesterolemia. He did have a CABG in 12/18. He had a LIMA to the LAD, saphenous vein graft to a diagonal and posterior descending. He has been doing well.    He denies chest pain or any unusual shortn capsule 1   • COLLAGEN OR Take by mouth. • Pantoprazole Sodium 40 MG Oral Tab EC TAKE 1 TABLET(40 MG) BY MOUTH EVERY DAY 90 tablet 0   • Saw Palmetto, Serenoa repens, (SAW PALMETTO OR) Take by mouth daily.      • Coenzyme Q10 (CO Q 10 OR) Take by mouth denies shortness of breath with exertion  CARDIOVASCULAR: denies chest pain on exertion  GI: denies abdominal pain and denies heartburn  NEURO: denies headaches    EXAM:   /76 (BP Location: Left arm, Patient Position: Sitting, Cuff Size: adult)   Pul

## 2020-07-03 ENCOUNTER — APPOINTMENT (OUTPATIENT)
Dept: LAB | Facility: HOSPITAL | Age: 65
End: 2020-07-03
Payer: COMMERCIAL

## 2020-07-03 DIAGNOSIS — K40.90 LEFT INGUINAL HERNIA: ICD-10-CM

## 2020-07-03 LAB
ATRIAL RATE: 63 BPM
P AXIS: 14 DEGREES
P-R INTERVAL: 170 MS
Q-T INTERVAL: 380 MS
QRS DURATION: 88 MS
QTC CALCULATION (BEZET): 388 MS
R AXIS: 2 DEGREES
T AXIS: 33 DEGREES
VENTRICULAR RATE: 63 BPM

## 2020-07-03 PROCEDURE — 93005 ELECTROCARDIOGRAM TRACING: CPT

## 2020-07-03 PROCEDURE — 93010 ELECTROCARDIOGRAM REPORT: CPT | Performed by: INTERNAL MEDICINE

## 2020-07-07 ENCOUNTER — LAB ENCOUNTER (OUTPATIENT)
Dept: LAB | Facility: HOSPITAL | Age: 65
End: 2020-07-07
Attending: SURGERY
Payer: COMMERCIAL

## 2020-07-07 DIAGNOSIS — K40.90 LEFT INGUINAL HERNIA: ICD-10-CM

## 2020-07-08 LAB — SARS-COV-2 RNA RESP QL NAA+PROBE: NOT DETECTED

## 2020-07-09 ENCOUNTER — ANESTHESIA EVENT (OUTPATIENT)
Dept: SURGERY | Facility: HOSPITAL | Age: 65
End: 2020-07-09
Payer: COMMERCIAL

## 2020-07-10 ENCOUNTER — ANESTHESIA (OUTPATIENT)
Dept: SURGERY | Facility: HOSPITAL | Age: 65
End: 2020-07-10
Payer: COMMERCIAL

## 2020-07-10 ENCOUNTER — HOSPITAL ENCOUNTER (OUTPATIENT)
Facility: HOSPITAL | Age: 65
Setting detail: HOSPITAL OUTPATIENT SURGERY
Discharge: HOME | End: 2020-07-10
Attending: SURGERY | Admitting: SURGERY
Payer: COMMERCIAL

## 2020-07-10 VITALS
RESPIRATION RATE: 16 BRPM | DIASTOLIC BLOOD PRESSURE: 73 MMHG | TEMPERATURE: 97 F | OXYGEN SATURATION: 98 % | SYSTOLIC BLOOD PRESSURE: 122 MMHG | HEIGHT: 71 IN | BODY MASS INDEX: 27.1 KG/M2 | WEIGHT: 193.56 LBS | HEART RATE: 63 BPM

## 2020-07-10 DIAGNOSIS — K40.90 LEFT INGUINAL HERNIA: Primary | ICD-10-CM

## 2020-07-10 PROCEDURE — 8E0W4CZ ROBOTIC ASSISTED PROCEDURE OF TRUNK REGION, PERCUTANEOUS ENDOSCOPIC APPROACH: ICD-10-PCS | Performed by: SURGERY

## 2020-07-10 PROCEDURE — 0YU64JZ SUPPLEMENT LEFT INGUINAL REGION WITH SYNTHETIC SUBSTITUTE, PERCUTANEOUS ENDOSCOPIC APPROACH: ICD-10-PCS | Performed by: SURGERY

## 2020-07-10 DEVICE — PROGRIP MESH: Type: IMPLANTABLE DEVICE | Site: INGUINAL | Status: FUNCTIONAL

## 2020-07-10 RX ORDER — LIDOCAINE HYDROCHLORIDE 10 MG/ML
INJECTION, SOLUTION EPIDURAL; INFILTRATION; INTRACAUDAL; PERINEURAL AS NEEDED
Status: DISCONTINUED | OUTPATIENT
Start: 2020-07-10 | End: 2020-07-10 | Stop reason: SURG

## 2020-07-10 RX ORDER — CEFAZOLIN SODIUM/WATER 2 G/20 ML
2 SYRINGE (ML) INTRAVENOUS ONCE
Status: COMPLETED | OUTPATIENT
Start: 2020-07-10 | End: 2020-07-10

## 2020-07-10 RX ORDER — ACETAMINOPHEN 500 MG
1000 TABLET ORAL ONCE
Status: DISCONTINUED | OUTPATIENT
Start: 2020-07-10 | End: 2020-07-10

## 2020-07-10 RX ORDER — HYDROMORPHONE HYDROCHLORIDE 1 MG/ML
0.4 INJECTION, SOLUTION INTRAMUSCULAR; INTRAVENOUS; SUBCUTANEOUS EVERY 5 MIN PRN
Status: DISCONTINUED | OUTPATIENT
Start: 2020-07-10 | End: 2020-07-10

## 2020-07-10 RX ORDER — MIDAZOLAM HYDROCHLORIDE 1 MG/ML
1 INJECTION INTRAMUSCULAR; INTRAVENOUS EVERY 5 MIN PRN
Status: DISCONTINUED | OUTPATIENT
Start: 2020-07-10 | End: 2020-07-10

## 2020-07-10 RX ORDER — BUPIVACAINE HYDROCHLORIDE 5 MG/ML
INJECTION, SOLUTION EPIDURAL; INTRACAUDAL AS NEEDED
Status: DISCONTINUED | OUTPATIENT
Start: 2020-07-10 | End: 2020-07-10 | Stop reason: HOSPADM

## 2020-07-10 RX ORDER — ONDANSETRON 2 MG/ML
INJECTION INTRAMUSCULAR; INTRAVENOUS AS NEEDED
Status: DISCONTINUED | OUTPATIENT
Start: 2020-07-10 | End: 2020-07-10 | Stop reason: SURG

## 2020-07-10 RX ORDER — KETOROLAC TROMETHAMINE 30 MG/ML
INJECTION, SOLUTION INTRAMUSCULAR; INTRAVENOUS AS NEEDED
Status: DISCONTINUED | OUTPATIENT
Start: 2020-07-10 | End: 2020-07-10 | Stop reason: SURG

## 2020-07-10 RX ORDER — DEXAMETHASONE SODIUM PHOSPHATE 4 MG/ML
VIAL (ML) INJECTION AS NEEDED
Status: DISCONTINUED | OUTPATIENT
Start: 2020-07-10 | End: 2020-07-10 | Stop reason: SURG

## 2020-07-10 RX ORDER — SODIUM CHLORIDE, SODIUM LACTATE, POTASSIUM CHLORIDE, CALCIUM CHLORIDE 600; 310; 30; 20 MG/100ML; MG/100ML; MG/100ML; MG/100ML
INJECTION, SOLUTION INTRAVENOUS CONTINUOUS
Status: DISCONTINUED | OUTPATIENT
Start: 2020-07-10 | End: 2020-07-10

## 2020-07-10 RX ORDER — ROCURONIUM BROMIDE 10 MG/ML
INJECTION, SOLUTION INTRAVENOUS AS NEEDED
Status: DISCONTINUED | OUTPATIENT
Start: 2020-07-10 | End: 2020-07-10 | Stop reason: SURG

## 2020-07-10 RX ORDER — HYDROCODONE BITARTRATE AND ACETAMINOPHEN 5; 325 MG/1; MG/1
1 TABLET ORAL AS NEEDED
Status: DISCONTINUED | OUTPATIENT
Start: 2020-07-10 | End: 2020-07-10

## 2020-07-10 RX ORDER — ACETAMINOPHEN 500 MG
1000 TABLET ORAL ONCE AS NEEDED
Status: DISCONTINUED | OUTPATIENT
Start: 2020-07-10 | End: 2020-07-10

## 2020-07-10 RX ORDER — LIDOCAINE HYDROCHLORIDE AND EPINEPHRINE 10; 10 MG/ML; UG/ML
INJECTION, SOLUTION INFILTRATION; PERINEURAL AS NEEDED
Status: DISCONTINUED | OUTPATIENT
Start: 2020-07-10 | End: 2020-07-10 | Stop reason: HOSPADM

## 2020-07-10 RX ORDER — NALOXONE HYDROCHLORIDE 0.4 MG/ML
80 INJECTION, SOLUTION INTRAMUSCULAR; INTRAVENOUS; SUBCUTANEOUS AS NEEDED
Status: DISCONTINUED | OUTPATIENT
Start: 2020-07-10 | End: 2020-07-10

## 2020-07-10 RX ORDER — MEPERIDINE HYDROCHLORIDE 25 MG/ML
12.5 INJECTION INTRAMUSCULAR; INTRAVENOUS; SUBCUTANEOUS AS NEEDED
Status: DISCONTINUED | OUTPATIENT
Start: 2020-07-10 | End: 2020-07-10

## 2020-07-10 RX ORDER — METOCLOPRAMIDE HYDROCHLORIDE 5 MG/ML
10 INJECTION INTRAMUSCULAR; INTRAVENOUS AS NEEDED
Status: DISCONTINUED | OUTPATIENT
Start: 2020-07-10 | End: 2020-07-10

## 2020-07-10 RX ORDER — PHENYLEPHRINE HCL 10 MG/ML
VIAL (ML) INJECTION AS NEEDED
Status: DISCONTINUED | OUTPATIENT
Start: 2020-07-10 | End: 2020-07-10 | Stop reason: SURG

## 2020-07-10 RX ORDER — ONDANSETRON 2 MG/ML
4 INJECTION INTRAMUSCULAR; INTRAVENOUS AS NEEDED
Status: DISCONTINUED | OUTPATIENT
Start: 2020-07-10 | End: 2020-07-10

## 2020-07-10 RX ORDER — CEFAZOLIN SODIUM/WATER 2 G/20 ML
SYRINGE (ML) INTRAVENOUS
Status: DISCONTINUED
Start: 2020-07-10 | End: 2020-07-10

## 2020-07-10 RX ORDER — HYDROCODONE BITARTRATE AND ACETAMINOPHEN 5; 325 MG/1; MG/1
2 TABLET ORAL AS NEEDED
Status: DISCONTINUED | OUTPATIENT
Start: 2020-07-10 | End: 2020-07-10

## 2020-07-10 RX ORDER — LABETALOL HYDROCHLORIDE 5 MG/ML
5 INJECTION, SOLUTION INTRAVENOUS EVERY 5 MIN PRN
Status: DISCONTINUED | OUTPATIENT
Start: 2020-07-10 | End: 2020-07-10

## 2020-07-10 RX ADMIN — ONDANSETRON 4 MG: 2 INJECTION INTRAMUSCULAR; INTRAVENOUS at 08:29:00

## 2020-07-10 RX ADMIN — SODIUM CHLORIDE, SODIUM LACTATE, POTASSIUM CHLORIDE, CALCIUM CHLORIDE: 600; 310; 30; 20 INJECTION, SOLUTION INTRAVENOUS at 08:43:00

## 2020-07-10 RX ADMIN — CEFAZOLIN SODIUM/WATER 2 G: 2 G/20 ML SYRINGE (ML) INTRAVENOUS at 07:41:00

## 2020-07-10 RX ADMIN — ROCURONIUM BROMIDE 50 MG: 10 INJECTION, SOLUTION INTRAVENOUS at 07:38:00

## 2020-07-10 RX ADMIN — PHENYLEPHRINE HCL 100 MCG: 10 MG/ML VIAL (ML) INJECTION at 07:45:00

## 2020-07-10 RX ADMIN — KETOROLAC TROMETHAMINE 30 MG: 30 INJECTION, SOLUTION INTRAMUSCULAR; INTRAVENOUS at 08:29:00

## 2020-07-10 RX ADMIN — LIDOCAINE HYDROCHLORIDE 50 MG: 10 INJECTION, SOLUTION EPIDURAL; INFILTRATION; INTRACAUDAL; PERINEURAL at 07:38:00

## 2020-07-10 RX ADMIN — DEXAMETHASONE SODIUM PHOSPHATE 8 MG: 4 MG/ML VIAL (ML) INJECTION at 07:41:00

## 2020-07-10 RX ADMIN — ROCURONIUM BROMIDE 15 MG: 10 INJECTION, SOLUTION INTRAVENOUS at 08:25:00

## 2020-07-10 NOTE — OPERATIVE REPORT
[facility]    PATIENT'S NAME: Yudith Gomez   ATTENDING PHYSICIAN: Shanthi Rogers M.D. OPERATING PHYSICIAN: Shanthi Rogers M.D.    PATIENT ACCOUNT#:   [de-identified]    LOCATION:  [address] [place] [address] 10  MEDICAL RECORD #:   [id number] in layers using absorbable sutures. Steri-Strips, sterile dressing were provided. The patient tolerated the procedure well. He was taken to recovery room for observation.     Dictated By Anneliese George M.D.  d: 07/10/2020 08:33:48  t: 07/10/2020 14:09:

## 2020-07-10 NOTE — ANESTHESIA PROCEDURE NOTES
Airway  Date/Time: 7/10/2020 7:40 AM  Urgency: elective    Airway not difficult    General Information and Staff    Patient location during procedure: OR  Anesthesiologist: Lakeisha Keller MD  Performed: anesthesiologist     Indications and Patient Condi

## 2020-07-10 NOTE — INTERVAL H&P NOTE
Pre-op Diagnosis: Left inguinal hernia [K40.90]    The above referenced H&P was reviewed by Tessa Rascon MD on 7/10/2020, the patient was examined and no significant changes have occurred in the patient's condition since the H&P was performed.   I discuss

## 2020-07-10 NOTE — ANESTHESIA POSTPROCEDURE EVALUATION
[facility] Status:  H[facility]   Age/Gender 60year old male MRN [id number]   [facility] SURGERY Attending Erik Snyder, [address] [facility] Se Day # 0 PCP Lidia Robles MD       Anesthesia Post-op No

## 2020-07-10 NOTE — BRIEF OP NOTE
Pre-Operative Diagnosis: Left inguinal hernia [K40.90]     Post-Operative Diagnosis: Left inguinal hernia [K40.90]      Procedure Performed:   Procedure(s):  XI ROBOT ASSISTED REPAIR LEFT INGUINAL HERNIA WITH MESH    Surgeon(s) and Role:     * Stephania Hamman

## 2020-07-10 NOTE — ANESTHESIA PREPROCEDURE EVALUATION
PRE-OP EVALUATION    Patient Name: Jose Diaz    Pre-op Diagnosis: Left inguinal hernia [K40.90]    Procedure(s):  XI ROBOT ASSISTED REPAIR LEFT INGUINAL HERNIA WITH MESH    Surgeon(s) and Role:     * Erik Snyder MD - Primary    Pre-op vitals Mardeen Lis graft) Constipation          Past Surgical History:   Procedure Laterality Date   • ANGIOGRAM     • BYPASS SURGERY     • CABG  12/17/2018    TRIPLE BYPASS   • CARDIAC CATHETERIZATION  12/07/2018    DR NEFF   • CATH ANGIO  12/07/2018    DR. NEFF   • CO signed without further questions.      Present on Admission:  **None**

## 2020-09-30 PROBLEM — M75.121 NONTRAUMATIC COMPLETE TEAR OF RIGHT ROTATOR CUFF: Status: ACTIVE | Noted: 2020-09-30

## 2020-10-07 ENCOUNTER — HOSPITAL ENCOUNTER (OUTPATIENT)
Age: 65
Discharge: HOME | End: 2020-10-07
Payer: COMMERCIAL

## 2020-10-07 ENCOUNTER — TELEPHONE (OUTPATIENT)
Dept: FAMILY MEDICINE CLINIC | Facility: CLINIC | Age: 65
End: 2020-10-07

## 2020-10-07 VITALS
OXYGEN SATURATION: 98 % | DIASTOLIC BLOOD PRESSURE: 84 MMHG | SYSTOLIC BLOOD PRESSURE: 134 MMHG | RESPIRATION RATE: 16 BRPM | TEMPERATURE: 99 F | HEART RATE: 81 BPM

## 2020-10-07 DIAGNOSIS — Z20.822 FEVER WITH EXPOSURE TO COVID-19 VIRUS: Primary | ICD-10-CM

## 2020-10-07 DIAGNOSIS — R50.9 FEVER WITH EXPOSURE TO COVID-19 VIRUS: Primary | ICD-10-CM

## 2020-10-07 PROCEDURE — 99203 OFFICE O/P NEW LOW 30 MIN: CPT | Performed by: NURSE PRACTITIONER

## 2020-10-07 PROCEDURE — 99213 OFFICE O/P EST LOW 20 MIN: CPT | Performed by: NURSE PRACTITIONER

## 2020-10-07 NOTE — ED INITIAL ASSESSMENT (HPI)
Chills- started last night temp 100.5. pt states his daughter  Came over the weekend and she works in a nursing home and received her result today and tested Positive.    Headache- started yesterday

## 2020-10-07 NOTE — ED NOTES
Nasal swab on both nares performed without incident . Pt tolerated procedure well. RN with full PPE  mask, gown and goggles.

## 2020-10-07 NOTE — TELEPHONE ENCOUNTER
Patient gives a history of 24 hours of low-grade fever of . Having a headache and body aches. Found out today that his daughter tested positive for COVID-19. They were together this past weekend.   He was advised to go to urgent care for evaluation

## 2020-10-08 ENCOUNTER — TELEPHONE (OUTPATIENT)
Dept: FAMILY MEDICINE CLINIC | Facility: CLINIC | Age: 65
End: 2020-10-08

## 2020-10-08 NOTE — TELEPHONE ENCOUNTER
S/w pt for condition update. covid test is still in process from urgent care. Pt reports no temp today. Reports h/a is better today than yesterday. No sob. Still has body aches-no better, no worse. Reports occasional cough but it is dry.   No worseni

## 2020-10-12 NOTE — TELEPHONE ENCOUNTER
• How are you feeling? \" little achy  and fatigued. Has headache above the eyes. \"   • Running any fever? No fever   • Any continuing cough? No cough   • Any SOB or trouble breathing (with exertion or movement)? No  • Do you feel you are improving?  Pt stat

## 2020-10-14 NOTE — TELEPHONE ENCOUNTER
• How are you feeling?still feeling fatigued, decreased appetite, has taste and smell. • Running any fever? 98.6 today  • Any continuing cough? no  • Any SOB or trouble breathing (with exertion or movement)? NO  • Do you feel you are improving?  yes  • Are

## 2020-10-19 NOTE — TELEPHONE ENCOUNTER
• How are you feeling?'feels OK \"   • Running any fever? \" No:\"   • Any continuing cough? \" No'\"  • Any SOB or trouble breathing (with exertion or movement)? \" NO\"   • Do you feel you are improving? \" still a little tired. \"   • Are you self-isol

## 2020-11-06 ENCOUNTER — HOSPITAL ENCOUNTER (OUTPATIENT)
Dept: CARDIOLOGY CLINIC | Facility: HOSPITAL | Age: 65
Discharge: HOME | End: 2020-11-06
Attending: INTERNAL MEDICINE
Payer: COMMERCIAL

## 2020-11-06 DIAGNOSIS — I71.4 ABDOMINAL AORTIC ANEURYSM (AAA) WITHOUT RUPTURE (HCC): ICD-10-CM

## 2020-11-06 PROCEDURE — 93306 TTE W/DOPPLER COMPLETE: CPT | Performed by: INTERNAL MEDICINE

## 2020-11-07 ENCOUNTER — HOSPITAL ENCOUNTER (OUTPATIENT)
Dept: MRI IMAGING | Facility: HOSPITAL | Age: 65
Discharge: HOME | End: 2020-11-07
Attending: ORTHOPAEDIC SURGERY
Payer: COMMERCIAL

## 2020-11-07 DIAGNOSIS — M75.121 NONTRAUMATIC COMPLETE TEAR OF RIGHT ROTATOR CUFF: ICD-10-CM

## 2020-11-07 PROCEDURE — 73221 MRI JOINT UPR EXTREM W/O DYE: CPT | Performed by: ORTHOPAEDIC SURGERY

## 2020-11-09 ENCOUNTER — TELEPHONE (OUTPATIENT)
Dept: CARDIOLOGY | Age: 65
End: 2020-11-09

## 2020-11-09 DIAGNOSIS — I71.40 ABDOMINAL AORTIC ANEURYSM (AAA) WITHOUT RUPTURE (CMD): ICD-10-CM

## 2020-11-09 PROCEDURE — X1094 US VASC ABDOMEN PELVIS VASCULAR DUPLEX STUDY COMPLETE: HCPCS | Performed by: INTERNAL MEDICINE

## 2020-11-17 ENCOUNTER — ORDER TRANSCRIPTION (OUTPATIENT)
Dept: PHYSICAL THERAPY | Age: 65
End: 2020-11-17

## 2020-11-17 DIAGNOSIS — M75.121 NONTRAUMATIC COMPLETE TEAR OF RIGHT ROTATOR CUFF: Primary | ICD-10-CM

## 2020-11-18 ENCOUNTER — TELEPHONE (OUTPATIENT)
Dept: FAMILY MEDICINE CLINIC | Facility: CLINIC | Age: 65
End: 2020-11-18

## 2020-11-18 ENCOUNTER — TELEPHONE (OUTPATIENT)
Dept: PHYSICAL THERAPY | Age: 65
End: 2020-11-18

## 2020-11-18 DIAGNOSIS — N40.1 BENIGN PROSTATIC HYPERPLASIA WITH NOCTURIA: ICD-10-CM

## 2020-11-18 DIAGNOSIS — R35.1 BENIGN PROSTATIC HYPERPLASIA WITH NOCTURIA: ICD-10-CM

## 2020-11-18 DIAGNOSIS — I25.10 CORONARY ARTERY DISEASE INVOLVING NATIVE HEART WITHOUT ANGINA PECTORIS, UNSPECIFIED VESSEL OR LESION TYPE: ICD-10-CM

## 2020-11-18 DIAGNOSIS — E78.5 HYPERLIPIDEMIA, UNSPECIFIED HYPERLIPIDEMIA TYPE: ICD-10-CM

## 2020-11-18 DIAGNOSIS — I10 ESSENTIAL HYPERTENSION, BENIGN: Primary | ICD-10-CM

## 2020-11-18 DIAGNOSIS — Z01.84 IMMUNITY STATUS TESTING: ICD-10-CM

## 2020-11-19 ENCOUNTER — OFFICE VISIT (OUTPATIENT)
Dept: PHYSICAL THERAPY | Age: 65
End: 2020-11-19
Attending: ORTHOPAEDIC SURGERY
Payer: COMMERCIAL

## 2020-11-19 DIAGNOSIS — M75.121 NONTRAUMATIC COMPLETE TEAR OF RIGHT ROTATOR CUFF: ICD-10-CM

## 2020-11-19 PROCEDURE — 97110 THERAPEUTIC EXERCISES: CPT

## 2020-11-19 PROCEDURE — 97161 PT EVAL LOW COMPLEX 20 MIN: CPT

## 2020-11-19 NOTE — PROGRESS NOTES
SHOULDER EVALUATION:   Referring Physician: Dr. Tom Sanabria  Diagnosis: R RTC tear with shoulder pain     Date of Service: 11/19/2020     PATIENT SUMMARY   Zachery Salazar is a 60year old male who presents to therapy today with complaints of R shoulder pa understanding and performs HEP correctly without reported pain. Skilled Physical Therapy is medically necessary to address the above impairments and reach functional goals. Precautions: AAA, CABG history. OBJECTIVE:   Observation/Posture:   Forward jerrica improve ability to don deodorant, don/doff shirts, and wash hair   · Pt will increase shoulder AROM ER to  70 to be able to reach and fasten seatbelt   · Pt will increase shoulder functional int rot reach from  L2 to at least T 10  to be able to reach in b

## 2020-11-24 ENCOUNTER — OFFICE VISIT (OUTPATIENT)
Dept: PHYSICAL THERAPY | Age: 65
End: 2020-11-24
Attending: ORTHOPAEDIC SURGERY
Payer: COMMERCIAL

## 2020-11-24 PROCEDURE — 97110 THERAPEUTIC EXERCISES: CPT

## 2020-11-24 PROCEDURE — 97140 MANUAL THERAPY 1/> REGIONS: CPT

## 2020-11-24 NOTE — PROGRESS NOTES
Dx:  R RTC tear     Insurance (Authorized # of Visits):  8         Authorizing Physician: Dr. Kasey Combs MD visit: none scheduled  Fall Risk: standard         Precautions: n/a             Subjective: R shoulder impingement pain at end ranges persists 2x10      Charges: velasquez ex2       Total Timed Treatment: 40 min  Total Treatment Time:  40 min

## 2020-12-03 ENCOUNTER — OFFICE VISIT (OUTPATIENT)
Dept: PHYSICAL THERAPY | Age: 65
End: 2020-12-03
Attending: ORTHOPAEDIC SURGERY
Payer: COMMERCIAL

## 2020-12-03 DIAGNOSIS — M15.9 PRIMARY OSTEOARTHRITIS INVOLVING MULTIPLE JOINTS: ICD-10-CM

## 2020-12-03 PROCEDURE — 97140 MANUAL THERAPY 1/> REGIONS: CPT

## 2020-12-03 PROCEDURE — 97110 THERAPEUTIC EXERCISES: CPT

## 2020-12-03 RX ORDER — CELECOXIB 200 MG/1
CAPSULE ORAL
Qty: 90 CAPSULE | Refills: 1 | Status: ON HOLD | OUTPATIENT
Start: 2020-12-03 | End: 2021-06-26

## 2020-12-03 NOTE — PROGRESS NOTES
appt 12. 10Dx: R RTC tear     Insurance (Authorized # of Visits):  8         Authorizing Physician: Dr. Kimberly Dejesus  Next MD visit: none scheduled  Fall Risk: standard         Precautions: n/a             Subjective: R shoulder impingement pain at end range stretches 30 sec x3      scifit 5 mins  scifit 5 mins       R shoulder flexion with wand AAROM  x20  Alt shoulder flex at wall x10 R/L ,   Ball for assisted abd       Manual stretch into end range flex, IR, as luca       Pulleys: IR 5# x20   ER 2.5 x20

## 2020-12-08 ENCOUNTER — OFFICE VISIT (OUTPATIENT)
Dept: PHYSICAL THERAPY | Age: 65
End: 2020-12-08
Attending: ORTHOPAEDIC SURGERY
Payer: COMMERCIAL

## 2020-12-08 PROCEDURE — 97110 THERAPEUTIC EXERCISES: CPT

## 2020-12-08 PROCEDURE — 97140 MANUAL THERAPY 1/> REGIONS: CPT

## 2020-12-08 NOTE — PROGRESS NOTES
0Dx: R RTC tear     Insurance (Authorized # of Visits):  8         Authorizing Physician: Dr. Guy Escobar  Next MD visit: none scheduled  Fall Risk: standard         Precautions: n/a             Subjective: Still has trouble at night.  Was able to golf with HBB  -scapular mobs  Posterior and inf capsule stretches 30 sec x3    Inf glide to improve R HBB  -scapular mobs  Posterior and inf capsule stretches   Gr III  5 mins   -SL ER with cane   - wand for assisted extension     scifit 5 mins  scifit 5 mins  Scif

## 2020-12-10 ENCOUNTER — OFFICE VISIT (OUTPATIENT)
Dept: FAMILY MEDICINE CLINIC | Facility: CLINIC | Age: 65
End: 2020-12-10
Payer: COMMERCIAL

## 2020-12-10 ENCOUNTER — OFFICE VISIT (OUTPATIENT)
Dept: PHYSICAL THERAPY | Age: 65
End: 2020-12-10
Attending: ORTHOPAEDIC SURGERY
Payer: COMMERCIAL

## 2020-12-10 VITALS
WEIGHT: 196 LBS | RESPIRATION RATE: 16 BRPM | HEART RATE: 64 BPM | HEIGHT: 71 IN | DIASTOLIC BLOOD PRESSURE: 68 MMHG | BODY MASS INDEX: 27.44 KG/M2 | SYSTOLIC BLOOD PRESSURE: 126 MMHG | TEMPERATURE: 98 F

## 2020-12-10 DIAGNOSIS — I25.10 CORONARY ARTERY DISEASE INVOLVING NATIVE HEART WITHOUT ANGINA PECTORIS, UNSPECIFIED VESSEL OR LESION TYPE: ICD-10-CM

## 2020-12-10 DIAGNOSIS — Z01.84 IMMUNITY STATUS TESTING: ICD-10-CM

## 2020-12-10 DIAGNOSIS — I71.4 ABDOMINAL AORTIC ANEURYSM (AAA) WITHOUT RUPTURE (HCC): ICD-10-CM

## 2020-12-10 DIAGNOSIS — I10 ESSENTIAL HYPERTENSION, BENIGN: Primary | ICD-10-CM

## 2020-12-10 DIAGNOSIS — E78.5 HYPERLIPIDEMIA, UNSPECIFIED HYPERLIPIDEMIA TYPE: ICD-10-CM

## 2020-12-10 PROCEDURE — 3008F BODY MASS INDEX DOCD: CPT | Performed by: FAMILY MEDICINE

## 2020-12-10 PROCEDURE — 99214 OFFICE O/P EST MOD 30 MIN: CPT | Performed by: FAMILY MEDICINE

## 2020-12-10 PROCEDURE — 3074F SYST BP LT 130 MM HG: CPT | Performed by: FAMILY MEDICINE

## 2020-12-10 PROCEDURE — 97110 THERAPEUTIC EXERCISES: CPT

## 2020-12-10 PROCEDURE — 3078F DIAST BP <80 MM HG: CPT | Performed by: FAMILY MEDICINE

## 2020-12-10 PROCEDURE — 97140 MANUAL THERAPY 1/> REGIONS: CPT

## 2020-12-10 RX ORDER — METOPROLOL SUCCINATE 50 MG/1
TABLET, EXTENDED RELEASE ORAL
Qty: 180 TABLET | Refills: 1 | Status: SHIPPED | OUTPATIENT
Start: 2020-12-10 | End: 2021-07-14

## 2020-12-10 RX ORDER — ATORVASTATIN CALCIUM 40 MG/1
40 TABLET, FILM COATED ORAL
Qty: 90 TABLET | Refills: 1 | Status: SHIPPED | OUTPATIENT
Start: 2020-12-10 | End: 2021-07-14

## 2020-12-10 NOTE — PROGRESS NOTES
HPI:   Pt is here for follow-up of his CAD and hypercholesterolemia. He did have a CABG in 12/18. He had a LIMA to the LAD, saphenous vein graft to a diagonal and posterior descending. He has been doing well.    He denies chest pain or any unusual shortn TWICE DAILY 180 tablet 1   • CELECOXIB 200 MG Oral Cap TAKE 1 CAPSULE(200 MG) BY MOUTH DAILY 90 capsule 1   • Guarana, Paullinia cupana, (GUARANA OR) Take by mouth. • Ascorbic Acid (VITAMIN C OR) Take by mouth daily.      • Pantoprazole Sodium 40 MG Ora History:   Social History    Tobacco Use      Smoking status: Former Smoker      Smokeless tobacco: Never Used      Tobacco comment: QUIT 30 YRS AGO    Alcohol use:  Yes      Alcohol/week: 5.0 standard drinks      Types: 5 Cans of beer per week      [facility] (40 mg total) by mouth once daily.    • Metoprolol Succinate ER 50 MG Oral Tablet 24 Hr 180 tablet 1     Sig: TAKE 1 TABLET(50 MG) BY MOUTH TWICE DAILY     Imaging & Consults:  None      See in 6 months

## 2020-12-10 NOTE — PROGRESS NOTES
0Dx: R RTC tear     Insurance (Authorized # of Visits):  8         Authorizing Physician: Dr. Lonnie Corley  Next MD visit: none scheduled  Fall Risk: standard         Precautions: n/a             Subjective:  Felt fine after last appt.   Still has trouble at HBB  -scapular mobs  Posterior and inf capsule stretches 30 sec x3 Manual therapy : posterior mobs: Gr III  MWM: to incr R shoulder flexion   -   Inf glide to improve R HBB  -scapular mobs  Posterior and inf capsule stretches 30 sec x3    Inf glide to impr

## 2020-12-15 ENCOUNTER — APPOINTMENT (OUTPATIENT)
Dept: PHYSICAL THERAPY | Age: 65
End: 2020-12-15
Attending: ORTHOPAEDIC SURGERY
Payer: COMMERCIAL

## 2020-12-17 ENCOUNTER — OFFICE VISIT (OUTPATIENT)
Dept: PHYSICAL THERAPY | Age: 65
End: 2020-12-17
Attending: ORTHOPAEDIC SURGERY
Payer: COMMERCIAL

## 2020-12-17 PROCEDURE — 97110 THERAPEUTIC EXERCISES: CPT

## 2020-12-17 PROCEDURE — 97140 MANUAL THERAPY 1/> REGIONS: CPT

## 2020-12-17 NOTE — PROGRESS NOTES
0Dx: R RTC tear     Insurance (Authorized # of Visits):  8         Authorizing Physician: Dr. Daphne Gillespie  Next MD visit: none scheduled  Fall Risk: standard         Precautions: n/a             Subjective:  Able to sleep more easily on R side.   No recent incr R shoulder flexion   -   Inf glide to improve R HBB  -scapular mobs  Posterior and inf capsule stretches 30 sec x3 Manual therapy : posterior mobs: Gr III  MWM: to incr R shoulder flexion   -   Inf glide to improve R HBB  -scapular mobs  Posterior and 12/17/2020  Sleeper stretch     Charges: m, ex2       Total Timed Treatment: 40 min  Total Treatment Time:  40 min

## 2020-12-22 ENCOUNTER — OFFICE VISIT (OUTPATIENT)
Dept: PHYSICAL THERAPY | Age: 65
End: 2020-12-22
Attending: ORTHOPAEDIC SURGERY
Payer: COMMERCIAL

## 2020-12-22 PROCEDURE — 97140 MANUAL THERAPY 1/> REGIONS: CPT

## 2020-12-22 PROCEDURE — 97110 THERAPEUTIC EXERCISES: CPT

## 2020-12-22 PROCEDURE — 97112 NEUROMUSCULAR REEDUCATION: CPT

## 2020-12-22 NOTE — PROGRESS NOTES
0Dx: R RTC tear     Insurance (Authorized # of Visits):  8         Authorizing Physician: Dr. Zurita Click  Next MD visit: none scheduled  Fall Risk: standard         Precautions: n/a             Subjective:  Feels 90% recovered. No appt yet with ortho.  Abl 12/17/2020  Tx#: 6/9 12/22/2020 7/9   Manual therapy : posterior mobs: Gr III  MWM: to incr R shoulder flexion   -   Inf glide to improve R HBB  -scapular mobs  Posterior and inf capsule stretches 30 sec x3 Manual therapy : posterior mobs: Gr III  MWM: to rot  Verbal review Cued for scapular positioning during therex Rhythmic stab at 90 deg flex: 2 mins  Rhythmic stab at 90 deg flex: 2 mins  Bicep curls 7# x20    SL abd and SL ER 2# x20   SL abd and SL ER 2# x20    3# SL ER 3#  3 x10  abd 3# 3x10 Verbal rev

## 2021-01-05 ENCOUNTER — OFFICE VISIT (OUTPATIENT)
Dept: PHYSICAL THERAPY | Age: 66
End: 2021-01-05
Attending: ORTHOPAEDIC SURGERY
Payer: COMMERCIAL

## 2021-01-05 PROCEDURE — 97110 THERAPEUTIC EXERCISES: CPT

## 2021-01-05 PROCEDURE — 97112 NEUROMUSCULAR REEDUCATION: CPT

## 2021-01-05 PROCEDURE — 97140 MANUAL THERAPY 1/> REGIONS: CPT

## 2021-01-05 NOTE — PROGRESS NOTES
0Dx: R RTC tear     Insurance (Authorized # of Visits):  8         Authorizing Physician: Dr. George Coronel  Next MD visit: none scheduled  Fall Risk: standard         Precautions: n/a             Subjective: Had trouble sleeping Sun. Night.   Some pain with III  MWM: to incr R shoulder flexion   -   Inf glide to improve R HBB  -scapular mobs  Posterior and inf capsule stretches 30 sec x3    Inf glide to improve R HBB  -scapular mobs  Posterior and inf capsule stretches   Gr III  5 mins   -SL ER with cane   - reviewed foam roll ex, standing abd/ flex, bicep curls    Verbal review Cued for scapular positioning during therex Rhythmic stab at 90 deg flex: 2 mins  Rhythmic stab at 90 deg flex: 2 mins  Bicep curls 7# x20  Bicep curls 7# x20    SL abd and SL ER 2# x2

## 2021-01-07 ENCOUNTER — OFFICE VISIT (OUTPATIENT)
Dept: PHYSICAL THERAPY | Age: 66
End: 2021-01-07
Attending: ORTHOPAEDIC SURGERY
Payer: COMMERCIAL

## 2021-01-07 PROCEDURE — 97140 MANUAL THERAPY 1/> REGIONS: CPT

## 2021-01-07 PROCEDURE — 97110 THERAPEUTIC EXERCISES: CPT

## 2021-01-07 PROCEDURE — 97112 NEUROMUSCULAR REEDUCATION: CPT

## 2021-01-07 NOTE — PROGRESS NOTES
0Dx: R RTC tear     Insurance (Authorized # of Visits):  8         Authorizing Physician: Dr. Fair[place] Dry  Next MD visit: none scheduled  Fall Risk: standard         Precautions: n/a             Subjective: Feeling better.  Thinks his flare up Sun may have 5/9 Date: 12/17/2020  Tx#: 6/9 12/22/2020  7/9 1/5/2021  8/10 jerald 1/7/2021  9/10   Manual therapy : posterior mobs: Gr III  MWM: to incr R shoulder flexion   -   Inf glide to improve R HBB  -scapular mobs  Posterior and inf capsule stretches 30 sec x3    I glides Gr III-IV,   HBB mob: inferior glide  Scapular mobs  10 mins Manual stretch into end range flex, IR, ER, as luca:  Sustained stretch into shoulder IR  30 sec x6   - manual: posterior and inf glides Gr III-IV,   HBB mob: inferior glide  Scapular mobs

## 2021-01-21 ENCOUNTER — OFFICE VISIT (OUTPATIENT)
Dept: PHYSICAL THERAPY | Age: 66
End: 2021-01-21
Attending: FAMILY MEDICINE
Payer: COMMERCIAL

## 2021-01-21 PROCEDURE — 97112 NEUROMUSCULAR REEDUCATION: CPT

## 2021-01-21 PROCEDURE — 97110 THERAPEUTIC EXERCISES: CPT

## 2021-01-21 PROCEDURE — 97140 MANUAL THERAPY 1/> REGIONS: CPT

## 2021-01-21 NOTE — PROGRESS NOTES
0Dx: R RTC tear  ( complete RTC tear)     Insurance (Authorized # of Visits):  8         Authorizing Physician: Dr. Gerber Aguilera  Next MD visit:  Not scheduled.    Fall Risk: standard         Precautions: cardiac history         Discharge Summary   [place] [employer] improve shoulder strength throughout to 4+/5 to improve function with reaching and lowering items from overhead. · Pt will be independent and compliant with comprehensive HEP to maintain progress achieved in PT          Plan:Discharge PT.  Patient to con stretch into end range flex, IR, ER, as luca  30 sec x6   - manual: posterior and inf glides Gr III-IV,   Scapular mobs  10 mins Manual stretch into end range flex, IR, ER, as luca:  Sustained stretch into shoulder IR  30 sec x6   - manual: posterior and inf

## 2021-04-09 ENCOUNTER — TELEPHONE (OUTPATIENT)
Dept: CARDIOLOGY | Age: 66
End: 2021-04-09

## 2021-04-09 DIAGNOSIS — I71.40 ABDOMINAL AORTIC ANEURYSM (AAA) WITHOUT RUPTURE (CMD): Primary | ICD-10-CM

## 2021-04-23 ENCOUNTER — HOSPITAL ENCOUNTER (OUTPATIENT)
Dept: CARDIOLOGY CLINIC | Facility: HOSPITAL | Age: 66
Discharge: HOME | End: 2021-04-23
Attending: INTERNAL MEDICINE
Payer: COMMERCIAL

## 2021-04-23 ENCOUNTER — HOSPITAL ENCOUNTER (OUTPATIENT)
Dept: CARDIOLOGY CLINIC | Facility: HOSPITAL | Age: 66
End: 2021-04-23
Attending: INTERNAL MEDICINE
Payer: COMMERCIAL

## 2021-04-23 DIAGNOSIS — I71.4 AAA (ABDOMINAL AORTIC ANEURYSM) WITHOUT RUPTURE (HCC): ICD-10-CM

## 2021-04-23 PROCEDURE — 93978 VASCULAR STUDY: CPT | Performed by: INTERNAL MEDICINE

## 2021-04-23 PROCEDURE — X1094 NO CHARGE VISIT: HCPCS | Performed by: INTERNAL MEDICINE

## 2021-04-27 DIAGNOSIS — I71.40 ABDOMINAL AORTIC ANEURYSM (AAA) WITHOUT RUPTURE (CMD): ICD-10-CM

## 2021-06-03 ENCOUNTER — HOSPITAL ENCOUNTER (OUTPATIENT)
Dept: CT IMAGING | Facility: HOSPITAL | Age: 66
Discharge: HOME | End: 2021-06-03
Attending: SURGERY
Payer: COMMERCIAL

## 2021-06-03 DIAGNOSIS — I71.4 AAA (ABDOMINAL AORTIC ANEURYSM) (HCC): ICD-10-CM

## 2021-06-03 PROCEDURE — 74174 CTA ABD&PLVS W/CONTRAST: CPT | Performed by: SURGERY

## 2021-06-22 ENCOUNTER — EKG ENCOUNTER (OUTPATIENT)
Dept: LAB | Facility: HOSPITAL | Age: 66
End: 2021-06-22
Attending: SURGERY
Payer: COMMERCIAL

## 2021-06-22 ENCOUNTER — LAB ENCOUNTER (OUTPATIENT)
Dept: LAB | Facility: HOSPITAL | Age: 66
End: 2021-06-22
Attending: SURGERY
Payer: COMMERCIAL

## 2021-06-22 DIAGNOSIS — Z91.89 AT HIGH RISK FOR BLEEDING: ICD-10-CM

## 2021-06-22 DIAGNOSIS — I71.4 AAA (ABDOMINAL AORTIC ANEURYSM) WITHOUT RUPTURE (HCC): ICD-10-CM

## 2021-06-22 DIAGNOSIS — Z01.818 PREOP TESTING: ICD-10-CM

## 2021-06-22 PROCEDURE — 86900 BLOOD TYPING SEROLOGIC ABO: CPT

## 2021-06-22 PROCEDURE — 36415 COLL VENOUS BLD VENIPUNCTURE: CPT

## 2021-06-22 PROCEDURE — 93005 ELECTROCARDIOGRAM TRACING: CPT

## 2021-06-22 PROCEDURE — 93010 ELECTROCARDIOGRAM REPORT: CPT | Performed by: INTERNAL MEDICINE

## 2021-06-22 PROCEDURE — 86850 RBC ANTIBODY SCREEN: CPT

## 2021-06-22 PROCEDURE — 85610 PROTHROMBIN TIME: CPT

## 2021-06-22 PROCEDURE — 85025 COMPLETE CBC W/AUTO DIFF WBC: CPT

## 2021-06-22 PROCEDURE — 85730 THROMBOPLASTIN TIME PARTIAL: CPT

## 2021-06-22 PROCEDURE — 86901 BLOOD TYPING SEROLOGIC RH(D): CPT

## 2021-06-24 ENCOUNTER — ANESTHESIA EVENT (OUTPATIENT)
Dept: CARDIAC SURGERY | Facility: HOSPITAL | Age: 66
DRG: 269 | End: 2021-06-24
Payer: COMMERCIAL

## 2021-06-24 NOTE — H&P
[address]    PATIENT'S NAME: Kaylie Burleson   ATTENDING PHYSICIAN: Jordana Walden M.D.    PATIENT ACCOUNT#:   [de-identified]    LOCATION:    MEDICAL RECORD #:   [id number]       YOB: 1955  ADMISSION DATE:       06/25/2021    HISTORY AND P appropriate. He is in no acute distress. VITAL SIGNS:  His blood pressure was 138/74, heart rate 72, respirations 20. HEENT:  Pupils equal, round. Sclerae clear. Mouth no lesions. NECK:  No cervical bruit. No JVD. LUNGS:  Clear to auscultation.   HE

## 2021-06-25 ENCOUNTER — HOSPITAL ENCOUNTER (INPATIENT)
Facility: HOSPITAL | Age: 66
LOS: 1 days | Discharge: HOME | DRG: 269 | End: 2021-06-26
Attending: SURGERY | Admitting: SURGERY
Payer: COMMERCIAL

## 2021-06-25 ENCOUNTER — ANESTHESIA (OUTPATIENT)
Dept: CARDIAC SURGERY | Facility: HOSPITAL | Age: 66
DRG: 269 | End: 2021-06-25
Payer: COMMERCIAL

## 2021-06-25 ENCOUNTER — OFF PREMISE (OUTPATIENT)
Dept: CARDIOLOGY | Age: 66
End: 2021-06-25

## 2021-06-25 DIAGNOSIS — I71.4 AAA (ABDOMINAL AORTIC ANEURYSM) WITHOUT RUPTURE (HCC): Primary | ICD-10-CM

## 2021-06-25 DIAGNOSIS — Z91.89 AT HIGH RISK FOR BLEEDING: ICD-10-CM

## 2021-06-25 DIAGNOSIS — Z01.818 PREOP TESTING: ICD-10-CM

## 2021-06-25 PROBLEM — I25.10 CAD IN NATIVE ARTERY: Status: ACTIVE | Noted: 2018-12-17

## 2021-06-25 PROBLEM — I71.40 AAA (ABDOMINAL AORTIC ANEURYSM) WITHOUT RUPTURE: Status: ACTIVE | Noted: 2021-06-25

## 2021-06-25 PROBLEM — I71.40 AAA (ABDOMINAL AORTIC ANEURYSM) WITHOUT RUPTURE (HCC): Status: ACTIVE | Noted: 2021-06-25

## 2021-06-25 PROCEDURE — 04V03DZ RESTRICTION OF ABDOMINAL AORTA WITH INTRALUMINAL DEVICE, PERCUTANEOUS APPROACH: ICD-10-PCS | Performed by: SURGERY

## 2021-06-25 PROCEDURE — 34705 EVAC RPR A-BIILIAC NDGFT: CPT | Performed by: INTERNAL MEDICINE

## 2021-06-25 PROCEDURE — 99223 1ST HOSP IP/OBS HIGH 75: CPT | Performed by: HOSPITALIST

## 2021-06-25 PROCEDURE — 34713 PERQ ACCESS & CLSR FEM ART: CPT | Performed by: INTERNAL MEDICINE

## 2021-06-25 PROCEDURE — B41CYZZ FLUOROSCOPY OF PELVIC ARTERIES USING OTHER CONTRAST: ICD-10-PCS | Performed by: SURGERY

## 2021-06-25 PROCEDURE — B410YZZ FLUOROSCOPY OF ABDOMINAL AORTA USING OTHER CONTRAST: ICD-10-PCS | Performed by: SURGERY

## 2021-06-25 DEVICE — EXCLUDER AAA ENDO TRNK IPSI 23MMX12MMX18CM 16FR
Type: IMPLANTABLE DEVICE | Site: AORTA | Status: FUNCTIONAL
Brand: GORE EXCLUDER AAA ENDOPROSTHESIS WITH C3 DELIVERY

## 2021-06-25 DEVICE — EXCLUDER AAA ENDO CONTRA LEG 16MMX12MMX14CM 12FR
Type: IMPLANTABLE DEVICE | Site: ILIAC ARTERY | Status: FUNCTIONAL
Brand: GORE EXCLUDER AAA ENDOPROSTHESIS

## 2021-06-25 RX ORDER — SODIUM CHLORIDE 9 MG/ML
INJECTION, SOLUTION INTRAVENOUS CONTINUOUS PRN
Status: DISCONTINUED | OUTPATIENT
Start: 2021-06-25 | End: 2021-06-25 | Stop reason: SURG

## 2021-06-25 RX ORDER — ACETAMINOPHEN 325 MG/1
650 TABLET ORAL EVERY 4 HOURS PRN
Status: DISCONTINUED | OUTPATIENT
Start: 2021-06-25 | End: 2021-06-26

## 2021-06-25 RX ORDER — ACETAMINOPHEN AND CODEINE PHOSPHATE 300; 30 MG/1; MG/1
1 TABLET ORAL AS NEEDED
Status: DISCONTINUED | OUTPATIENT
Start: 2021-06-25 | End: 2021-06-25

## 2021-06-25 RX ORDER — ROCURONIUM BROMIDE 10 MG/ML
INJECTION, SOLUTION INTRAVENOUS AS NEEDED
Status: DISCONTINUED | OUTPATIENT
Start: 2021-06-25 | End: 2021-06-25 | Stop reason: SURG

## 2021-06-25 RX ORDER — ACETAMINOPHEN 500 MG
1000 TABLET ORAL ONCE AS NEEDED
Status: COMPLETED | OUTPATIENT
Start: 2021-06-25 | End: 2021-06-25

## 2021-06-25 RX ORDER — BUPIVACAINE HYDROCHLORIDE 5 MG/ML
INJECTION, SOLUTION EPIDURAL; INTRACAUDAL AS NEEDED
Status: DISCONTINUED | OUTPATIENT
Start: 2021-06-25 | End: 2021-06-26

## 2021-06-25 RX ORDER — HYDROMORPHONE HYDROCHLORIDE 1 MG/ML
0.4 INJECTION, SOLUTION INTRAMUSCULAR; INTRAVENOUS; SUBCUTANEOUS EVERY 5 MIN PRN
Status: ACTIVE | OUTPATIENT
Start: 2021-06-25 | End: 2021-06-25

## 2021-06-25 RX ORDER — HYDROCODONE BITARTRATE AND ACETAMINOPHEN 5; 325 MG/1; MG/1
1 TABLET ORAL EVERY 4 HOURS PRN
Status: DISCONTINUED | OUTPATIENT
Start: 2021-06-25 | End: 2021-06-25

## 2021-06-25 RX ORDER — ATORVASTATIN CALCIUM 40 MG/1
40 TABLET, FILM COATED ORAL
Status: DISCONTINUED | OUTPATIENT
Start: 2021-06-25 | End: 2021-06-26

## 2021-06-25 RX ORDER — HEPARIN SODIUM 1000 [USP'U]/ML
INJECTION, SOLUTION INTRAVENOUS; SUBCUTANEOUS AS NEEDED
Status: DISCONTINUED | OUTPATIENT
Start: 2021-06-25 | End: 2021-06-25 | Stop reason: SURG

## 2021-06-25 RX ORDER — ASPIRIN 81 MG/1
81 TABLET ORAL DAILY
Status: DISCONTINUED | OUTPATIENT
Start: 2021-06-25 | End: 2021-06-25

## 2021-06-25 RX ORDER — PROTAMINE SULFATE 10 MG/ML
INJECTION, SOLUTION INTRAVENOUS AS NEEDED
Status: DISCONTINUED | OUTPATIENT
Start: 2021-06-25 | End: 2021-06-25 | Stop reason: SURG

## 2021-06-25 RX ORDER — ASPIRIN 81 MG/1
81 TABLET ORAL DAILY
Status: DISCONTINUED | OUTPATIENT
Start: 2021-06-25 | End: 2021-06-26

## 2021-06-25 RX ORDER — SODIUM CHLORIDE, SODIUM LACTATE, POTASSIUM CHLORIDE, CALCIUM CHLORIDE 600; 310; 30; 20 MG/100ML; MG/100ML; MG/100ML; MG/100ML
INJECTION, SOLUTION INTRAVENOUS CONTINUOUS
Status: DISCONTINUED | OUTPATIENT
Start: 2021-06-25 | End: 2021-06-26

## 2021-06-25 RX ORDER — ASCORBIC ACID 500 MG
500 TABLET ORAL DAILY
Status: DISCONTINUED | OUTPATIENT
Start: 2021-06-25 | End: 2021-06-26

## 2021-06-25 RX ORDER — NITROGLYCERIN 20 MG/100ML
INJECTION INTRAVENOUS
Status: DISCONTINUED | OUTPATIENT
Start: 2021-06-25 | End: 2021-06-26

## 2021-06-25 RX ORDER — ONDANSETRON 2 MG/ML
4 INJECTION INTRAMUSCULAR; INTRAVENOUS AS NEEDED
Status: ACTIVE | OUTPATIENT
Start: 2021-06-25 | End: 2021-06-25

## 2021-06-25 RX ORDER — MORPHINE SULFATE 2 MG/ML
2 INJECTION, SOLUTION INTRAMUSCULAR; INTRAVENOUS EVERY 2 HOUR PRN
Status: DISCONTINUED | OUTPATIENT
Start: 2021-06-25 | End: 2021-06-26

## 2021-06-25 RX ORDER — GLYCOPYRROLATE 0.2 MG/ML
INJECTION, SOLUTION INTRAMUSCULAR; INTRAVENOUS AS NEEDED
Status: DISCONTINUED | OUTPATIENT
Start: 2021-06-25 | End: 2021-06-25 | Stop reason: SURG

## 2021-06-25 RX ORDER — SODIUM CHLORIDE 9 MG/ML
INJECTION, SOLUTION INTRAVENOUS CONTINUOUS
Status: DISCONTINUED | OUTPATIENT
Start: 2021-06-25 | End: 2021-06-25

## 2021-06-25 RX ORDER — DEXAMETHASONE SODIUM PHOSPHATE 4 MG/ML
VIAL (ML) INJECTION AS NEEDED
Status: DISCONTINUED | OUTPATIENT
Start: 2021-06-25 | End: 2021-06-25 | Stop reason: SURG

## 2021-06-25 RX ORDER — CEFAZOLIN SODIUM/WATER 2 G/20 ML
SYRINGE (ML) INTRAVENOUS AS NEEDED
Status: DISCONTINUED | OUTPATIENT
Start: 2021-06-25 | End: 2021-06-25 | Stop reason: SURG

## 2021-06-25 RX ORDER — NALOXONE HYDROCHLORIDE 0.4 MG/ML
80 INJECTION, SOLUTION INTRAMUSCULAR; INTRAVENOUS; SUBCUTANEOUS AS NEEDED
Status: ACTIVE | OUTPATIENT
Start: 2021-06-25 | End: 2021-06-25

## 2021-06-25 RX ORDER — ACETAMINOPHEN AND CODEINE PHOSPHATE 300; 30 MG/1; MG/1
2 TABLET ORAL AS NEEDED
Status: DISCONTINUED | OUTPATIENT
Start: 2021-06-25 | End: 2021-06-25

## 2021-06-25 RX ORDER — PANTOPRAZOLE SODIUM 40 MG/1
40 TABLET, DELAYED RELEASE ORAL EVERY 24 HOURS
Status: DISCONTINUED | OUTPATIENT
Start: 2021-06-25 | End: 2021-06-26

## 2021-06-25 RX ORDER — NEOSTIGMINE METHYLSULFATE 1 MG/ML
INJECTION INTRAVENOUS AS NEEDED
Status: DISCONTINUED | OUTPATIENT
Start: 2021-06-25 | End: 2021-06-25 | Stop reason: SURG

## 2021-06-25 RX ORDER — METOCLOPRAMIDE HYDROCHLORIDE 5 MG/ML
10 INJECTION INTRAMUSCULAR; INTRAVENOUS AS NEEDED
Status: ACTIVE | OUTPATIENT
Start: 2021-06-25 | End: 2021-06-25

## 2021-06-25 RX ORDER — HYDROCODONE BITARTRATE AND ACETAMINOPHEN 5; 325 MG/1; MG/1
2 TABLET ORAL EVERY 4 HOURS PRN
Status: DISCONTINUED | OUTPATIENT
Start: 2021-06-25 | End: 2021-06-25

## 2021-06-25 RX ORDER — LIDOCAINE HYDROCHLORIDE 10 MG/ML
INJECTION, SOLUTION EPIDURAL; INFILTRATION; INTRACAUDAL; PERINEURAL AS NEEDED
Status: DISCONTINUED | OUTPATIENT
Start: 2021-06-25 | End: 2021-06-25 | Stop reason: SURG

## 2021-06-25 RX ORDER — ONDANSETRON 2 MG/ML
INJECTION INTRAMUSCULAR; INTRAVENOUS AS NEEDED
Status: DISCONTINUED | OUTPATIENT
Start: 2021-06-25 | End: 2021-06-25 | Stop reason: SURG

## 2021-06-25 RX ORDER — CLOPIDOGREL BISULFATE 75 MG/1
75 TABLET ORAL DAILY
Status: DISCONTINUED | OUTPATIENT
Start: 2021-06-25 | End: 2021-06-26

## 2021-06-25 RX ORDER — ONDANSETRON 2 MG/ML
4 INJECTION INTRAMUSCULAR; INTRAVENOUS EVERY 6 HOURS PRN
Status: DISCONTINUED | OUTPATIENT
Start: 2021-06-25 | End: 2021-06-26

## 2021-06-25 RX ORDER — MORPHINE SULFATE 2 MG/ML
1 INJECTION, SOLUTION INTRAMUSCULAR; INTRAVENOUS EVERY 2 HOUR PRN
Status: DISCONTINUED | OUTPATIENT
Start: 2021-06-25 | End: 2021-06-26

## 2021-06-25 RX ORDER — METOPROLOL SUCCINATE 50 MG/1
50 TABLET, EXTENDED RELEASE ORAL
Status: DISCONTINUED | OUTPATIENT
Start: 2021-06-26 | End: 2021-06-26

## 2021-06-25 RX ORDER — PANTOPRAZOLE SODIUM 40 MG/1
40 TABLET, DELAYED RELEASE ORAL
Status: DISCONTINUED | OUTPATIENT
Start: 2021-06-26 | End: 2021-06-25

## 2021-06-25 RX ORDER — CEFAZOLIN SODIUM/WATER 2 G/20 ML
2 SYRINGE (ML) INTRAVENOUS EVERY 8 HOURS
Status: COMPLETED | OUTPATIENT
Start: 2021-06-25 | End: 2021-06-26

## 2021-06-25 RX ADMIN — ONDANSETRON 4 MG: 2 INJECTION INTRAMUSCULAR; INTRAVENOUS at 08:47:00

## 2021-06-25 RX ADMIN — HEPARIN SODIUM 3000 UNITS: 1000 INJECTION, SOLUTION INTRAVENOUS; SUBCUTANEOUS at 08:16:00

## 2021-06-25 RX ADMIN — PROTAMINE SULFATE 10 MG: 10 INJECTION, SOLUTION INTRAVENOUS at 08:46:00

## 2021-06-25 RX ADMIN — PROTAMINE SULFATE 20 MG: 10 INJECTION, SOLUTION INTRAVENOUS at 08:48:00

## 2021-06-25 RX ADMIN — CEFAZOLIN SODIUM/WATER 2 G: 2 G/20 ML SYRINGE (ML) INTRAVENOUS at 07:27:00

## 2021-06-25 RX ADMIN — GLYCOPYRROLATE 0.4 MG: 0.2 INJECTION, SOLUTION INTRAMUSCULAR; INTRAVENOUS at 08:49:00

## 2021-06-25 RX ADMIN — LIDOCAINE HYDROCHLORIDE 50 MG: 10 INJECTION, SOLUTION EPIDURAL; INFILTRATION; INTRACAUDAL; PERINEURAL at 07:11:00

## 2021-06-25 RX ADMIN — ROCURONIUM BROMIDE 80 MG: 10 INJECTION, SOLUTION INTRAVENOUS at 07:12:00

## 2021-06-25 RX ADMIN — GLYCOPYRROLATE 0.4 MG: 0.2 INJECTION, SOLUTION INTRAMUSCULAR; INTRAVENOUS at 08:51:00

## 2021-06-25 RX ADMIN — NEOSTIGMINE METHYLSULFATE 1 MG: 1 INJECTION INTRAVENOUS at 08:51:00

## 2021-06-25 RX ADMIN — SODIUM CHLORIDE: 9 INJECTION, SOLUTION INTRAVENOUS at 07:07:00

## 2021-06-25 RX ADMIN — DEXAMETHASONE SODIUM PHOSPHATE 4 MG: 4 MG/ML VIAL (ML) INJECTION at 07:45:00

## 2021-06-25 RX ADMIN — HEPARIN SODIUM 12000 UNITS: 1000 INJECTION, SOLUTION INTRAVENOUS; SUBCUTANEOUS at 08:01:00

## 2021-06-25 RX ADMIN — NEOSTIGMINE METHYLSULFATE 2 MG: 1 INJECTION INTRAVENOUS at 08:49:00

## 2021-06-25 NOTE — CONSULTS
[address] Patient Status:  Inpatient    1955 MRN [id number]   [facility] 6NE-A Attending Nancy Osler, MD   Hosp Day # 0 PCP Shaji Day MD     Reason for consult: med mgmt    Requested quit smoking. He has never used smokeless tobacco. He reports current alcohol use of about 5.0 standard drinks of alcohol per week. He reports that he does not use drugs.     Family History:   Family History   Problem Relation Age of Onset   • Colon Cancer BMI 27.07 kg/m²   General: No acute distress. Alert and oriented x 3. HEENT: Normocephalic atraumatic. Moist mucous membranes. EOM-I. PERRLA. Anicteric. Neck: No lymphadenopathy. No JVD. No carotid bruits. Respiratory: Clear to auscultation bilaterally. monitor  4. Gastric reflux  5.  Hyperlipidemia    Quality:  · DVT Prophylaxis: Ambulate  · CODE status: Full  · Lang: Yes    Plan of care discussed with patient and family    Amelia Beatty MD  6/25/2021

## 2021-06-25 NOTE — OPERATIVE REPORT
[facility] (formerly [facility])    PATIENT'S NAME: Ed Santiago   ATTENDING PHYSICIAN: Violeta Mancuso M.D. OPERATING PHYSICIAN: Violeta Mancuso M.D.    PATIENT ACCOUNT#:   [de-identified]    LOCATION:  [address] [place] [address]  MEDICAL RECORD #:   [id number]       DATE OF BIRTH:  0 chest, abdomen, both groins, and thighs prepped and draped in the usual sterile technique. Lisa Burrs was used to cover the operative field. Ultrasound was done to image both the right and left femoral arteries.   They were punctured with micropunct portion of the ipsilateral limb was deployed and ended just above the iliac bifurcation.     MOB balloon was brought up on both the right and left side and opened up to splay the graft up against the wall in the proximal portion and the overlapped areas dis

## 2021-06-25 NOTE — ANESTHESIA POSTPROCEDURE EVALUATION
[address] Patient Status:  Inpatient   Age/Gender 73year old male MRN [id number]   [facility] 6NE-A Attending Orlin Roach MD   Hosp Day # 0 PCP Mary Briggs MD       Anesthesia Post-op Note    ABDOMINAL AORT

## 2021-06-25 NOTE — PAYOR COMM NOTE
--------------  ADMISSION REVIEW     Payor: [address] [place] [address] Road #:  [id number]  Authorization Number: [id number]    Admit date: 6/25/21  Admit time:  6:03 AM       Admitting Physician: Orlin Roach MD  Attending Physician PM       06/25/21 1200 98.6 °F (37 °C) 67 19 135/77 99 % — None (Room air) — ML   06/25/21 1130 — 64 21 137/78 99 % — — — ML   06/25/21 1100 — 63 18 138/80 99 % — — — ML   06/25/21 1030 — 68 20 138/78 98 % — — — ML   06/25/21 1015 — 60 19 142/76 97 % — — — Dose Route User    6/25/2021 0851 Given 0.4 mg Intravenous Jacqualine Jennifer Noriega MD    6/25/2021 4302 Given 0.4 mg Intravenous Nataliiaaline Jennifer Noriega MD      heparin sodium 1000 UNIT/ML injection     Date Action Dose Route User    6/25/2021 0816 Given 3,000 U [facility] (none) Intravenous Mortimer Keegan Noriega MD          REVIEWER COMMENTS:     OTHER:

## 2021-06-25 NOTE — PROGRESS NOTES
74 y/o male with expanding AAA, hx CABG,    Bonner/Walss  Main body 16 Telugu RFA with [id number]   Contralateral left 12 Telugu 16 x 12 x 14    Excellent result

## 2021-06-25 NOTE — OPERATIVE REPORT
Pre-op Dx: AAA. Post-op Dx: same. Procedure: Duplex US guided r/l femoral artery access. Aortic angiogram/ [facility] [place] stent [id number] right- 16Fr Dry Seal sheath/ [id number]  Contralateral limb 12Fr Dry Seal sheath.  DAP [id number]/ Fluoro- 16.8 min/ 110cc c

## 2021-06-25 NOTE — ANESTHESIA PREPROCEDURE EVALUATION
PRE-OP EVALUATION    Patient Name: Edel Valderrama    Admit Diagnosis: abdominal aortic aneurysm    Pre-op Diagnosis: abdominal aortic aneurysm    ABDOMINAL AORTIC ANEURYSM ENDOVASCULAR STENT GRAFT REPAIR    Anesthesia Procedure: ABDOMINAL AORTIC ANEURYSM reviewed.     Anesthetic Complications  (-) history of anesthetic complications         GI/Hepatic/Renal      (+) GERD                           Cardiovascular                  (+) hypertension   (+) hyperlipidemia  (+) CAD    (+) CABG/stent opening: >3 FB  TM distance: > 6 cm  Neck ROM: full Cardiovascular    Cardiovascular exam normal.  Rhythm: regular  Rate: normal     Dental    No notable dental history.          Pulmonary    Pulmonary exam normal.  Breath sounds clear to auscultation bilat

## 2021-06-25 NOTE — ANESTHESIA PROCEDURE NOTES
Airway  Urgency: elective      General Information and Staff    Patient location during procedure: OR  Anesthesiologist: Santi Cheng MD  Performed: anesthesiologist     Indications and Patient Condition  Indications for airway management: anesthes

## 2021-06-26 VITALS
TEMPERATURE: 98 F | HEIGHT: 71 IN | WEIGHT: 199.31 LBS | SYSTOLIC BLOOD PRESSURE: 125 MMHG | BODY MASS INDEX: 27.9 KG/M2 | RESPIRATION RATE: 24 BRPM | OXYGEN SATURATION: 100 % | DIASTOLIC BLOOD PRESSURE: 81 MMHG | HEART RATE: 60 BPM

## 2021-06-26 PROCEDURE — 99232 SBSQ HOSP IP/OBS MODERATE 35: CPT | Performed by: HOSPITALIST

## 2021-06-26 PROCEDURE — 99024 POSTOP FOLLOW-UP VISIT: CPT | Performed by: INTERNAL MEDICINE

## 2021-06-26 RX ORDER — CLOPIDOGREL BISULFATE 75 MG/1
75 TABLET ORAL DAILY
Qty: 30 TABLET | Refills: 1 | Status: SHIPPED | OUTPATIENT
Start: 2021-06-27 | End: 2021-07-29 | Stop reason: ALTCHOICE

## 2021-06-26 RX ORDER — PANTOPRAZOLE SODIUM 40 MG/1
TABLET, DELAYED RELEASE ORAL
Qty: 90 TABLET | Refills: 0 | Status: SHIPPED | OUTPATIENT
Start: 2021-06-26

## 2021-06-26 NOTE — PROGRESS NOTES
[facility]  Progress Note     Jose Diaz Patient Status:  Inpatient    1955 MRN [id number]   [facility] 6NE-A Attending Jovon Corley MD   Hosp Day # 1 PCP Lidia Robles MD     Chief Complaint: med mgmt    S: Glory Saleem Imaging data reviewed in Epic.     Medications:   • Clopidogrel Bisulfate  75 mg Oral Daily   • pantoprazole (PROTONIX) IV push  40 mg Intravenous Q24H    Or   • Pantoprazole Sodium  40 mg Oral Q24H   • ascorbic acid (VITAMIN C)  500 mg Oral Daily   • aspir

## 2021-06-26 NOTE — PROGRESS NOTES
No complaints. Wounds clean/dry. Neuro intact. Pulses present.  Instructed on wound care/ activity/ follow up

## 2021-06-26 NOTE — PLAN OF CARE
POD#1 s/p Endo AAA repair. Awake, alert, ambulatory. Minimal discomfort. Bilateral groins soft/intact, asymptomatic. Good CSM to BLE. BP WNL.  Spoke with cardiology about taking Metoprolol ER BID, MD suggests taking only once per day and following up with rafaela
Rec'd pt from [facility], s/p EndoAAA repair. Bilateral groins intact. BP stable. Sayda f/c removed this evening, pt out of bed to chair. Pt and pt's spouse updated on POC.
Received pt at 1930. A/O x4. VSS. Bilateral groins GREY, CDI. No hematoma, no bleeding noted. Pedal pulses palpable. Tylenol administered for chronic, back pain. Encourage IS use. Plan of care explained. Continue to monitor.
18

## 2021-06-28 ENCOUNTER — TELEPHONE (OUTPATIENT)
Dept: CARDIOLOGY | Age: 66
End: 2021-06-28

## 2021-06-28 ENCOUNTER — PATIENT OUTREACH (OUTPATIENT)
Dept: CASE MANAGEMENT | Age: 66
End: 2021-06-28

## 2021-06-28 DIAGNOSIS — Z02.9 ENCOUNTERS FOR UNSPECIFIED ADMINISTRATIVE PURPOSE: ICD-10-CM

## 2021-06-28 DIAGNOSIS — I71.4 AAA (ABDOMINAL AORTIC ANEURYSM) WITHOUT RUPTURE (HCC): ICD-10-CM

## 2021-06-28 PROCEDURE — 1111F DSCHRG MED/CURRENT MED MERGE: CPT

## 2021-06-28 NOTE — PROGRESS NOTES
Initial Post Discharge Follow Up   Discharge Date: 6/26/21  Contact Date: 6/28/2021    Consent Verification:  Assessment Completed With: Patient  HIPAA Verified?   Yes    Discharge Dx:  Abdominal aortic aneurysm endovascular stent graft repair    Was TCC there any medication changes noted on AVS?  yes  o If so, were these medication changes discussed with you prior to leaving the hospital? yes  • (NCM) If a new medication was prescribed:    o Was the new medication’s purpose & side effects reviewed?  yes  o recent admission and discharge instructions with patient. He denies any new or worsening symptoms.  Patient was educated on symptoms of infection and if he should experience those symptoms or develop SOB, chest pain, fever/chills, or any new concerns, he

## 2021-06-28 NOTE — DISCHARGE SUMMARY
[address]    PATIENT'S NAME: Josefina Alexandra   ATTENDING PHYSICIAN: Patrica Rutherford M.D.    PATIENT ACCOUNT#:   [de-identified]    LOCATION:  [address] [place] [address]  MEDICAL RECORD #:   [id number]       YOB: 1955  ADMISSION DATE:       06/25/2021

## 2021-07-08 ENCOUNTER — OFFICE VISIT (OUTPATIENT)
Dept: FAMILY MEDICINE CLINIC | Facility: CLINIC | Age: 66
End: 2021-07-08
Payer: COMMERCIAL

## 2021-07-08 VITALS
BODY MASS INDEX: 27.3 KG/M2 | WEIGHT: 195 LBS | HEART RATE: 64 BPM | SYSTOLIC BLOOD PRESSURE: 124 MMHG | TEMPERATURE: 99 F | DIASTOLIC BLOOD PRESSURE: 80 MMHG | RESPIRATION RATE: 16 BRPM | HEIGHT: 71 IN

## 2021-07-08 DIAGNOSIS — E78.5 HYPERLIPIDEMIA, UNSPECIFIED HYPERLIPIDEMIA TYPE: ICD-10-CM

## 2021-07-08 DIAGNOSIS — I10 ESSENTIAL HYPERTENSION, BENIGN: ICD-10-CM

## 2021-07-08 DIAGNOSIS — I25.10 CORONARY ARTERY DISEASE INVOLVING NATIVE HEART WITHOUT ANGINA PECTORIS, UNSPECIFIED VESSEL OR LESION TYPE: ICD-10-CM

## 2021-07-08 DIAGNOSIS — Z86.79 STATUS POST AAA (ABDOMINAL AORTIC ANEURYSM) REPAIR: ICD-10-CM

## 2021-07-08 DIAGNOSIS — Z98.890 STATUS POST AAA (ABDOMINAL AORTIC ANEURYSM) REPAIR: ICD-10-CM

## 2021-07-08 DIAGNOSIS — I71.4 ABDOMINAL AORTIC ANEURYSM (AAA) WITHOUT RUPTURE (HCC): Primary | ICD-10-CM

## 2021-07-08 PROCEDURE — 99495 TRANSJ CARE MGMT MOD F2F 14D: CPT | Performed by: FAMILY MEDICINE

## 2021-07-08 PROCEDURE — 3079F DIAST BP 80-89 MM HG: CPT | Performed by: FAMILY MEDICINE

## 2021-07-08 PROCEDURE — 3008F BODY MASS INDEX DOCD: CPT | Performed by: FAMILY MEDICINE

## 2021-07-08 PROCEDURE — 3074F SYST BP LT 130 MM HG: CPT | Performed by: FAMILY MEDICINE

## 2021-07-08 NOTE — PROGRESS NOTES
HPI:    Roger Lares is a 73year old male here today for hospital follow up.    He was discharged from Inpatient hospital, [facility] to Home   Admission Date: 6/25/21   Discharge Date: 6/26/21  Hospital Discharge Diagnoses (since 6/8/2021)   Non daily.  atorvastatin 40 MG Oral Tab, Take 1 tablet (40 mg total) by mouth once daily. Metoprolol Succinate ER 50 MG Oral Tablet 24 Hr, TAKE 1 TABLET(50 MG) BY MOUTH TWICE DAILY (Patient taking differently: Take 50 mg by mouth daily.  TAKE 1 TABLET(50 MG) B range of motion of extremities  NEURO: denies headaches, denies dizziness, denies weakness  PSYCHE: denies depression or anxiety  HEMATOLOGIC: denies hx of anemia, or bruising, denies bleeding  ENDOCRINE: denies thyroid history  ALL/ASTHMA: denies hx of al period of discharge to 30 days:   · Number of Possible Diagnoses and/or Management Options: moderate  · Amount and/or Complexity of Data to Be Reviewed: moderate  · Risk of Significant Complications, Morbidity, and/or Mortality: moderate    Overall Risk:

## 2021-07-09 ENCOUNTER — MED REC SCAN ONLY (OUTPATIENT)
Dept: FAMILY MEDICINE CLINIC | Facility: CLINIC | Age: 66
End: 2021-07-09

## 2021-07-14 ENCOUNTER — MOBILE ENCOUNTER (OUTPATIENT)
Dept: FAMILY MEDICINE CLINIC | Facility: CLINIC | Age: 66
End: 2021-07-14

## 2021-07-14 DIAGNOSIS — I10 ESSENTIAL HYPERTENSION, BENIGN: ICD-10-CM

## 2021-07-14 DIAGNOSIS — I71.4 ABDOMINAL AORTIC ANEURYSM (AAA) WITHOUT RUPTURE (HCC): ICD-10-CM

## 2021-07-14 DIAGNOSIS — I25.10 CORONARY ARTERY DISEASE INVOLVING NATIVE HEART WITHOUT ANGINA PECTORIS, UNSPECIFIED VESSEL OR LESION TYPE: ICD-10-CM

## 2021-07-14 DIAGNOSIS — E78.5 HYPERLIPIDEMIA, UNSPECIFIED HYPERLIPIDEMIA TYPE: ICD-10-CM

## 2021-07-14 RX ORDER — ATORVASTATIN CALCIUM 40 MG/1
40 TABLET, FILM COATED ORAL
Qty: 90 TABLET | Refills: 1 | Status: SHIPPED | OUTPATIENT
Start: 2021-07-14 | End: 2022-01-04

## 2021-07-14 RX ORDER — METOPROLOL SUCCINATE 50 MG/1
TABLET, EXTENDED RELEASE ORAL
Qty: 90 TABLET | Refills: 1 | Status: SHIPPED | OUTPATIENT
Start: 2021-07-14 | End: 2022-01-04

## 2021-07-27 ENCOUNTER — HOSPITAL ENCOUNTER (OUTPATIENT)
Dept: CT IMAGING | Facility: HOSPITAL | Age: 66
Discharge: HOME | End: 2021-07-27
Attending: SURGERY
Payer: COMMERCIAL

## 2021-07-27 DIAGNOSIS — I71.4 AAA (ABDOMINAL AORTIC ANEURYSM) (HCC): ICD-10-CM

## 2021-07-27 PROCEDURE — 82565 ASSAY OF CREATININE: CPT

## 2021-07-27 PROCEDURE — 74174 CTA ABD&PLVS W/CONTRAST: CPT | Performed by: SURGERY

## 2021-07-28 LAB — CREAT BLD-MCNC: 1.2 MG/DL

## 2021-07-29 ENCOUNTER — OFFICE VISIT (OUTPATIENT)
Dept: FAMILY MEDICINE CLINIC | Facility: CLINIC | Age: 66
End: 2021-07-29
Payer: COMMERCIAL

## 2021-07-29 VITALS
HEART RATE: 68 BPM | RESPIRATION RATE: 16 BRPM | WEIGHT: 196 LBS | HEIGHT: 71.5 IN | TEMPERATURE: 99 F | SYSTOLIC BLOOD PRESSURE: 118 MMHG | DIASTOLIC BLOOD PRESSURE: 74 MMHG | BODY MASS INDEX: 26.84 KG/M2

## 2021-07-29 DIAGNOSIS — E78.5 HYPERLIPIDEMIA, UNSPECIFIED HYPERLIPIDEMIA TYPE: ICD-10-CM

## 2021-07-29 DIAGNOSIS — R35.1 BENIGN PROSTATIC HYPERPLASIA WITH NOCTURIA: ICD-10-CM

## 2021-07-29 DIAGNOSIS — R79.9 ABNORMAL BLOOD CHEMISTRY: ICD-10-CM

## 2021-07-29 DIAGNOSIS — N40.1 BENIGN PROSTATIC HYPERPLASIA WITH NOCTURIA: ICD-10-CM

## 2021-07-29 DIAGNOSIS — Z98.890 STATUS POST AAA (ABDOMINAL AORTIC ANEURYSM) REPAIR: ICD-10-CM

## 2021-07-29 DIAGNOSIS — I25.10 CORONARY ARTERY DISEASE INVOLVING NATIVE HEART WITHOUT ANGINA PECTORIS, UNSPECIFIED VESSEL OR LESION TYPE: ICD-10-CM

## 2021-07-29 DIAGNOSIS — Z00.00 ANNUAL PHYSICAL EXAM: ICD-10-CM

## 2021-07-29 DIAGNOSIS — Z86.79 STATUS POST AAA (ABDOMINAL AORTIC ANEURYSM) REPAIR: ICD-10-CM

## 2021-07-29 DIAGNOSIS — Z23 NEED FOR TDAP VACCINATION: Primary | ICD-10-CM

## 2021-07-29 DIAGNOSIS — Z23 NEED FOR SHINGLES VACCINE: ICD-10-CM

## 2021-07-29 DIAGNOSIS — R79.89 ABNORMAL CBC: ICD-10-CM

## 2021-07-29 DIAGNOSIS — I10 ESSENTIAL HYPERTENSION, BENIGN: ICD-10-CM

## 2021-07-29 PROCEDURE — 90471 IMMUNIZATION ADMIN: CPT | Performed by: FAMILY MEDICINE

## 2021-07-29 PROCEDURE — 3008F BODY MASS INDEX DOCD: CPT | Performed by: FAMILY MEDICINE

## 2021-07-29 PROCEDURE — 3078F DIAST BP <80 MM HG: CPT | Performed by: FAMILY MEDICINE

## 2021-07-29 PROCEDURE — 90472 IMMUNIZATION ADMIN EACH ADD: CPT | Performed by: FAMILY MEDICINE

## 2021-07-29 PROCEDURE — 90715 TDAP VACCINE 7 YRS/> IM: CPT | Performed by: FAMILY MEDICINE

## 2021-07-29 PROCEDURE — 99397 PER PM REEVAL EST PAT 65+ YR: CPT | Performed by: FAMILY MEDICINE

## 2021-07-29 PROCEDURE — 90750 HZV VACC RECOMBINANT IM: CPT | Performed by: FAMILY MEDICINE

## 2021-07-29 PROCEDURE — 3074F SYST BP LT 130 MM HG: CPT | Performed by: FAMILY MEDICINE

## 2021-07-29 NOTE — OPERATIVE REPORT
[address]    PATIENT'S NAME: Demetri Elizabeth   ATTENDING PHYSICIAN: Sandy Almeida M.D. OPERATING PHYSICIAN: Maurice Paul M.D.    PATIENT ACCOUNT#:   [de-identified]    LOCATION:  [address] [place] [address]  MEDICAL RECORD #:   [id number]       DATE OF BIRTH:

## 2021-07-29 NOTE — ANESTHESIA PROCEDURE NOTES
Arterial Line  Performed by: Luis A Garcia MD  Authorized by: Luis A Garcia MD     General Information and Staff    Procedure Start:   Anesthesiologist: Luis A Garcia MD  Patient Location:  OR  Indication: continuous blood pressure m

## 2021-07-29 NOTE — ADDENDUM NOTE
Addendum  created 07/29/21 1503 by Mally Pugh MD    Child order released for a procedure order, Clinical Note Signed, Intraprocedure Blocks edited, LDA created via procedure documentation

## 2021-08-03 NOTE — PROGRESS NOTES
Geovanny Dickinson is a 73year old male who presents for a complete physical exam.   HPI:   Pt is here for follow-up of his CAD and hypercholesterolemia. He did have a CABG in 12/18.   He had a LIMA to the LAD, saphenous vein graft to a diagonal and posterio Oral Tab EC TAKE 1 TABLET(40 MG) BY MOUTH EVERY DAY 90 tablet 0   • Coenzyme Q10 (CO Q 10 OR) Take by mouth daily. • aspirin 81 MG Oral Tab Take 81 mg by mouth daily.          PAST MEDICAL HISTORY:     Past Medical History:   Diagnosis Date   • AAA (abd 5 Cans of beer per week      Comment: 1-6 DRINKS WEEK    Drug use: No     Occ: Salesman. : Yes. Children: 2 (1 child with cerebral palsy  following spinal surgery). Exercise: three times per week.   Diet: watches calories closely    REVIEW OF S benign  Hyperlipidemia, unspecified hyperlipidemia type  Need for tdap vaccination  (primary encounter diagnosis)  Need for shingles vaccine  Abnormal blood chemistry  Abnormal cbc  Status post aaa (abdominal aortic aneurysm) repair  Coronary artery diseas

## 2021-08-23 PROBLEM — E78.00 PURE HYPERCHOLESTEROLEMIA: Status: ACTIVE | Noted: 2019-01-02

## 2021-09-07 RX ORDER — CELECOXIB 200 MG/1
200 CAPSULE ORAL 2 TIMES DAILY
Qty: 60 CAPSULE | Refills: 0 | Status: SHIPPED | OUTPATIENT
Start: 2021-09-07 | End: 2021-09-11

## 2021-09-07 NOTE — LETTER
[address] [facility] FOR BLOOD OR BLOOD COMPONENTS      In the course of your treatment, it may become necessary to administer a transfusion of blood or blood components.  This form provides basic information concerning this proc NEW 10 22 20 - WARM COMPRESSES. explain the alternatives to you if it has not already been done. I,Cheng Zurita, have read/had read to me the above. I understand the matters bearing on the decision whether or not to authorize a transfusion of blood or blood components.  I have no ques

## 2021-09-11 ENCOUNTER — MOBILE ENCOUNTER (OUTPATIENT)
Dept: FAMILY MEDICINE CLINIC | Facility: CLINIC | Age: 66
End: 2021-09-11

## 2021-09-11 RX ORDER — CELECOXIB 200 MG/1
200 CAPSULE ORAL 2 TIMES DAILY
Qty: 60 CAPSULE | Refills: 5 | Status: SHIPPED | OUTPATIENT
Start: 2021-09-11

## 2021-10-19 ENCOUNTER — LAB ENCOUNTER (OUTPATIENT)
Dept: LAB | Age: 66
End: 2021-10-19
Attending: INTERNAL MEDICINE
Payer: COMMERCIAL

## 2021-10-19 DIAGNOSIS — Z20.822 ENCOUNTER FOR PREPROCEDURE SCREENING LABORATORY TESTING FOR COVID-19: ICD-10-CM

## 2021-10-19 DIAGNOSIS — Z01.812 ENCOUNTER FOR PREPROCEDURE SCREENING LABORATORY TESTING FOR COVID-19: ICD-10-CM

## 2021-10-22 ENCOUNTER — HOSPITAL ENCOUNTER (OUTPATIENT)
Facility: HOSPITAL | Age: 66
Setting detail: HOSPITAL OUTPATIENT SURGERY
Discharge: HOME | End: 2021-10-22
Attending: INTERNAL MEDICINE | Admitting: INTERNAL MEDICINE
Payer: COMMERCIAL

## 2021-10-22 ENCOUNTER — ANESTHESIA EVENT (OUTPATIENT)
Dept: ENDOSCOPY | Facility: HOSPITAL | Age: 66
End: 2021-10-22
Payer: COMMERCIAL

## 2021-10-22 ENCOUNTER — ANESTHESIA (OUTPATIENT)
Dept: ENDOSCOPY | Facility: HOSPITAL | Age: 66
End: 2021-10-22
Payer: COMMERCIAL

## 2021-10-22 VITALS
WEIGHT: 192 LBS | DIASTOLIC BLOOD PRESSURE: 74 MMHG | RESPIRATION RATE: 18 BRPM | HEIGHT: 71 IN | HEART RATE: 60 BPM | SYSTOLIC BLOOD PRESSURE: 111 MMHG | BODY MASS INDEX: 26.88 KG/M2 | TEMPERATURE: 99 F | OXYGEN SATURATION: 97 %

## 2021-10-22 DIAGNOSIS — Z12.11 COLON CANCER SCREENING: ICD-10-CM

## 2021-10-22 DIAGNOSIS — Z01.812 ENCOUNTER FOR PREPROCEDURE SCREENING LABORATORY TESTING FOR COVID-19: Primary | ICD-10-CM

## 2021-10-22 DIAGNOSIS — Z20.822 ENCOUNTER FOR PREPROCEDURE SCREENING LABORATORY TESTING FOR COVID-19: Primary | ICD-10-CM

## 2021-10-22 PROCEDURE — 0DJD8ZZ INSPECTION OF LOWER INTESTINAL TRACT, VIA NATURAL OR ARTIFICIAL OPENING ENDOSCOPIC: ICD-10-PCS | Performed by: INTERNAL MEDICINE

## 2021-10-22 RX ORDER — SODIUM CHLORIDE, SODIUM LACTATE, POTASSIUM CHLORIDE, CALCIUM CHLORIDE 600; 310; 30; 20 MG/100ML; MG/100ML; MG/100ML; MG/100ML
INJECTION, SOLUTION INTRAVENOUS CONTINUOUS
Status: DISCONTINUED | OUTPATIENT
Start: 2021-10-22 | End: 2021-10-22

## 2021-10-22 RX ADMIN — SODIUM CHLORIDE, SODIUM LACTATE, POTASSIUM CHLORIDE, CALCIUM CHLORIDE: 600; 310; 30; 20 INJECTION, SOLUTION INTRAVENOUS at 09:06:00

## 2021-10-22 NOTE — ANESTHESIA PREPROCEDURE EVALUATION
PRE-OP EVALUATION    Patient Name: Claudine Chávez    Admit Diagnosis: Colon cancer screening [Z12.11]    Pre-op Diagnosis: Colon cancer screening [Z12.11]    COLONOSCOPY    Anesthesia Procedure: COLONOSCOPY (N/A )    Surgeon(s) and Role:     * CATHRYN Jackson (+) CAD    (+) CABG/stent                            Endo/Other                                  Pulmonary                    (+) sleep apnea and CPAP      Neuro/Psych                 (+) neuromuscular disease                   Past Surgical History:

## 2021-10-22 NOTE — ANESTHESIA POSTPROCEDURE EVALUATION
[address] Patient Status:  Hospital Outpatient Surgery   Age/Gender 73year old male MRN [id number]   Location [address] [place] [address] Attending Laney Balbuena, [facility] Day # 0 PCP MD Annabella Lopez

## 2021-10-22 NOTE — H&P
History & Physical Examination    Patient Name: Palomo Oquendo  MRN: [id number]  [facility]: [id number]  YOB: 1955    Diagnosis: family history of colon cancer    Present Illness: 74 y/o M history as above presents for Colonoscopy.      No medication Diabetes Maternal Grandmother    • Cancer Maternal Grandfather    • Heart Attack Paternal Grandmother      Social History    Tobacco Use      Smoking status: Former Smoker        Years: 12.00      Smokeless tobacco: Never Used      Tobacco comment: QUIT 28

## 2021-10-22 NOTE — OPERATIVE REPORT
Addi Del Angel Patient Status:  Hospital Outpatient Surgery    1955 MRN [id number]   Location [address] [place] [address] Attending Asim Shaikh, [address] [facility] Day # 0 PCP Jem Perry MD       PREOPERATIVE DIAGNOSIS/INDICAT pattern were normal.  Transverse colon: The mucosa and vascular pattern were normal.  Descending colon: The mucosa and vascular pattern were normal.  Sigmoid colon: Diverticulosis. Rectum:  The mucosa and vascular pattern were normal. Retroflexion reveale

## 2021-12-13 DIAGNOSIS — E78.5 HYPERLIPIDEMIA, UNSPECIFIED HYPERLIPIDEMIA TYPE: Primary | ICD-10-CM

## 2022-01-03 DIAGNOSIS — I10 ESSENTIAL HYPERTENSION, BENIGN: ICD-10-CM

## 2022-01-03 DIAGNOSIS — I25.10 CORONARY ARTERY DISEASE INVOLVING NATIVE HEART WITHOUT ANGINA PECTORIS, UNSPECIFIED VESSEL OR LESION TYPE: ICD-10-CM

## 2022-01-03 DIAGNOSIS — I71.40 ABDOMINAL AORTIC ANEURYSM (AAA) WITHOUT RUPTURE (HCC): ICD-10-CM

## 2022-01-03 DIAGNOSIS — E78.5 HYPERLIPIDEMIA, UNSPECIFIED HYPERLIPIDEMIA TYPE: ICD-10-CM

## 2022-01-04 RX ORDER — ATORVASTATIN CALCIUM 40 MG/1
40 TABLET, FILM COATED ORAL DAILY
Qty: 90 TABLET | Refills: 0 | Status: SHIPPED | OUTPATIENT
Start: 2022-01-04 | End: 2022-04-13

## 2022-01-04 RX ORDER — METOPROLOL SUCCINATE 50 MG/1
TABLET, EXTENDED RELEASE ORAL
Qty: 90 TABLET | Refills: 0 | Status: SHIPPED | OUTPATIENT
Start: 2022-01-04 | End: 2022-04-13

## 2022-01-12 NOTE — PROGRESS NOTES
[facility] AND [facility]  [facility] Cardiology Progress Note    Kandy Dies Patient Status:  Inpatient    1955 MRN [id number]   [facility] 6NE-A Attending Yannick Burnett MD   Hosp Day # 1 PCP Pravin Galo MD     74 yo male with enlar mesh     Family History   Problem Relation Age of Onset   • Colon Cancer Father 79   • Cancer Father         started as colon cancer-moved to bone then tumor on kidney   • Cancer Mother         breast cancer.  mets \"all over\"   • Other (Other) Son Bladder distension

## 2022-02-21 ENCOUNTER — OFFICE VISIT (OUTPATIENT)
Dept: FAMILY MEDICINE CLINIC | Facility: CLINIC | Age: 67
End: 2022-02-21
Payer: COMMERCIAL

## 2022-02-21 VITALS
BODY MASS INDEX: 27.16 KG/M2 | DIASTOLIC BLOOD PRESSURE: 80 MMHG | WEIGHT: 194 LBS | TEMPERATURE: 97 F | SYSTOLIC BLOOD PRESSURE: 136 MMHG | HEART RATE: 64 BPM | HEIGHT: 71 IN | RESPIRATION RATE: 16 BRPM

## 2022-02-21 DIAGNOSIS — I71.4 ABDOMINAL AORTIC ANEURYSM (AAA) WITHOUT RUPTURE (HCC): ICD-10-CM

## 2022-02-21 DIAGNOSIS — I25.10 CORONARY ARTERY DISEASE INVOLVING NATIVE HEART WITHOUT ANGINA PECTORIS, UNSPECIFIED VESSEL OR LESION TYPE: Primary | ICD-10-CM

## 2022-02-21 DIAGNOSIS — E78.5 HYPERLIPIDEMIA, UNSPECIFIED HYPERLIPIDEMIA TYPE: ICD-10-CM

## 2022-02-21 DIAGNOSIS — I10 ESSENTIAL HYPERTENSION, BENIGN: ICD-10-CM

## 2022-02-21 PROCEDURE — 3075F SYST BP GE 130 - 139MM HG: CPT | Performed by: FAMILY MEDICINE

## 2022-02-21 PROCEDURE — 3008F BODY MASS INDEX DOCD: CPT | Performed by: FAMILY MEDICINE

## 2022-02-21 PROCEDURE — 3079F DIAST BP 80-89 MM HG: CPT | Performed by: FAMILY MEDICINE

## 2022-02-21 PROCEDURE — 99214 OFFICE O/P EST MOD 30 MIN: CPT | Performed by: FAMILY MEDICINE

## 2022-02-22 ENCOUNTER — NURSE ONLY (OUTPATIENT)
Dept: FAMILY MEDICINE CLINIC | Facility: CLINIC | Age: 67
End: 2022-02-22
Payer: COMMERCIAL

## 2022-02-22 DIAGNOSIS — Z23 NEED FOR VACCINATION: Primary | ICD-10-CM

## 2022-02-22 PROCEDURE — 90750 HZV VACC RECOMBINANT IM: CPT | Performed by: FAMILY MEDICINE

## 2022-02-22 PROCEDURE — 90471 IMMUNIZATION ADMIN: CPT | Performed by: FAMILY MEDICINE

## 2022-02-22 PROCEDURE — 90732 PPSV23 VACC 2 YRS+ SUBQ/IM: CPT | Performed by: FAMILY MEDICINE

## 2022-02-22 PROCEDURE — 90472 IMMUNIZATION ADMIN EACH ADD: CPT | Performed by: FAMILY MEDICINE

## 2022-02-24 ENCOUNTER — TELEPHONE (OUTPATIENT)
Dept: FAMILY MEDICINE CLINIC | Facility: CLINIC | Age: 67
End: 2022-02-24

## 2022-02-24 RX ORDER — SILDENAFIL 50 MG/1
50 TABLET, FILM COATED ORAL
Qty: 15 TABLET | Refills: 3 | Status: SHIPPED | OUTPATIENT
Start: 2022-02-24

## 2022-02-24 RX ORDER — SILDENAFIL 50 MG/1
50 TABLET, FILM COATED ORAL
Qty: 15 TABLET | Refills: 3 | Status: SHIPPED | OUTPATIENT
Start: 2022-02-24 | End: 2022-02-24

## 2022-02-24 NOTE — TELEPHONE ENCOUNTER
Pt needs his sildenafil sent to [facility] instead of [facility] (originally sent there). Rx re-sent. I called [facility] and cancelled original rx.

## 2022-04-13 RX ORDER — AMOXICILLIN AND CLAVULANATE POTASSIUM 875; 125 MG/1; MG/1
1 TABLET, FILM COATED ORAL 2 TIMES DAILY
Qty: 20 TABLET | Refills: 0 | Status: SHIPPED | OUTPATIENT
Start: 2022-04-13 | End: 2022-04-23

## 2022-04-13 RX ORDER — ATORVASTATIN CALCIUM 40 MG/1
40 TABLET, FILM COATED ORAL DAILY
Qty: 90 TABLET | Refills: 1 | Status: SHIPPED | OUTPATIENT
Start: 2022-04-13

## 2022-04-13 RX ORDER — METOPROLOL SUCCINATE 50 MG/1
TABLET, EXTENDED RELEASE ORAL
Qty: 90 TABLET | Refills: 1 | Status: SHIPPED | OUTPATIENT
Start: 2022-04-13

## 2022-07-11 ENCOUNTER — TELEPHONE (OUTPATIENT)
Dept: FAMILY MEDICINE CLINIC | Facility: CLINIC | Age: 67
End: 2022-07-11

## 2022-07-11 DIAGNOSIS — I10 ESSENTIAL HYPERTENSION, BENIGN: ICD-10-CM

## 2022-07-11 DIAGNOSIS — I25.10 CORONARY ARTERY DISEASE INVOLVING NATIVE HEART WITHOUT ANGINA PECTORIS, UNSPECIFIED VESSEL OR LESION TYPE: ICD-10-CM

## 2022-07-11 DIAGNOSIS — I71.4 ABDOMINAL AORTIC ANEURYSM (AAA) WITHOUT RUPTURE (HCC): ICD-10-CM

## 2022-07-11 DIAGNOSIS — E78.5 HYPERLIPIDEMIA, UNSPECIFIED HYPERLIPIDEMIA TYPE: ICD-10-CM

## 2022-07-11 RX ORDER — METOPROLOL SUCCINATE 50 MG/1
TABLET, EXTENDED RELEASE ORAL
Qty: 90 TABLET | Refills: 0 | Status: SHIPPED | OUTPATIENT
Start: 2022-07-11

## 2022-07-11 RX ORDER — ATORVASTATIN CALCIUM 40 MG/1
40 TABLET, FILM COATED ORAL DAILY
Qty: 90 TABLET | Refills: 0 | Status: SHIPPED | OUTPATIENT
Start: 2022-07-11

## 2022-07-11 NOTE — TELEPHONE ENCOUNTER
PA request rec'd for celebrex 200 mg bid. Goodrx. com shows #60 for less than $7.00  I called pt to discuss. He said he does not need this rx for at least a month. Mor Mirza he is newer to [employer] and someone Alex Ren told him that this would run through [employer] with no charge to him? He said he has [employer] rodolfo if he ends up needing a coupon at some point. PA form shredded.

## 2022-07-13 LAB
ALBUMIN/GLOBULIN RATIO: 1.8 (CALC) (ref 1–2.5)
ALBUMIN: 4.1 G/DL (ref 3.6–5.1)
ALKALINE PHOSPHATASE: 49 U/L (ref 35–144)
ALT: 24 U/L (ref 9–46)
AST: 29 U/L (ref 10–35)
BILIRUBIN, TOTAL: 1.1 MG/DL (ref 0.2–1.2)
BUN: 14 MG/DL (ref 7–25)
CALCIUM: 9.1 MG/DL (ref 8.6–10.3)
CARBON DIOXIDE: 34 MMOL/L (ref 20–32)
CHLORIDE: 103 MMOL/L (ref 98–110)
CHOL/HDLC RATIO: 3.8 (CALC)
CHOLESTEROL, TOTAL: 160 MG/DL
CREATININE: 1.09 MG/DL (ref 0.7–1.35)
EGFR: 75 ML/MIN/1.73M2
GLOBULIN: 2.3 G/DL (CALC) (ref 1.9–3.7)
GLUCOSE: 95 MG/DL (ref 65–99)
HDL CHOLESTEROL: 42 MG/DL
LDL-CHOLESTEROL: 92 MG/DL (CALC)
NON-HDL CHOLESTEROL: 118 MG/DL (CALC)
POTASSIUM: 4.7 MMOL/L (ref 3.5–5.3)
PROTEIN, TOTAL: 6.4 G/DL (ref 6.1–8.1)
SODIUM: 139 MMOL/L (ref 135–146)
TRIGLYCERIDES: 164 MG/DL

## 2022-07-15 RX ORDER — ROSUVASTATIN CALCIUM 40 MG/1
40 TABLET, COATED ORAL NIGHTLY
Qty: 90 TABLET | Refills: 0 | Status: SHIPPED | OUTPATIENT
Start: 2022-07-15

## 2022-07-22 ENCOUNTER — MED REC SCAN ONLY (OUTPATIENT)
Dept: FAMILY MEDICINE CLINIC | Facility: CLINIC | Age: 67
End: 2022-07-22

## 2022-07-25 ENCOUNTER — ORDER TRANSCRIPTION (OUTPATIENT)
Dept: ADMINISTRATIVE | Facility: HOSPITAL | Age: 67
End: 2022-07-25

## 2022-07-25 DIAGNOSIS — I71.4 ABDOMINAL AORTIC ANEURYSM (AAA) WITHOUT RUPTURE (HCC): Primary | ICD-10-CM

## 2022-08-09 ENCOUNTER — APPOINTMENT (OUTPATIENT)
Dept: GENERAL RADIOLOGY | Age: 67
End: 2022-08-09
Attending: NURSE PRACTITIONER
Payer: MEDICARE

## 2022-08-09 ENCOUNTER — HOSPITAL ENCOUNTER (OUTPATIENT)
Age: 67
Discharge: HOME | End: 2022-08-09
Payer: MEDICARE

## 2022-08-09 VITALS
RESPIRATION RATE: 18 BRPM | WEIGHT: 195 LBS | HEART RATE: 75 BPM | HEIGHT: 71 IN | OXYGEN SATURATION: 98 % | BODY MASS INDEX: 27.3 KG/M2 | TEMPERATURE: 97 F | DIASTOLIC BLOOD PRESSURE: 94 MMHG | SYSTOLIC BLOOD PRESSURE: 147 MMHG

## 2022-08-09 DIAGNOSIS — S99.912A INJURY OF LEFT ANKLE, INITIAL ENCOUNTER: Primary | ICD-10-CM

## 2022-08-09 PROCEDURE — 99213 OFFICE O/P EST LOW 20 MIN: CPT

## 2022-08-09 PROCEDURE — 73610 X-RAY EXAM OF ANKLE: CPT | Performed by: NURSE PRACTITIONER

## 2022-08-09 NOTE — ED INITIAL ASSESSMENT (HPI)
Pt aox4. Pt c/o left lateral ankle pain and swelling. Pt states was walking out of [place] and rolled left ankle yesterday. Upon Rn assessment no bruising noted. + left pedal pulse.

## 2022-08-15 ENCOUNTER — HOSPITAL ENCOUNTER (OUTPATIENT)
Dept: CT IMAGING | Facility: HOSPITAL | Age: 67
Discharge: HOME | End: 2022-08-15
Attending: INTERNAL MEDICINE
Payer: MEDICARE

## 2022-08-15 DIAGNOSIS — I71.4 ABDOMINAL AORTIC ANEURYSM (AAA) WITHOUT RUPTURE (HCC): ICD-10-CM

## 2022-08-15 LAB
CREAT BLD-MCNC: 1 MG/DL
GFR SERPLBLD BASED ON 1.73 SQ M-ARVRAT: 83 ML/MIN/1.73M2 (ref 60–?)

## 2022-08-15 PROCEDURE — 82565 ASSAY OF CREATININE: CPT

## 2022-08-15 PROCEDURE — 75635 CT ANGIO ABDOMINAL ARTERIES: CPT | Performed by: INTERNAL MEDICINE

## 2022-08-15 RX ORDER — IOHEXOL 350 MG/ML
100 INJECTION, SOLUTION INTRAVENOUS
Status: COMPLETED | OUTPATIENT
Start: 2022-08-15 | End: 2022-08-15

## 2022-08-15 RX ADMIN — IOHEXOL 100 ML: 350 INJECTION, SOLUTION INTRAVENOUS at 09:16:00

## 2022-08-25 ENCOUNTER — TELEPHONE (OUTPATIENT)
Dept: FAMILY MEDICINE CLINIC | Facility: CLINIC | Age: 67
End: 2022-08-25

## 2022-08-25 RX ORDER — NIRMATRELVIR AND RITONAVIR 300-100 MG
KIT ORAL
Qty: 30 TABLET | Refills: 0 | Status: SHIPPED | OUTPATIENT
Start: 2022-08-25 | End: 2022-08-30

## 2022-08-25 NOTE — TELEPHONE ENCOUNTER
Pt reports + covid this am.  101 fever  OK per Dr Veronica Fall to send paxlovid. Needs to hold statin during duration of rx and 2 days after. He is aware. Discussed supportive care.  Aware to ER if CP or SOB  I have r/s'd his px (was set up for this Monday)

## 2022-09-06 DIAGNOSIS — M15.9 PRIMARY OSTEOARTHRITIS INVOLVING MULTIPLE JOINTS: Primary | ICD-10-CM

## 2022-09-07 RX ORDER — CELECOXIB 200 MG/1
200 CAPSULE ORAL DAILY
Qty: 90 CAPSULE | Refills: 1 | Status: SHIPPED | OUTPATIENT
Start: 2022-09-07

## 2022-09-19 ENCOUNTER — OFFICE VISIT (OUTPATIENT)
Dept: FAMILY MEDICINE CLINIC | Facility: CLINIC | Age: 67
End: 2022-09-19

## 2022-09-19 VITALS
WEIGHT: 195 LBS | SYSTOLIC BLOOD PRESSURE: 138 MMHG | RESPIRATION RATE: 16 BRPM | BODY MASS INDEX: 27.3 KG/M2 | DIASTOLIC BLOOD PRESSURE: 74 MMHG | TEMPERATURE: 98 F | HEART RATE: 68 BPM | HEIGHT: 71 IN

## 2022-09-19 DIAGNOSIS — I71.4 ABDOMINAL AORTIC ANEURYSM (AAA) WITHOUT RUPTURE: ICD-10-CM

## 2022-09-19 DIAGNOSIS — Z11.59 NEED FOR HEPATITIS C SCREENING TEST: ICD-10-CM

## 2022-09-19 DIAGNOSIS — N52.9 ERECTILE DYSFUNCTION, UNSPECIFIED ERECTILE DYSFUNCTION TYPE: ICD-10-CM

## 2022-09-19 DIAGNOSIS — E78.5 HYPERLIPIDEMIA, UNSPECIFIED HYPERLIPIDEMIA TYPE: ICD-10-CM

## 2022-09-19 DIAGNOSIS — M15.9 PRIMARY OSTEOARTHRITIS INVOLVING MULTIPLE JOINTS: ICD-10-CM

## 2022-09-19 DIAGNOSIS — Z00.00 ENCOUNTER FOR ANNUAL HEALTH EXAMINATION: Primary | ICD-10-CM

## 2022-09-19 DIAGNOSIS — R35.1 BENIGN PROSTATIC HYPERPLASIA WITH NOCTURIA: ICD-10-CM

## 2022-09-19 DIAGNOSIS — K21.9 GASTROESOPHAGEAL REFLUX DISEASE WITHOUT ESOPHAGITIS: ICD-10-CM

## 2022-09-19 DIAGNOSIS — I10 ESSENTIAL HYPERTENSION, BENIGN: ICD-10-CM

## 2022-09-19 DIAGNOSIS — N40.1 BENIGN PROSTATIC HYPERPLASIA WITH NOCTURIA: ICD-10-CM

## 2022-09-19 DIAGNOSIS — I25.10 CORONARY ARTERY DISEASE INVOLVING NATIVE HEART WITHOUT ANGINA PECTORIS, UNSPECIFIED VESSEL OR LESION TYPE: ICD-10-CM

## 2022-10-17 RX ORDER — ROSUVASTATIN CALCIUM 40 MG/1
TABLET, COATED ORAL
Qty: 90 TABLET | Refills: 0 | Status: SHIPPED | OUTPATIENT
Start: 2022-10-17

## 2022-10-22 LAB
ABSOLUTE BASOPHILS: 23 CELLS/UL (ref 0–200)
ABSOLUTE EOSINOPHILS: 251 CELLS/UL (ref 15–500)
ABSOLUTE LYMPHOCYTES: 986 CELLS/UL (ref 850–3900)
ABSOLUTE MONOCYTES: 644 CELLS/UL (ref 200–950)
ABSOLUTE NEUTROPHILS: 3796 CELLS/UL (ref 1500–7800)
ALBUMIN/GLOBULIN RATIO: 1.5 (CALC) (ref 1–2.5)
ALBUMIN: 4 G/DL (ref 3.6–5.1)
ALKALINE PHOSPHATASE: 47 U/L (ref 35–144)
ALT: 26 U/L (ref 9–46)
AST: 28 U/L (ref 10–35)
BASOPHILS: 0.4 %
BILIRUBIN, TOTAL: 1.2 MG/DL (ref 0.2–1.2)
BUN: 14 MG/DL (ref 7–25)
CALCIUM: 9.4 MG/DL (ref 8.6–10.3)
CARBON DIOXIDE: 32 MMOL/L (ref 20–32)
CHLORIDE: 104 MMOL/L (ref 98–110)
CHOL/HDLC RATIO: 3 (CALC)
CHOLESTEROL, TOTAL: 129 MG/DL
CREATININE: 1.04 MG/DL (ref 0.7–1.35)
EGFR: 79 ML/MIN/1.73M2
EOSINOPHILS: 4.4 %
GLOBULIN: 2.7 G/DL (CALC) (ref 1.9–3.7)
GLUCOSE: 95 MG/DL (ref 65–99)
HDL CHOLESTEROL: 43 MG/DL
HEMATOCRIT: 48.1 % (ref 38.5–50)
HEMOGLOBIN: 15.9 G/DL (ref 13.2–17.1)
LDL-CHOLESTEROL: 65 MG/DL (CALC)
LYMPHOCYTES: 17.3 %
MCH: 29.9 PG (ref 27–33)
MCHC: 33.1 G/DL (ref 32–36)
MCV: 90.6 FL (ref 80–100)
MONOCYTES: 11.3 %
MPV: 10.9 FL (ref 7.5–12.5)
NEUTROPHILS: 66.6 %
NON-HDL CHOLESTEROL: 86 MG/DL (CALC)
PLATELET COUNT: 163 THOUSAND/UL (ref 140–400)
POTASSIUM: 4.7 MMOL/L (ref 3.5–5.3)
PROTEIN, TOTAL: 6.7 G/DL (ref 6.1–8.1)
PSA, TOTAL: 0.92 NG/ML
RDW: 12.1 % (ref 11–15)
RED BLOOD CELL COUNT: 5.31 MILLION/UL (ref 4.2–5.8)
SIGNAL TO CUT-OFF: 0.05
SODIUM: 141 MMOL/L (ref 135–146)
TRIGLYCERIDES: 125 MG/DL
WHITE BLOOD CELL COUNT: 5.7 THOUSAND/UL (ref 3.8–10.8)

## 2022-12-15 DIAGNOSIS — I25.10 CORONARY ARTERY DISEASE INVOLVING NATIVE HEART WITHOUT ANGINA PECTORIS, UNSPECIFIED VESSEL OR LESION TYPE: ICD-10-CM

## 2022-12-15 DIAGNOSIS — I10 ESSENTIAL HYPERTENSION, BENIGN: ICD-10-CM

## 2022-12-15 DIAGNOSIS — I71.40 ABDOMINAL AORTIC ANEURYSM (AAA) WITHOUT RUPTURE: ICD-10-CM

## 2022-12-15 RX ORDER — METOPROLOL SUCCINATE 50 MG/1
TABLET, EXTENDED RELEASE ORAL
Qty: 90 TABLET | Refills: 0 | Status: SHIPPED | OUTPATIENT
Start: 2022-12-15

## 2023-01-10 ENCOUNTER — TELEPHONE (OUTPATIENT)
Dept: FAMILY MEDICINE CLINIC | Facility: CLINIC | Age: 68
End: 2023-01-10

## 2023-01-10 DIAGNOSIS — I71.40 ABDOMINAL AORTIC ANEURYSM (AAA) WITHOUT RUPTURE: ICD-10-CM

## 2023-01-10 DIAGNOSIS — I10 ESSENTIAL HYPERTENSION, BENIGN: ICD-10-CM

## 2023-01-10 DIAGNOSIS — I25.10 CORONARY ARTERY DISEASE INVOLVING NATIVE HEART WITHOUT ANGINA PECTORIS, UNSPECIFIED VESSEL OR LESION TYPE: ICD-10-CM

## 2023-01-10 NOTE — TELEPHONE ENCOUNTER
Migdalia Martinez NP from [facility] leaving a message:    B/p 147/90, 143/99, asymptomatic with no chest pain or headaches. Right lower extremity PVD screen test abnormal .81 per recommendation. Pt had a prior scheduled appt for 2/23/23 for lab work results. Offered sooner appt for 1/18/23 at 3 PM but declined by the pt. Pt stated he believes his blood pressure was elevated since he had been at the gym doing work outs. Pt will start doing home monitoring of his blood pressures and bring to the Feb. appt.

## 2023-01-12 ENCOUNTER — OFFICE VISIT (OUTPATIENT)
Dept: FAMILY MEDICINE CLINIC | Facility: CLINIC | Age: 68
End: 2023-01-12
Payer: COMMERCIAL

## 2023-01-12 VITALS
WEIGHT: 195 LBS | DIASTOLIC BLOOD PRESSURE: 96 MMHG | SYSTOLIC BLOOD PRESSURE: 148 MMHG | BODY MASS INDEX: 27.3 KG/M2 | HEIGHT: 71 IN | HEART RATE: 87 BPM | RESPIRATION RATE: 18 BRPM | TEMPERATURE: 97 F | OXYGEN SATURATION: 98 %

## 2023-01-12 DIAGNOSIS — R05.1 ACUTE COUGH: Primary | ICD-10-CM

## 2023-01-12 PROCEDURE — 3077F SYST BP >= 140 MM HG: CPT | Performed by: NURSE PRACTITIONER

## 2023-01-12 PROCEDURE — 3008F BODY MASS INDEX DOCD: CPT | Performed by: NURSE PRACTITIONER

## 2023-01-12 PROCEDURE — 99213 OFFICE O/P EST LOW 20 MIN: CPT | Performed by: NURSE PRACTITIONER

## 2023-01-12 PROCEDURE — 3080F DIAST BP >= 90 MM HG: CPT | Performed by: NURSE PRACTITIONER

## 2023-01-12 RX ORDER — BENZONATATE 200 MG/1
200 CAPSULE ORAL 3 TIMES DAILY PRN
Qty: 30 CAPSULE | Refills: 0 | Status: SHIPPED | OUTPATIENT
Start: 2023-01-12 | End: 2023-01-22

## 2023-02-20 DIAGNOSIS — E78.5 HYPERLIPIDEMIA, UNSPECIFIED HYPERLIPIDEMIA TYPE: Primary | ICD-10-CM

## 2023-02-21 ENCOUNTER — MED REC SCAN ONLY (OUTPATIENT)
Dept: FAMILY MEDICINE CLINIC | Facility: CLINIC | Age: 68
End: 2023-02-21

## 2023-02-23 ENCOUNTER — OFFICE VISIT (OUTPATIENT)
Dept: FAMILY MEDICINE CLINIC | Facility: CLINIC | Age: 68
End: 2023-02-23
Payer: COMMERCIAL

## 2023-02-23 VITALS
HEIGHT: 71 IN | HEART RATE: 68 BPM | SYSTOLIC BLOOD PRESSURE: 138 MMHG | TEMPERATURE: 98 F | WEIGHT: 202 LBS | DIASTOLIC BLOOD PRESSURE: 72 MMHG | RESPIRATION RATE: 16 BRPM | BODY MASS INDEX: 28.28 KG/M2

## 2023-02-23 DIAGNOSIS — I25.10 CORONARY ARTERY DISEASE INVOLVING NATIVE HEART WITHOUT ANGINA PECTORIS, UNSPECIFIED VESSEL OR LESION TYPE: Primary | ICD-10-CM

## 2023-02-23 DIAGNOSIS — Z98.890 STATUS POST AAA (ABDOMINAL AORTIC ANEURYSM) REPAIR: ICD-10-CM

## 2023-02-23 DIAGNOSIS — Z86.79 STATUS POST AAA (ABDOMINAL AORTIC ANEURYSM) REPAIR: ICD-10-CM

## 2023-02-23 DIAGNOSIS — Z23 NEED FOR VACCINATION: ICD-10-CM

## 2023-02-23 DIAGNOSIS — E78.00 PURE HYPERCHOLESTEROLEMIA: ICD-10-CM

## 2023-02-23 DIAGNOSIS — I10 ESSENTIAL HYPERTENSION, BENIGN: ICD-10-CM

## 2023-02-23 DIAGNOSIS — I71.40 ABDOMINAL AORTIC ANEURYSM (AAA) WITHOUT RUPTURE, UNSPECIFIED PART (HCC): ICD-10-CM

## 2023-02-23 DIAGNOSIS — Z80.0 FAMILY HISTORY OF COLON CANCER REQUIRING SCREENING COLONOSCOPY: ICD-10-CM

## 2023-02-23 PROCEDURE — 99214 OFFICE O/P EST MOD 30 MIN: CPT | Performed by: FAMILY MEDICINE

## 2023-02-23 PROCEDURE — 3078F DIAST BP <80 MM HG: CPT | Performed by: FAMILY MEDICINE

## 2023-02-23 PROCEDURE — 90471 IMMUNIZATION ADMIN: CPT | Performed by: FAMILY MEDICINE

## 2023-02-23 PROCEDURE — 3075F SYST BP GE 130 - 139MM HG: CPT | Performed by: FAMILY MEDICINE

## 2023-02-23 PROCEDURE — 90677 PCV20 VACCINE IM: CPT | Performed by: FAMILY MEDICINE

## 2023-02-23 PROCEDURE — 3008F BODY MASS INDEX DOCD: CPT | Performed by: FAMILY MEDICINE

## 2023-03-03 DIAGNOSIS — M15.9 PRIMARY OSTEOARTHRITIS INVOLVING MULTIPLE JOINTS: ICD-10-CM

## 2023-03-06 RX ORDER — CELECOXIB 200 MG/1
CAPSULE ORAL
Qty: 90 CAPSULE | Refills: 1 | Status: SHIPPED | OUTPATIENT
Start: 2023-03-06

## 2023-03-11 DIAGNOSIS — I71.40 ABDOMINAL AORTIC ANEURYSM (AAA) WITHOUT RUPTURE (HCC): ICD-10-CM

## 2023-03-11 DIAGNOSIS — I25.10 CORONARY ARTERY DISEASE INVOLVING NATIVE HEART WITHOUT ANGINA PECTORIS, UNSPECIFIED VESSEL OR LESION TYPE: ICD-10-CM

## 2023-03-11 DIAGNOSIS — I10 ESSENTIAL HYPERTENSION, BENIGN: ICD-10-CM

## 2023-03-13 RX ORDER — METOPROLOL SUCCINATE 50 MG/1
TABLET, EXTENDED RELEASE ORAL
Qty: 90 TABLET | Refills: 0 | Status: SHIPPED | OUTPATIENT
Start: 2023-03-13

## 2023-04-03 RX ORDER — ROSUVASTATIN CALCIUM 40 MG/1
TABLET, COATED ORAL
Qty: 90 TABLET | Refills: 1 | Status: SHIPPED | OUTPATIENT
Start: 2023-04-03

## 2023-04-11 NOTE — ED PROVIDER NOTES
Fax received from eye care provider pt has not been seen since 2019. Pt still due. Immunization's updated.   Patient Seen in: [facility]      History   Patient presents with:   Body ache and/or chills  Testing  Headache    Stated Complaint: chills fever / exposure to covid    41-year-old male who presents to the immediate care with expo left inguinal hernia with mesh   • OTHER SURGICAL HISTORY  08/20/2019    Robotic-assisted repair right inguinal hernia with mesh   • XI ROBOT-ASSISTED LAPAROSCOPIC INGUINAL HERNIA REPAIR Left 7/10/2020    Performed by Gerardo Lamar MD at [address] [place] [address]   • X There is no gallop rub or murmur. ABDOMEN: Abdomen is soft, nontender. Patient has normal bowel sounds. There is no abdominal distention. SKIN: There is no rash. There is no edema. There is no diaphoresis.   NEURO: The patient is awake, alert, and pedro

## 2023-04-17 NOTE — PROGRESS NOTES
[facility]ITAL[facility]  Progress note     Sandy Hancock Patient Status:  Inpatient    1955 MRN [id number]   [facility] 6NE-A Attending John Coto MD   [facility] Day # 4 PCP Mary Briggs MD     Reason for consult: med mgt    Request HTN  1. Controlled   3. Thrombocytopenia  1. Monitor   4. Leukocytosis  1. Monitor  5. Stress hyperglycemia  1. Per endocrine   6. Constipation  1. Continue bowel regimen   7. Disposition  1.  May d/c from hospitalist standpoint     Quality:  · DVT Prophyla 1

## 2023-05-31 DIAGNOSIS — I71.40 ABDOMINAL AORTIC ANEURYSM (AAA) WITHOUT RUPTURE (HCC): ICD-10-CM

## 2023-05-31 DIAGNOSIS — I10 ESSENTIAL HYPERTENSION, BENIGN: ICD-10-CM

## 2023-05-31 DIAGNOSIS — I25.10 CORONARY ARTERY DISEASE INVOLVING NATIVE HEART WITHOUT ANGINA PECTORIS, UNSPECIFIED VESSEL OR LESION TYPE: ICD-10-CM

## 2023-05-31 RX ORDER — METOPROLOL SUCCINATE 50 MG/1
TABLET, EXTENDED RELEASE ORAL
Qty: 90 TABLET | Refills: 0 | Status: SHIPPED | OUTPATIENT
Start: 2023-05-31

## 2023-06-02 DIAGNOSIS — I25.10 CORONARY ARTERY DISEASE INVOLVING NATIVE HEART WITHOUT ANGINA PECTORIS, UNSPECIFIED VESSEL OR LESION TYPE: ICD-10-CM

## 2023-06-02 DIAGNOSIS — I10 ESSENTIAL HYPERTENSION, BENIGN: Primary | ICD-10-CM

## 2023-07-12 ENCOUNTER — MED REC SCAN ONLY (OUTPATIENT)
Dept: FAMILY MEDICINE CLINIC | Facility: CLINIC | Age: 68
End: 2023-07-12

## 2023-08-16 ENCOUNTER — HOSPITAL ENCOUNTER (OUTPATIENT)
Dept: CT IMAGING | Facility: HOSPITAL | Age: 68
Discharge: HOME | End: 2023-08-16
Attending: INTERNAL MEDICINE
Payer: MEDICARE

## 2023-08-16 DIAGNOSIS — I25.10 CAD IN NATIVE ARTERY: ICD-10-CM

## 2023-08-16 DIAGNOSIS — I71.40 ABDOMINAL AORTIC ANEURYSM WITHOUT RUPTURE (HCC): ICD-10-CM

## 2023-08-16 LAB
CREAT BLD-MCNC: 1.2 MG/DL
EGFRCR SERPLBLD CKD-EPI 2021: 66 ML/MIN/1.73M2 (ref 60–?)

## 2023-08-16 PROCEDURE — 82565 ASSAY OF CREATININE: CPT

## 2023-08-16 PROCEDURE — 74175 CTA ABDOMEN W/CONTRAST: CPT | Performed by: INTERNAL MEDICINE

## 2023-08-24 ENCOUNTER — MED REC SCAN ONLY (OUTPATIENT)
Dept: FAMILY MEDICINE CLINIC | Facility: CLINIC | Age: 68
End: 2023-08-24

## 2023-08-24 LAB
ALBUMIN/GLOBULIN RATIO: 1.6 (CALC) (ref 1–2.5)
ALBUMIN: 4.3 G/DL (ref 3.6–5.1)
ALKALINE PHOSPHATASE: 49 U/L (ref 35–144)
ALT: 16 U/L (ref 9–46)
AST: 24 U/L (ref 10–35)
BILIRUBIN, TOTAL: 0.9 MG/DL (ref 0.2–1.2)
BUN: 12 MG/DL (ref 7–25)
CALCIUM: 9.5 MG/DL (ref 8.6–10.3)
CARBON DIOXIDE: 30 MMOL/L (ref 20–32)
CHLORIDE: 103 MMOL/L (ref 98–110)
CHOL/HDLC RATIO: 2.8 (CALC)
CHOLESTEROL, TOTAL: 131 MG/DL
CREATININE: 0.99 MG/DL (ref 0.7–1.35)
EGFR: 83 ML/MIN/1.73M2
GLOBULIN: 2.7 G/DL (CALC) (ref 1.9–3.7)
GLUCOSE: 95 MG/DL (ref 65–99)
HDL CHOLESTEROL: 47 MG/DL
LDL-CHOLESTEROL: 65 MG/DL (CALC)
MAGNESIUM: 2.3 MG/DL (ref 1.5–2.5)
NON-HDL CHOLESTEROL: 84 MG/DL (CALC)
POTASSIUM: 4.8 MMOL/L (ref 3.5–5.3)
PROTEIN, TOTAL: 7 G/DL (ref 6.1–8.1)
SODIUM: 140 MMOL/L (ref 135–146)
TRIGLYCERIDES: 104 MG/DL

## 2023-08-27 DIAGNOSIS — I71.40 ABDOMINAL AORTIC ANEURYSM (AAA) WITHOUT RUPTURE (HCC): ICD-10-CM

## 2023-08-27 DIAGNOSIS — I10 ESSENTIAL HYPERTENSION, BENIGN: ICD-10-CM

## 2023-08-27 DIAGNOSIS — I25.10 CORONARY ARTERY DISEASE INVOLVING NATIVE HEART WITHOUT ANGINA PECTORIS, UNSPECIFIED VESSEL OR LESION TYPE: ICD-10-CM

## 2023-08-28 DIAGNOSIS — M15.9 PRIMARY OSTEOARTHRITIS INVOLVING MULTIPLE JOINTS: ICD-10-CM

## 2023-08-28 RX ORDER — METOPROLOL SUCCINATE 50 MG/1
TABLET, EXTENDED RELEASE ORAL
Qty: 90 TABLET | Refills: 0 | Status: SHIPPED | OUTPATIENT
Start: 2023-08-28

## 2023-08-31 ENCOUNTER — OFFICE VISIT (OUTPATIENT)
Dept: FAMILY MEDICINE CLINIC | Facility: CLINIC | Age: 68
End: 2023-08-31
Payer: MEDICARE

## 2023-08-31 VITALS
TEMPERATURE: 98 F | BODY MASS INDEX: 27.72 KG/M2 | HEART RATE: 76 BPM | DIASTOLIC BLOOD PRESSURE: 82 MMHG | SYSTOLIC BLOOD PRESSURE: 138 MMHG | RESPIRATION RATE: 16 BRPM | WEIGHT: 198 LBS | HEIGHT: 71 IN

## 2023-08-31 DIAGNOSIS — R35.1 BENIGN PROSTATIC HYPERPLASIA WITH NOCTURIA: ICD-10-CM

## 2023-08-31 DIAGNOSIS — E78.00 PURE HYPERCHOLESTEROLEMIA: ICD-10-CM

## 2023-08-31 DIAGNOSIS — I10 ESSENTIAL HYPERTENSION, BENIGN: ICD-10-CM

## 2023-08-31 DIAGNOSIS — N40.1 BENIGN PROSTATIC HYPERPLASIA WITH NOCTURIA: ICD-10-CM

## 2023-08-31 DIAGNOSIS — Z00.00 ENCOUNTER FOR ANNUAL HEALTH EXAMINATION: Primary | ICD-10-CM

## 2023-08-31 DIAGNOSIS — I71.41 PARARENAL ABDOMINAL AORTIC ANEURYSM (AAA) WITHOUT RUPTURE (HCC): ICD-10-CM

## 2023-08-31 DIAGNOSIS — I25.10 CORONARY ARTERY DISEASE INVOLVING NATIVE HEART WITHOUT ANGINA PECTORIS, UNSPECIFIED VESSEL OR LESION TYPE: ICD-10-CM

## 2023-08-31 PROCEDURE — 1159F MED LIST DOCD IN RCRD: CPT | Performed by: FAMILY MEDICINE

## 2023-08-31 PROCEDURE — 1160F RVW MEDS BY RX/DR IN RCRD: CPT | Performed by: FAMILY MEDICINE

## 2023-08-31 PROCEDURE — 96160 PT-FOCUSED HLTH RISK ASSMT: CPT | Performed by: FAMILY MEDICINE

## 2023-08-31 PROCEDURE — 1170F FXNL STATUS ASSESSED: CPT | Performed by: FAMILY MEDICINE

## 2023-08-31 PROCEDURE — 3008F BODY MASS INDEX DOCD: CPT | Performed by: FAMILY MEDICINE

## 2023-08-31 PROCEDURE — 99213 OFFICE O/P EST LOW 20 MIN: CPT | Performed by: FAMILY MEDICINE

## 2023-08-31 PROCEDURE — 3079F DIAST BP 80-89 MM HG: CPT | Performed by: FAMILY MEDICINE

## 2023-08-31 PROCEDURE — G0439 PPPS, SUBSEQ VISIT: HCPCS | Performed by: FAMILY MEDICINE

## 2023-08-31 PROCEDURE — 3075F SYST BP GE 130 - 139MM HG: CPT | Performed by: FAMILY MEDICINE

## 2023-08-31 RX ORDER — CELECOXIB 200 MG/1
200 CAPSULE ORAL DAILY
Qty: 90 CAPSULE | Refills: 1 | Status: SHIPPED | OUTPATIENT
Start: 2023-08-31

## 2023-10-11 RX ORDER — ROSUVASTATIN CALCIUM 40 MG/1
40 TABLET, COATED ORAL NIGHTLY
Qty: 90 TABLET | Refills: 0 | Status: SHIPPED | OUTPATIENT
Start: 2023-10-11

## 2023-10-11 NOTE — TELEPHONE ENCOUNTER
Pt states the pharmacy said they are waiting on our response for a refill request. Pt is requesting refill on the following.          ROSUVASTATIN 40 MG Oral Tab90 oxhiwl69/3/2023Sig:   TAKE 1 TABLET BY MOUTH EVERY DAY AT NIGHTRoute:   (none)Order #:   [id number]     Pharmacy    [facility]ARMACY [address] AT [facility], [phone], [phone]

## 2023-10-11 NOTE — TELEPHONE ENCOUNTER
LOV: 8/31/24  Last Refill: 4/3/2023 #90 tablets   1Refill  Next Appointment: 2/20/2024 establish care with Dr. Evy Corral. Please see pended order and sign if appropriate.

## 2023-11-27 ENCOUNTER — OFFICE VISIT (OUTPATIENT)
Dept: FAMILY MEDICINE CLINIC | Facility: CLINIC | Age: 68
End: 2023-11-27
Payer: MEDICARE

## 2023-11-27 VITALS
OXYGEN SATURATION: 96 % | HEIGHT: 71 IN | BODY MASS INDEX: 28.42 KG/M2 | DIASTOLIC BLOOD PRESSURE: 68 MMHG | TEMPERATURE: 98 F | HEART RATE: 72 BPM | RESPIRATION RATE: 16 BRPM | WEIGHT: 203 LBS | SYSTOLIC BLOOD PRESSURE: 122 MMHG

## 2023-11-27 DIAGNOSIS — H00.014 HORDEOLUM EXTERNUM OF LEFT UPPER EYELID: ICD-10-CM

## 2023-11-27 DIAGNOSIS — J06.9 VIRAL UPPER RESPIRATORY ILLNESS: Primary | ICD-10-CM

## 2023-11-27 PROCEDURE — 3074F SYST BP LT 130 MM HG: CPT

## 2023-11-27 PROCEDURE — 1159F MED LIST DOCD IN RCRD: CPT

## 2023-11-27 PROCEDURE — 1160F RVW MEDS BY RX/DR IN RCRD: CPT

## 2023-11-27 PROCEDURE — 3078F DIAST BP <80 MM HG: CPT

## 2023-11-27 PROCEDURE — 3008F BODY MASS INDEX DOCD: CPT

## 2023-11-27 PROCEDURE — 99213 OFFICE O/P EST LOW 20 MIN: CPT

## 2023-11-27 RX ORDER — ERYTHROMYCIN 5 MG/G
1 OINTMENT OPHTHALMIC EVERY 6 HOURS
Qty: 1 EACH | Refills: 0 | Status: SHIPPED | OUTPATIENT
Start: 2023-11-27

## 2023-12-30 DIAGNOSIS — E78.00 PURE HYPERCHOLESTEROLEMIA: Primary | ICD-10-CM

## 2024-01-02 RX ORDER — ROSUVASTATIN CALCIUM 40 MG/1
40 TABLET, COATED ORAL NIGHTLY
Qty: 30 TABLET | Refills: 0 | Status: SHIPPED | OUTPATIENT
Start: 2024-01-02

## 2024-01-02 NOTE — TELEPHONE ENCOUNTER
LOV:  08/31/2023 for: MA Supervisit  Patient advised to RTC on:  6 months (on 2/29/2024).   Nov:02/20/2024    Medication Quantity Refills Start End   rosuvastatin 40 MG Oral Tab 90 tablet 0 10/11/2023 --   Sig:   Take 1 tablet (40 mg total) by mouth nightly.

## 2024-01-08 ENCOUNTER — TELEPHONE (OUTPATIENT)
Dept: FAMILY MEDICINE CLINIC | Facility: CLINIC | Age: 69
End: 2024-01-08

## 2024-01-08 DIAGNOSIS — I71.40 ABDOMINAL AORTIC ANEURYSM (AAA) WITHOUT RUPTURE (HCC): ICD-10-CM

## 2024-01-08 DIAGNOSIS — I25.10 CORONARY ARTERY DISEASE INVOLVING NATIVE HEART WITHOUT ANGINA PECTORIS, UNSPECIFIED VESSEL OR LESION TYPE: ICD-10-CM

## 2024-01-08 DIAGNOSIS — I10 ESSENTIAL HYPERTENSION, BENIGN: ICD-10-CM

## 2024-01-08 RX ORDER — METOPROLOL SUCCINATE 50 MG/1
TABLET, EXTENDED RELEASE ORAL
Qty: 90 TABLET | Refills: 0 | Status: SHIPPED | OUTPATIENT
Start: 2024-01-08

## 2024-01-08 NOTE — TELEPHONE ENCOUNTER
Pt is requesting a refill on the following       metoprolol succinate ER 50 MG Oral Tablet 24 Hr 90 tablet 0 8/28/2023 --    Sig: TAKE 1 TABLET BY MOUTH EVERY DAY    Sent to pharmacy as: Metoprolol Succinate ER 50 MG Oral Tablet Extended Release 24 Hour (Toprol XL)    E-Prescribing Status: Receipt confirmed by pharmacy (8/28/2023  8:55 AM CDT)      Associated Diagnoses    Coronary artery disease involving native heart without angina pectoris, unspecified vessel or lesion type      Essential hypertension, benign      Abdominal aortic aneurysm (AAA) without rupture (HCC)        Pharmacy    [id number] [address] [facility] AT [facility], [phone], [phone]

## 2024-01-08 NOTE — TELEPHONE ENCOUNTER
LOV:08/31/2023      NOV:02/20/2024    Medication:       metoprolol succinate ER 50 MG Oral Tablet 24 Hr 90 tablet 0 8/28/2023 --   Sig:   TAKE 1 TABLET BY MOUTH EVERY DA

## 2024-01-24 ENCOUNTER — HOSPITAL ENCOUNTER (OUTPATIENT)
Dept: CT IMAGING | Facility: HOSPITAL | Age: 69
Discharge: HOME | End: 2024-01-24
Attending: INTERNAL MEDICINE
Payer: MEDICARE

## 2024-01-24 DIAGNOSIS — Z95.1 HX OF CABG: ICD-10-CM

## 2024-01-24 DIAGNOSIS — I10 HYPERTENSION, BENIGN: ICD-10-CM

## 2024-01-24 DIAGNOSIS — I71.40 ABDOMINAL AORTIC ANEURYSM WITHOUT RUPTURE (HCC): ICD-10-CM

## 2024-01-24 LAB
CREAT BLD-MCNC: 1.1 MG/DL
EGFRCR SERPLBLD CKD-EPI 2021: 74 ML/MIN/1.73M2 (ref 60–?)

## 2024-01-24 PROCEDURE — 74174 CTA ABD&PLVS W/CONTRAST: CPT | Performed by: INTERNAL MEDICINE

## 2024-01-24 PROCEDURE — 82565 ASSAY OF CREATININE: CPT

## 2024-01-24 RX ORDER — IOHEXOL 350 MG/ML
100 INJECTION, SOLUTION INTRAVENOUS
Status: COMPLETED | OUTPATIENT
Start: 2024-01-24 | End: 2024-01-24

## 2024-01-24 RX ADMIN — IOHEXOL 100 ML: 350 INJECTION, SOLUTION INTRAVENOUS at 08:20:00

## 2024-01-29 DIAGNOSIS — E78.00 PURE HYPERCHOLESTEROLEMIA: ICD-10-CM

## 2024-01-29 RX ORDER — ROSUVASTATIN CALCIUM 40 MG/1
40 TABLET, COATED ORAL NIGHTLY
Qty: 30 TABLET | Refills: 0 | Status: SHIPPED | OUTPATIENT
Start: 2024-01-29

## 2024-01-29 NOTE — TELEPHONE ENCOUNTER
LOV: 08/31/2023  for: MA-Supervisit  Patient advised to RTC on:  6 months (on 2/29/2024)   Nov:02/20/2024        Medication Quantity Refills Start End   rosuvastatin 40 MG Oral Tab 30 tablet 0 1/2/2024 --   Sig:   TAKE 1 TABLET BY MOUTH EVERY DAY AT NIGHT

## 2024-02-15 LAB
ABSOLUTE BASOPHILS: 27 CELLS/UL (ref 0–200)
ABSOLUTE EOSINOPHILS: 252 CELLS/UL (ref 15–500)
ABSOLUTE LYMPHOCYTES: 1190 CELLS/UL (ref 850–3900)
ABSOLUTE MONOCYTES: 748 CELLS/UL (ref 200–950)
ABSOLUTE NEUTROPHILS: 4583 CELLS/UL (ref 1500–7800)
ALBUMIN/GLOBULIN RATIO: 1.8 (CALC) (ref 1–2.5)
ALBUMIN: 4.1 G/DL (ref 3.6–5.1)
ALKALINE PHOSPHATASE: 45 U/L (ref 35–144)
ALT: 19 U/L (ref 9–46)
AST: 22 U/L (ref 10–35)
BASOPHILS: 0.4 %
BILIRUBIN, TOTAL: 0.8 MG/DL (ref 0.2–1.2)
BUN: 17 MG/DL (ref 7–25)
CALCIUM: 9.1 MG/DL (ref 8.6–10.3)
CARBON DIOXIDE: 31 MMOL/L (ref 20–32)
CHLORIDE: 103 MMOL/L (ref 98–110)
CHOL/HDLC RATIO: 3.2 (CALC)
CHOLESTEROL, TOTAL: 120 MG/DL
CREATININE: 1.06 MG/DL (ref 0.7–1.35)
EGFR: 77 ML/MIN/1.73M2
EOSINOPHILS: 3.7 %
GLOBULIN: 2.3 G/DL (CALC) (ref 1.9–3.7)
GLUCOSE: 94 MG/DL (ref 65–99)
HDL CHOLESTEROL: 38 MG/DL
HEMATOCRIT: 47.3 % (ref 38.5–50)
HEMOGLOBIN: 15.7 G/DL (ref 13.2–17.1)
LDL-CHOLESTEROL: 61 MG/DL (CALC)
LYMPHOCYTES: 17.5 %
MCH: 29.6 PG (ref 27–33)
MCHC: 33.2 G/DL (ref 32–36)
MCV: 89.2 FL (ref 80–100)
MONOCYTES: 11 %
MPV: 11 FL (ref 7.5–12.5)
NEUTROPHILS: 67.4 %
NON-HDL CHOLESTEROL: 82 MG/DL (CALC)
PLATELET COUNT: 183 THOUSAND/UL (ref 140–400)
POTASSIUM: 4.7 MMOL/L (ref 3.5–5.3)
PROTEIN, TOTAL: 6.4 G/DL (ref 6.1–8.1)
PSA, TOTAL: 1.07 NG/ML
RDW: 11.8 % (ref 11–15)
RED BLOOD CELL COUNT: 5.3 MILLION/UL (ref 4.2–5.8)
SODIUM: 140 MMOL/L (ref 135–146)
TRIGLYCERIDES: 129 MG/DL
WHITE BLOOD CELL COUNT: 6.8 THOUSAND/UL (ref 3.8–10.8)

## 2024-02-20 ENCOUNTER — OFFICE VISIT (OUTPATIENT)
Dept: FAMILY MEDICINE CLINIC | Facility: CLINIC | Age: 69
End: 2024-02-20
Payer: MEDICARE

## 2024-02-20 VITALS
WEIGHT: 200 LBS | SYSTOLIC BLOOD PRESSURE: 122 MMHG | RESPIRATION RATE: 18 BRPM | HEART RATE: 68 BPM | TEMPERATURE: 97 F | DIASTOLIC BLOOD PRESSURE: 80 MMHG | HEIGHT: 71 IN | BODY MASS INDEX: 28 KG/M2

## 2024-02-20 DIAGNOSIS — E78.00 PURE HYPERCHOLESTEROLEMIA: ICD-10-CM

## 2024-02-20 DIAGNOSIS — I25.10 CORONARY ARTERY DISEASE INVOLVING NATIVE HEART WITHOUT ANGINA PECTORIS, UNSPECIFIED VESSEL OR LESION TYPE: ICD-10-CM

## 2024-02-20 DIAGNOSIS — I10 ESSENTIAL HYPERTENSION, BENIGN: ICD-10-CM

## 2024-02-20 DIAGNOSIS — I71.40 ABDOMINAL AORTIC ANEURYSM (AAA) WITHOUT RUPTURE (HCC): ICD-10-CM

## 2024-02-20 DIAGNOSIS — M15.9 PRIMARY OSTEOARTHRITIS INVOLVING MULTIPLE JOINTS: ICD-10-CM

## 2024-02-20 PROCEDURE — 99214 OFFICE O/P EST MOD 30 MIN: CPT | Performed by: FAMILY MEDICINE

## 2024-02-20 RX ORDER — METOPROLOL SUCCINATE 50 MG/1
TABLET, EXTENDED RELEASE ORAL
Qty: 90 TABLET | Refills: 1 | Status: SHIPPED | OUTPATIENT
Start: 2024-02-20

## 2024-02-20 RX ORDER — CELECOXIB 200 MG/1
200 CAPSULE ORAL DAILY
Qty: 90 CAPSULE | Refills: 1 | Status: SHIPPED | OUTPATIENT
Start: 2024-02-20

## 2024-02-20 RX ORDER — ROSUVASTATIN CALCIUM 40 MG/1
40 TABLET, COATED ORAL NIGHTLY
Qty: 90 TABLET | Refills: 1 | Status: SHIPPED | OUTPATIENT
Start: 2024-02-20

## 2024-02-20 NOTE — PROGRESS NOTES
Cheng Zurita is a 68 year old male.  Hypertension, hyperlipidemia, coronary artery disease, aortic aneurysm, osteoarthritis  HPI:   Patient is a former patient of Dr. Diaz.  He is coming for first visit.  Hypertension patient is taking metoprolol doing well with medication.  Blood pressure is stable doing well denies any chest pain no shortness of breath no palpitations    Hypercholesterolemia patient is taking rosuvastatin 40 mg daily doing well with medication.  Patient had a blood work done which came back  good numbers are stable continue current medication refill called in    Coronary artery disease patient had 2 bypass CABG in December 2018.  He had LIMA to LAD, saphenous vein graft to the diagonal and posterior descending.  Patient is doing well.  Denies any chest pain no shortness of breath.    Patient was diagnosed with abdominal aortic aneurysm on June 25, 2021 which was repaired.  He is continuing to see cardiologist Dr. Bonner and vascular surgeon Dr. Addy Morton.  Had CTA of the abdomen which shows enlargement of the aneurysmal sac to 5.5 cm likely from type II endoleak.  Patient was recommended to see Dr. Aguirre and has an appointment next Wednesday with him most likely will need a surgery to repair the leak.  Patient has osteoarthritis in his thumbs.  He is taking Celebrex tolerates medication well.  Notices difference when the medication is stopped.  Would like refill of the medication which was called in.    Colonoscopy was on October 22, 2021.  Patient has history of left hernia repair in July 2020.    Patient had COVID at the end of January 2024 had mild symptoms doing well.    Current Outpatient Medications   Medication Sig Dispense Refill   • rosuvastatin 40 MG Oral Tab Take 1 tablet (40 mg total) by mouth nightly. 90 tablet 1   • metoprolol succinate ER 50 MG Oral Tablet 24 Hr TAKE 1 TABLET BY MOUTH EVERY DAY 90 tablet 1   • celecoxib 200 MG Oral Cap Take 1 capsule (200 mg total)  by mouth daily. 90 capsule 1   • Sildenafil Citrate 50 MG Oral Tab Take 1 tablet (50 mg total) by mouth daily as needed for Erectile Dysfunction. 15 tablet 3   • Multiple Vitamin (MULTIVITAMIN ADULT OR) Take 1 tablet by mouth daily.     • Saw Palmetto, Serenoa repens, (SAW PALMETTO OR) Take 1 tablet by mouth daily.     • Pantoprazole Sodium 40 MG Oral Tab EC TAKE 1 TABLET(40 MG) BY MOUTH EVERY DAY (Patient taking differently: Take 1 tablet (40 mg total) by mouth as needed. TAKE 1 TABLET(40 MG) BY MOUTH EVERY DAY PRN) 90 tablet 0   • Coenzyme Q10 (CO Q 10 OR) Take 1 capsule by mouth daily.     • aspirin 81 MG Oral Tab Take 1 tablet (81 mg total) by mouth daily.     • erythromycin 5 MG/GM Ophthalmic Ointment Apply 1 Application to eye every 6 (six) hours. 1 each 0      Past Medical History:   Diagnosis Date   • AAA (abdominal aortic aneurysm) (HCC)    • Abdominal hernia    • Arthritis    • Atherosclerosis of coronary artery    • Coronary atherosclerosis    • Esophageal reflux    • Flatulence/gas pain/belching    • High blood pressure    • High cholesterol    • Indigestion    • Lumbar spinal stenosis    • Obesity    • Osteoarthritis    • Sleep apnea 2019    no longer using cpap   • Stented coronary artery    • Visual impairment     contacts/glasses      Social History:  Social History     Socioeconomic History   • Marital status:    Tobacco Use   • Smoking status: Former     Years: 12     Types: Cigarettes   • Smokeless tobacco: Never   • Tobacco comments:     QUIT 35 YRS AGO   Vaping Use   • Vaping Use: Never used   Substance and Sexual Activity   • Alcohol use: Yes     Comment: around 6 drinks per week   • Drug use: No   Other Topics Concern   • Caffeine Concern No     Comment: none   • Exercise Yes     Comment: on hold    • Seat Belt Yes        REVIEW OF SYSTEMS:   GENERAL HEALTH: feels well otherwise  SKIN: denies any unusual skin lesions or rashes  HEENT no congestion no runny nose no sore throat  Neck no  neck pain  RESPIRATORY: denies shortness of breath with exertion  CARDIOVASCULAR: denies chest pain on exertion  GI: denies abdominal pain and denies heartburn  NEURO: denies headaches  Psych normal mood.    EXAM:   /80 (BP Location: Left arm, Patient Position: Sitting, Cuff Size: adult)   Pulse 68   Temp 96.6 °F (35.9 °C) (Temporal)   Resp 18   Ht 5' 11\" (1.803 m)   Wt 200 lb (90.7 kg)   BMI 27.89 kg/m²   GENERAL: well developed, well nourished,in no apparent distress  SKIN: no rashes,no suspicious lesions  HEENT: atraumatic, normocephalic,ears and throat are clear  NECK: supple,no adenopathy,  LUNGS: clear to auscultation  CARDIO: RRR without murmur  GI: good BS's,no masses, HSM or tenderness  EXTREMITIES: no edema  Psychiatric - alert  and oriented x3, normal mood     Results for orders placed or performed during the hospital encounter of 01/24/24   POCT CREATININE   Result Value Ref Range    ISTAT Creatinine 1.10 0.70 - 1.30 mg/dL    eGFR-Cr 74 >=60 mL/min/1.73m2     ASSESSMENT AND PLAN:     Encounter Diagnoses   Name Primary?   • Pure hypercholesterolemia    • Coronary artery disease involving native heart without angina pectoris, unspecified vessel or lesion type    • Essential hypertension, benign    • Abdominal aortic aneurysm (AAA) without rupture (HCC)    • Primary osteoarthritis involving multiple joints        Orders Placed This Encounter   Procedures   • CBC With Differential With Platelet   • Comp Metabolic Panel (14)   • Lipid Panel   • Urinalysis with Culture Reflex   • TSH W Reflex To Free T4       Meds & Refills for this Visit:  Requested Prescriptions     Signed Prescriptions Disp Refills   • rosuvastatin 40 MG Oral Tab 90 tablet 1     Sig: Take 1 tablet (40 mg total) by mouth nightly.   • metoprolol succinate ER 50 MG Oral Tablet 24 Hr 90 tablet 1     Sig: TAKE 1 TABLET BY MOUTH EVERY DAY   • celecoxib 200 MG Oral Cap 90 capsule 1     Sig: Take 1 capsule (200 mg total) by mouth daily.    Continue current meds.   Watch diet for fats and carbs.   Stay active.    Flonase as needed.  Do fasting blood work prior next visit.    Imaging & Consults:  None    The patient indicates understanding of these issues and agrees to the plan.  The patient is asked to return in August 2024 for super visit.  The note was dictated using speech recognition software.  Accuracy and grammar in transcription may be subject to error.

## 2024-02-20 NOTE — PATIENT INSTRUCTIONS
Continue current meds.   Watch diet for fats and carbs.   Stay active.    Flonase as needed.  Do fasting blood work prior next visit.

## 2024-04-03 ENCOUNTER — OFFICE VISIT (OUTPATIENT)
Dept: FAMILY MEDICINE CLINIC | Facility: CLINIC | Age: 69
End: 2024-04-03
Payer: MEDICARE

## 2024-04-03 VITALS
TEMPERATURE: 98 F | OXYGEN SATURATION: 98 % | SYSTOLIC BLOOD PRESSURE: 128 MMHG | DIASTOLIC BLOOD PRESSURE: 86 MMHG | HEART RATE: 73 BPM | HEIGHT: 71 IN | WEIGHT: 195 LBS | RESPIRATION RATE: 16 BRPM | BODY MASS INDEX: 27.3 KG/M2

## 2024-04-03 DIAGNOSIS — H66.91 RIGHT ACUTE OTITIS MEDIA: ICD-10-CM

## 2024-04-03 DIAGNOSIS — J34.89 SINUS PRESSURE: ICD-10-CM

## 2024-04-03 DIAGNOSIS — R09.89 ABNORMAL LUNG SOUNDS: Primary | ICD-10-CM

## 2024-04-03 PROCEDURE — 99213 OFFICE O/P EST LOW 20 MIN: CPT | Performed by: NURSE PRACTITIONER

## 2024-04-03 RX ORDER — DOXYCYCLINE HYCLATE 100 MG/1
100 CAPSULE ORAL 2 TIMES DAILY
Qty: 14 CAPSULE | Refills: 0 | Status: SHIPPED | OUTPATIENT
Start: 2024-04-03 | End: 2024-04-10

## 2024-04-03 NOTE — PROGRESS NOTES
HPI:   Cheng Zurita is a 68 year old male who presents with ill symptoms for  5  days. Patient reports nasal congestion, throat clearing, mild cough not waking from sleep, sinus pressure. Denies fever, shortness of breath,chest pain, ear pain or pressure, headache. Has tried Zyrtec and Flonase with mild relief. Grandchildren were present and all are sick, treated for sinus infection/ear infection and taking steroids/antibiotics with unknown relief, visiting from [facility].    Current Outpatient Medications   Medication Sig Dispense Refill    ELDERBERRY OR Take 1 each by mouth daily.      rosuvastatin 40 MG Oral Tab Take 1 tablet (40 mg total) by mouth nightly. 90 tablet 1    metoprolol succinate ER 50 MG Oral Tablet 24 Hr TAKE 1 TABLET BY MOUTH EVERY DAY 90 tablet 1    celecoxib 200 MG Oral Cap Take 1 capsule (200 mg total) by mouth daily. 90 capsule 1    Sildenafil Citrate 50 MG Oral Tab Take 1 tablet (50 mg total) by mouth daily as needed for Erectile Dysfunction. 15 tablet 3    Multiple Vitamin (MULTIVITAMIN ADULT OR) Take 1 tablet by mouth daily.      Saw Palmetto, Serenoa repens, (SAW PALMETTO OR) Take 1 tablet by mouth daily.      Pantoprazole Sodium 40 MG Oral Tab EC TAKE 1 TABLET(40 MG) BY MOUTH EVERY DAY (Patient taking differently: Take 1 tablet (40 mg total) by mouth as needed. TAKE 1 TABLET(40 MG) BY MOUTH EVERY DAY PRN) 90 tablet 0    Coenzyme Q10 (CO Q 10 OR) Take 1 capsule by mouth daily.      aspirin 81 MG Oral Tab Take 1 tablet (81 mg total) by mouth daily.       No current facility-administered medications for this visit.      Past Medical History:   Diagnosis Date    AAA (abdominal aortic aneurysm) (HCC)     Abdominal hernia     Arthritis     Atherosclerosis of coronary artery     Coronary atherosclerosis     Esophageal reflux     Flatulence/gas pain/belching     High blood pressure     High cholesterol     Indigestion     Lumbar spinal stenosis     Obesity     Osteoarthritis      Sleep apnea 2019    no longer using cpap    Stented coronary artery     Visual impairment     contacts/glasses      Past Surgical History:   Procedure Laterality Date    ANGIOGRAM      ANGIOPLASTY (CORONARY)  Dec. 7, 2018    BYPASS SURGERY      CABG  12/17/2018    TRIPLE BYPASS    CARDIAC CATHETERIZATION  12/07/2018    DR NEFF    CATH ANGIO  12/07/2018        COLONOSCOPY  2001, 2006, 2011    COLONOSCOPY      COLONOSCOPY N/A 10/22/2021    Procedure: COLONOSCOPY;  Surgeon: Jordy Jackson DO;  Location:  ENDOSCOPY    COLONOSCOPY,DIAGNOSTIC N/A 8/5/2016    Procedure: COLONOSCOPY, POSSIBLE BIOPSY, POSSIBLE POLYPECTOMY 46275;  Surgeon: Raghav Guillermo MD;  [facility]    ENDOVAS REPAIR, INFRARENL ABDOM AORTIC ANEURYSM/DISSECT      HERNIA SURGERY  07/10/2020    Robotic repair left inguinal hernia with mesh    OTHER SURGICAL HISTORY  08/20/2019    Robotic-assisted repair right inguinal hernia with mesh      Family History   Problem Relation Age of Onset    Colon Cancer Father 70    Cancer Father         started as colon cancer-moved to bone then tumor on kidney    Cancer Mother         breast cancer. mets \"all over\"    Breast Cancer Mother     Other (Other) Son         post-op complications passed away age 15    Diabetes Maternal Grandmother     Cancer Maternal Grandfather     Heart Attack Paternal Grandmother       Social History     Socioeconomic History    Marital status:    Tobacco Use    Smoking status: Former     Years: 12     Types: Cigarettes    Smokeless tobacco: Never    Tobacco comments:     QUIT 35 YRS AGO   Vaping Use    Vaping Use: Never used   Substance and Sexual Activity    Alcohol use: Yes     Comment: around 6 drinks per week    Drug use: No   Other Topics Concern    Caffeine Concern No     Comment: none    Exercise Yes     Comment: on hold     Seat Belt Yes         REVIEW OF SYSTEMS:   GENERAL: feels well otherwise, sleep not affected  HEENT: congested, as  above in HPI  LUNGS: denies shortness of breath with exertion  CARDIOVASCULAR: denies chest pain on exertion  GI: no nausea or abdominal pain, appetite normal  NEURO: denies current headaches    EXAM:   /86   Pulse 73   Temp 97.6 °F (36.4 °C)   Resp 16   Ht 5' 11\" (1.803 m)   Wt 195 lb (88.5 kg)   SpO2 98%   BMI 27.20 kg/m²   GENERAL: well developed, well nourished,in no apparent distress, mildly congested and clearing throat often but non toxic appearing  HEENT: atraumatic, normocephalic,ears with right TM full and cloudy, left TM normal appearing, nares with mild rhinorrhea, throat pink with thick PND noted. [place] midline.  Positive maxillary sinus tenderness with palpation.  NECK: supple,anterior cervical adenopathy  LUNGS: clear to auscultation all lobes EXCEPT right lower lobe with rhonchi  CARDIO: RRR without murmur      ASSESSMENT AND PLAN:    PLAN:Joseluis was seen today for sinus problem.    Diagnoses and all orders for this visit:    Abnormal lung sounds  -     doxycycline 100 MG Oral Cap; Take 1 capsule (100 mg total) by mouth 2 (two) times daily for 7 days.    Right acute otitis media  -     doxycycline 100 MG Oral Cap; Take 1 capsule (100 mg total) by mouth 2 (two) times daily for 7 days.    Sinus pressure  -     doxycycline 100 MG Oral Cap; Take 1 capsule (100 mg total) by mouth 2 (two) times daily for 7 days.      Meds as above. Self care discussed. Medication use and risk/benefit discussed. Patient is advised to follow up with PCP if not improving with treatment plan or seek immediate care if symptoms worsen.  The patient indicates understanding of these issues and agrees to the plan.  Patient Instructions    PLAN: Doxycycline, take as directed. Finish all the medication even if you feel better.   Salt water gargles (1 tsp. Salt in 6 oz lukewarm water), use several times daily to help decrease swelling and pain.  May continue Zyrtec (NOT Zyrtec-D) and Flonase as discussed.  Saline nasal  spray to nostrils if needed to help remove drainage or congestion in nose.   Hydrate! (cold or hot based on comfort). Drink lots of water or other non dehydrating liquids to help with illness. Warm pack to face as needed.   Hand washing-use hand  or wash hands frequently, cover your cough or sneeze, do not share towels or drinks with others.  May use Tylenol or Ibuprofen over the counter for pain/comfort if not contraindicated.  Follow up in 2 weeks with Dr. Gruber if not improved, or sooner if worsening symptoms. Seek immediate care if inability to swallow or breathe.

## 2024-04-03 NOTE — PATIENT INSTRUCTIONS
PLAN: Doxycycline, take as directed. Finish all the medication even if you feel better.   Salt water gargles (1 tsp. Salt in 6 oz lukewarm water), use several times daily to help decrease swelling and pain.  May continue Zyrtec (NOT Zyrtec-D) and Flonase as discussed.  Saline nasal spray to nostrils if needed to help remove drainage or congestion in nose.   Hydrate! (cold or hot based on comfort). Drink lots of water or other non dehydrating liquids to help with illness. Warm pack to face as needed.   Hand washing-use hand  or wash hands frequently, cover your cough or sneeze, do not share towels or drinks with others.  May use Tylenol or Ibuprofen over the counter for pain/comfort if not contraindicated.  Follow up in 2 weeks with Dr. Gruber if not improved, or sooner if worsening symptoms. Seek immediate care if inability to swallow or breathe.

## 2024-04-12 ENCOUNTER — HOSPITAL ENCOUNTER (OUTPATIENT)
Dept: CT IMAGING | Facility: HOSPITAL | Age: 69
Discharge: HOME | End: 2024-04-12
Attending: RADIOLOGY
Payer: MEDICARE

## 2024-04-12 ENCOUNTER — LAB ENCOUNTER (OUTPATIENT)
Dept: LAB | Facility: HOSPITAL | Age: 69
End: 2024-04-12
Attending: RADIOLOGY
Payer: MEDICARE

## 2024-04-12 DIAGNOSIS — I71.40 AAA (ABDOMINAL AORTIC ANEURYSM) (HCC): Primary | ICD-10-CM

## 2024-04-12 DIAGNOSIS — I71.40 AAA (ABDOMINAL AORTIC ANEURYSM) (HCC): ICD-10-CM

## 2024-04-12 LAB
ANION GAP SERPL CALC-SCNC: 2 MMOL/L (ref 0–18)
BUN BLD-MCNC: 13 MG/DL (ref 9–23)
CALCIUM BLD-MCNC: 9 MG/DL (ref 8.5–10.1)
CHLORIDE SERPL-SCNC: 109 MMOL/L (ref 98–112)
CO2 SERPL-SCNC: 30 MMOL/L (ref 21–32)
CREAT BLD-MCNC: 1.1 MG/DL
EGFRCR SERPLBLD CKD-EPI 2021: 74 ML/MIN/1.73M2 (ref 60–?)
FASTING STATUS PATIENT QL REPORTED: YES
GLUCOSE BLD-MCNC: 103 MG/DL (ref 70–99)
OSMOLALITY SERPL CALC.SUM OF ELEC: 292 MOSM/KG (ref 275–295)
POTASSIUM SERPL-SCNC: 4.9 MMOL/L (ref 3.5–5.1)
SODIUM SERPL-SCNC: 141 MMOL/L (ref 136–145)

## 2024-04-12 PROCEDURE — 36415 COLL VENOUS BLD VENIPUNCTURE: CPT

## 2024-04-12 PROCEDURE — 80048 BASIC METABOLIC PNL TOTAL CA: CPT

## 2024-04-12 PROCEDURE — 74174 CTA ABD&PLVS W/CONTRAST: CPT | Performed by: RADIOLOGY

## 2024-08-10 DIAGNOSIS — E78.00 PURE HYPERCHOLESTEROLEMIA: ICD-10-CM

## 2024-08-12 RX ORDER — ROSUVASTATIN CALCIUM 40 MG/1
40 TABLET, COATED ORAL NIGHTLY
Qty: 30 TABLET | Refills: 0 | Status: SHIPPED | OUTPATIENT
Start: 2024-08-12

## 2024-08-12 NOTE — TELEPHONE ENCOUNTER
LOV: 2/20/2024  for: Medication Follow-Up   Patient advised to RTC on:  August 2024 for super visit.   Nov:08/30/2024    Medication Quantity Refills Start End   rosuvastatin 40 MG Oral Tab 90 tablet 1 2/20/2024 --   Sig:   Take 1 tablet (40 mg total) by mouth nightly.

## 2024-08-13 ENCOUNTER — LAB ENCOUNTER (OUTPATIENT)
Dept: LAB | Age: 69
End: 2024-08-13
Attending: FAMILY MEDICINE
Payer: MEDICARE

## 2024-08-13 LAB
ALBUMIN SERPL-MCNC: 4.4 G/DL (ref 3.2–4.8)
ALBUMIN/GLOB SERPL: 1.8 {RATIO} (ref 1–2)
ALP LIVER SERPL-CCNC: 47 U/L
ALT SERPL-CCNC: 18 U/L
ANION GAP SERPL CALC-SCNC: 3 MMOL/L (ref 0–18)
AST SERPL-CCNC: 25 U/L (ref ?–34)
BASOPHILS # BLD AUTO: 0.04 X10(3) UL (ref 0–0.2)
BASOPHILS NFR BLD AUTO: 0.6 %
BILIRUB SERPL-MCNC: 0.8 MG/DL (ref 0.2–1.1)
BILIRUB UR QL STRIP.AUTO: NEGATIVE
BUN BLD-MCNC: 14 MG/DL (ref 9–23)
CALCIUM BLD-MCNC: 9.8 MG/DL (ref 8.7–10.4)
CHLORIDE SERPL-SCNC: 107 MMOL/L (ref 98–112)
CHOLEST SERPL-MCNC: 129 MG/DL (ref ?–200)
CLARITY UR REFRACT.AUTO: CLEAR
CO2 SERPL-SCNC: 31 MMOL/L (ref 21–32)
CREAT BLD-MCNC: 1.04 MG/DL
EGFRCR SERPLBLD CKD-EPI 2021: 78 ML/MIN/1.73M2 (ref 60–?)
EOSINOPHIL # BLD AUTO: 0.35 X10(3) UL (ref 0–0.7)
EOSINOPHIL NFR BLD AUTO: 5.3 %
ERYTHROCYTE [DISTWIDTH] IN BLOOD BY AUTOMATED COUNT: 12.2 %
FASTING PATIENT LIPID ANSWER: YES
FASTING STATUS PATIENT QL REPORTED: YES
GLOBULIN PLAS-MCNC: 2.4 G/DL (ref 2–3.5)
GLUCOSE BLD-MCNC: 93 MG/DL (ref 70–99)
GLUCOSE UR STRIP.AUTO-MCNC: NORMAL MG/DL
HCT VFR BLD AUTO: 47.4 %
HDLC SERPL-MCNC: 39 MG/DL (ref 40–59)
HGB BLD-MCNC: 15.9 G/DL
IMM GRANULOCYTES # BLD AUTO: 0.03 X10(3) UL (ref 0–1)
IMM GRANULOCYTES NFR BLD: 0.5 %
KETONES UR STRIP.AUTO-MCNC: NEGATIVE MG/DL
LDLC SERPL CALC-MCNC: 65 MG/DL (ref ?–100)
LEUKOCYTE ESTERASE UR QL STRIP.AUTO: NEGATIVE
LYMPHOCYTES # BLD AUTO: 0.99 X10(3) UL (ref 1–4)
LYMPHOCYTES NFR BLD AUTO: 14.9 %
MCH RBC QN AUTO: 29.8 PG (ref 26–34)
MCHC RBC AUTO-ENTMCNC: 33.5 G/DL (ref 31–37)
MCV RBC AUTO: 88.9 FL
MONOCYTES # BLD AUTO: 0.77 X10(3) UL (ref 0.1–1)
MONOCYTES NFR BLD AUTO: 11.6 %
NEUTROPHILS # BLD AUTO: 4.46 X10 (3) UL (ref 1.5–7.7)
NEUTROPHILS # BLD AUTO: 4.46 X10(3) UL (ref 1.5–7.7)
NEUTROPHILS NFR BLD AUTO: 67.1 %
NITRITE UR QL STRIP.AUTO: NEGATIVE
NONHDLC SERPL-MCNC: 90 MG/DL (ref ?–130)
OSMOLALITY SERPL CALC.SUM OF ELEC: 292 MOSM/KG (ref 275–295)
PH UR STRIP.AUTO: 6 [PH] (ref 5–8)
PLATELET # BLD AUTO: 169 10(3)UL (ref 150–450)
POTASSIUM SERPL-SCNC: 4.4 MMOL/L (ref 3.5–5.1)
PROT SERPL-MCNC: 6.8 G/DL (ref 5.7–8.2)
PROT UR STRIP.AUTO-MCNC: NEGATIVE MG/DL
RBC # BLD AUTO: 5.33 X10(6)UL
RBC UR QL AUTO: NEGATIVE
SODIUM SERPL-SCNC: 141 MMOL/L (ref 136–145)
SP GR UR STRIP.AUTO: 1.01 (ref 1–1.03)
TRIGL SERPL-MCNC: 144 MG/DL (ref 30–149)
TSI SER-ACNC: 1.71 MIU/ML (ref 0.55–4.78)
UROBILINOGEN UR STRIP.AUTO-MCNC: NORMAL MG/DL
VLDLC SERPL CALC-MCNC: 22 MG/DL (ref 0–30)
WBC # BLD AUTO: 6.6 X10(3) UL (ref 4–11)

## 2024-08-13 PROCEDURE — 80061 LIPID PANEL: CPT | Performed by: FAMILY MEDICINE

## 2024-08-13 PROCEDURE — 80053 COMPREHEN METABOLIC PANEL: CPT | Performed by: FAMILY MEDICINE

## 2024-08-13 PROCEDURE — 84443 ASSAY THYROID STIM HORMONE: CPT | Performed by: FAMILY MEDICINE

## 2024-08-13 PROCEDURE — 85025 COMPLETE CBC W/AUTO DIFF WBC: CPT | Performed by: FAMILY MEDICINE

## 2024-08-13 PROCEDURE — 81003 URINALYSIS AUTO W/O SCOPE: CPT | Performed by: FAMILY MEDICINE

## 2024-08-14 DIAGNOSIS — M15.0 PRIMARY OSTEOARTHRITIS INVOLVING MULTIPLE JOINTS: ICD-10-CM

## 2024-08-15 ENCOUNTER — MED REC SCAN ONLY (OUTPATIENT)
Dept: FAMILY MEDICINE CLINIC | Facility: CLINIC | Age: 69
End: 2024-08-15

## 2024-08-15 NOTE — TELEPHONE ENCOUNTER
Last Office Visit: 2/20/24  Last Refill: 2/20/24  Return to Clinic: August 2024   Protocol: passed  NOV: 8/30/24    Requested Prescriptions     Pending Prescriptions Disp Refills    celecoxib 200 MG Oral Cap 90 capsule 1     Sig: Take 1 capsule (200 mg total) by mouth daily.       Please approve if appropriate.     Thank you!

## 2024-08-16 RX ORDER — CELECOXIB 200 MG/1
200 CAPSULE ORAL DAILY
Qty: 30 CAPSULE | Refills: 0 | Status: SHIPPED | OUTPATIENT
Start: 2024-08-16

## 2024-08-30 ENCOUNTER — OFFICE VISIT (OUTPATIENT)
Dept: FAMILY MEDICINE CLINIC | Facility: CLINIC | Age: 69
End: 2024-08-30
Payer: MEDICARE

## 2024-08-30 VITALS
HEART RATE: 70 BPM | BODY MASS INDEX: 27.86 KG/M2 | DIASTOLIC BLOOD PRESSURE: 84 MMHG | TEMPERATURE: 97 F | WEIGHT: 199 LBS | HEIGHT: 71 IN | SYSTOLIC BLOOD PRESSURE: 131 MMHG | RESPIRATION RATE: 16 BRPM

## 2024-08-30 DIAGNOSIS — N40.1 BENIGN PROSTATIC HYPERPLASIA WITH NOCTURIA: ICD-10-CM

## 2024-08-30 DIAGNOSIS — I25.10 CORONARY ARTERY DISEASE INVOLVING NATIVE HEART WITHOUT ANGINA PECTORIS, UNSPECIFIED VESSEL OR LESION TYPE: ICD-10-CM

## 2024-08-30 DIAGNOSIS — I10 ESSENTIAL HYPERTENSION, BENIGN: ICD-10-CM

## 2024-08-30 DIAGNOSIS — Z86.79 STATUS POST AAA (ABDOMINAL AORTIC ANEURYSM) REPAIR: ICD-10-CM

## 2024-08-30 DIAGNOSIS — G47.33 OSA ON CPAP: ICD-10-CM

## 2024-08-30 DIAGNOSIS — Z80.0 FAMILY HISTORY OF COLON CANCER REQUIRING SCREENING COLONOSCOPY: ICD-10-CM

## 2024-08-30 DIAGNOSIS — M15.0 PRIMARY OSTEOARTHRITIS INVOLVING MULTIPLE JOINTS: ICD-10-CM

## 2024-08-30 DIAGNOSIS — K21.9 GASTROESOPHAGEAL REFLUX DISEASE WITHOUT ESOPHAGITIS: ICD-10-CM

## 2024-08-30 DIAGNOSIS — Z95.1 S/P CABG (CORONARY ARTERY BYPASS GRAFT): ICD-10-CM

## 2024-08-30 DIAGNOSIS — Z00.00 ENCOUNTER FOR ANNUAL HEALTH EXAMINATION: Primary | ICD-10-CM

## 2024-08-30 DIAGNOSIS — Z12.5 SCREENING FOR PROSTATE CANCER: ICD-10-CM

## 2024-08-30 DIAGNOSIS — E78.00 PURE HYPERCHOLESTEROLEMIA: ICD-10-CM

## 2024-08-30 DIAGNOSIS — R35.1 BENIGN PROSTATIC HYPERPLASIA WITH NOCTURIA: ICD-10-CM

## 2024-08-30 DIAGNOSIS — I71.40 ABDOMINAL AORTIC ANEURYSM (AAA) WITHOUT RUPTURE (HCC): ICD-10-CM

## 2024-08-30 DIAGNOSIS — Z98.890 STATUS POST AAA (ABDOMINAL AORTIC ANEURYSM) REPAIR: ICD-10-CM

## 2024-08-30 RX ORDER — ROSUVASTATIN CALCIUM 40 MG/1
40 TABLET, COATED ORAL NIGHTLY
Qty: 90 TABLET | Refills: 1 | Status: SHIPPED | OUTPATIENT
Start: 2024-08-30

## 2024-08-30 RX ORDER — METOPROLOL SUCCINATE 50 MG/1
TABLET, EXTENDED RELEASE ORAL
Qty: 90 TABLET | Refills: 1 | Status: SHIPPED | OUTPATIENT
Start: 2024-08-30

## 2024-08-30 RX ORDER — CELECOXIB 200 MG/1
200 CAPSULE ORAL DAILY
Qty: 30 CAPSULE | Refills: 0 | Status: SHIPPED | OUTPATIENT
Start: 2024-08-30

## 2024-08-30 NOTE — PATIENT INSTRUCTIONS
Continue current meds.   Watch diet for fats and carbs.   Stay active.    Do your flu COVID vaccination for the fall.  Consider getting RSV vaccination.  Consider getting hepatitis A vaccination for traveling.  Do fasting blood work prior visit in 6 months.  
None

## 2024-08-30 NOTE — PROGRESS NOTES
Subjective:   Cheng Zurita is a 69 year old male who presents for a MA AHA (Medicare Advantage Annual Health Assessment) and scheduled follow up of multiple significant but stable problems.   Several blood hypertension hypercholesterolemia coronary artery disease, AAA, obstructive sleep apnea, BPH.  Former patient of Dr. Diaz,coming for super visit..  Hypertension patient is taking metoprolol doing well with medication.  Blood pressure is stable doing well denies any chest pain no shortness of breath no palpitations.     Hypercholesterolemia patient is taking rosuvastatin 40 mg daily doing well with medication.  Patient had a blood work done which came back  good numbers are stable continue current medication refill called in.     Coronary artery disease patient had 2 bypass CABG in December 2018.  He had LIMA to LAD, saphenous vein graft to the diagonal and posterior descending.  Patient is doing well.  Denies any chest pain no shortness of breath.  Patient is seeing cardiologist Dr. Patino.     Patient was diagnosed with abdominal aortic aneurysm on June 25, 2021 which was repaired.  He is continuing to see cardiologist Dr. Bonner and vascular surgeon Dr. Addy Morton.  Had CTA of the abdomen which shows enlargement of the aneurysmal sac to 5.5 cm likely from type II endoleak.  Patient was recommended to see Dr. Aguirre aneurysm was repaired and patient is doing well and was monitored by cardiology team.  ,     Patient has osteoarthritis in his thumbs.  He is taking Celebrex tolerates medication well.  Notices difference when the medication is stopped.  Would like refill of the medication which was called in.  Trigger finger got injection of the steroid at his wife work.  Monitor.     Colonoscopy was on October 22, 2021.  Obstructive sleep apnea on CPAP    BPH - PSA came back stable recheck before next visit.    Patient's family history of colon cancer.  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      History/Other:   Fall Risk Assessment:   He has been screened for Falls and is low risk.      Cognitive Assessment:   He had a completely normal cognitive assessment - see flowsheet entries     Functional Ability/Status:   Cheng Zurita has a completely normal functional assessment. See flowsheet for details.        Depression Screening (PHQ):  PHQ-2 SCORE: 0  , done 8/30/2024         Advanced Directives:   He does have a Living Will but we do NOT have it on file in Epic.    He does have a POA but we do NOT have it on file in Epic.    Discussed Advance Care Planning with patient (and family/surrogate if present). Standard forms made available to patient in After Visit Summary.      Patient Active Problem List   Diagnosis    Esophageal reflux    Hyperlipidemia    Chest pain, unspecified    Benign essential HTN    Erectile dysfunction    Osteoarthritis    Trigeminal neuralgia    SHIV on CPAP    Family history of colon cancer requiring screening colonoscopy    Osteoarthritis of carpometacarpal joints of thumbs, bilateral    Prostate cancer screening    Obesity (BMI 30-39.9)    Right shoulder strain    Anxiety    CAD in native artery    S/P CABG (coronary artery bypass graft)    Nontraumatic complete tear of right rotator cuff    AAA (abdominal aortic aneurysm) without rupture (HCC)    Pure hypercholesterolemia     Allergies:  He is allergic to hydrocodone.    Current Medications:  Outpatient Medications Marked as Taking for the 8/30/24 encounter (Office Visit) with Marni Gruber MD   Medication Sig    rosuvastatin 40 MG Oral Tab Take 1 tablet (40 mg total) by mouth nightly.    metoprolol succinate ER 50 MG Oral Tablet 24 Hr TAKE 1 TABLET BY MOUTH EVERY DAY    celecoxib 200 MG Oral Cap Take 1 capsule (200 mg total) by mouth daily.    Sildenafil Citrate 50 MG Oral Tab Take 1 tablet (50 mg total) by mouth daily as needed for Erectile Dysfunction.     Multiple Vitamin (MULTIVITAMIN ADULT OR) Take 1 tablet by mouth daily.    Saw Palmetto, Serenoa repens, (SAW PALMETTO OR) Take 1 tablet by mouth daily.    Pantoprazole Sodium 40 MG Oral Tab EC TAKE 1 TABLET(40 MG) BY MOUTH EVERY DAY (Patient taking differently: Take 1 tablet (40 mg total) by mouth as needed. TAKE 1 TABLET(40 MG) BY MOUTH EVERY DAY PRN)    Coenzyme Q10 (CO Q 10 OR) Take 1 capsule by mouth daily.    aspirin 81 MG Oral Tab Take 1 tablet (81 mg total) by mouth daily.       Medical History:  He  has a past medical history of AAA (abdominal aortic aneurysm) (HCC), Abdominal hernia, Arthritis, Atherosclerosis of coronary artery, Coronary atherosclerosis, Esophageal reflux, Flatulence/gas pain/belching, High blood pressure, High cholesterol, Indigestion, Lumbar spinal stenosis, Obesity, Osteoarthritis, Sleep apnea (2019), Stented coronary artery, and Visual impairment.  Surgical History:  He  has a past surgical history that includes colonoscopy (2001, 2006, 2011); colonoscopy,diagnostic (N/A, 8/5/2016); cath angio (12/07/2018); Cardiac catheterization (12/07/2018); bypass surgery; angiogram; cabg (12/17/2018); other surgical history (08/20/2019); hernia surgery (07/10/2020); angioplasty (coronary) (Dec. 7, 2018); endovas repair, infrarenl abdom aortic aneurysm/dissect; colonoscopy; and colonoscopy (N/A, 10/22/2021).   Family History:  His family history includes Breast Cancer in his mother; Cancer in his brother, brother, father, maternal grandfather, and mother; Colon Cancer (age of onset: 70) in his father; Diabetes in his maternal grandmother; Heart Attack in his paternal grandmother; Other in his son.  Social History:  He  reports that he has quit smoking. His smoking use included cigarettes. He has never used smokeless tobacco. He reports current alcohol use. He reports that he does not use drugs.    Tobacco:  He smoked tobacco in the past but quit greater than 12 months ago.  Social History      Tobacco Use   Smoking Status Former    Types: Cigarettes   Smokeless Tobacco Never   Tobacco Comments    QUIT 35 YRS AGO          CAGE Alcohol Screen:   CAGE screening score of 0 on 8/30/2024, showing low risk of alcohol abuse.      Patient Care Team:  Marni Gruber MD as PCP - General (Family Medicine)  Ameya Benito MD (RHEUMATOLOGY)  Raghav Guillermo MD (GASTROENTEROLOGY)  Brenden Bruce MD (SURGERY, ORTHOPEDIC)  Tomas Silverman PT as Physical Therapist (Physical Therapy)  Jordy Jackson DO (GASTROENTEROLOGY)  Aneesh Bonner MD (Cardiovascular Diseases)    Review of Systems  GENERAL: feels well otherwise  SKIN: denies any unusual skin lesions  EYES: denies blurred vision or double vision  HEENT: denies nasal congestion, sinus pain or ST  LUNGS: denies shortness of breath with exertion  CARDIOVASCULAR: denies chest pain on exertion  GI: denies abdominal pain, denies heartburn  : 2 per night nocturia, no complaint of urinary incontinence  MUSCULOSKELETAL: denies back pain  NEURO: denies headaches  PSYCHE: denies depression or anxiety  HEMATOLOGIC: denies hx of anemia  ENDOCRINE: denies thyroid history  ALL/ASTHMA: denies hx of allergy or asthma    Objective:   Physical Exam  General Appearance:  Alert, cooperative, no distress, appears stated age   Head:  Normocephalic, without obvious abnormality, atraumatic   Eyes:  PERRL, conjunctiva/corneas clear, EOM's intact, both eyes   Ears:  Normal TM's and external ear canals, both ears   Nose: Nares normal, septum midline, mucosa normal, no drainage or sinus tenderness   Throat: Lips, mucosa, and tongue normal; teeth and gums normal   Neck: Supple, symmetrical, trachea midline, no adenopathy, thyroid: not enlarged, symmetric, no tenderness/mass/nodules, no carotid bruit or JVD   Back:   Symmetric, no curvature, ROM normal, no CVA tenderness   Lungs:   Clear to auscultation bilaterally, respirations unlabored   Chest Wall:  No tenderness or  deformity   Heart:  Regular rate and rhythm, S1, S2 normal, no murmur, rub or gallop   Abdomen:   Soft, non-tender, bowel sounds active all four quadrants,  no masses, no organomegaly   Genitalia: Normal male   Rectal: Normal tone, enlarged  prostate, no masses or tenderness   Extremities: Extremities normal, atraumatic, no cyanosis or edema   Pulses: 2+ and symmetric   Skin: Skin color, texture, turgor normal, no rashes or lesions   Lymph nodes: Cervical, supraclavicular, and axillary nodes normal   Neurologic: Normal     /84 (BP Location: Right arm, Patient Position: Sitting, Cuff Size: adult)   Pulse 70   Temp 96.9 °F (36.1 °C) (Temporal)   Resp 16   Ht 5' 11\" (1.803 m)   Wt 199 lb (90.3 kg)   BMI 27.75 kg/m²  Estimated body mass index is 27.75 kg/m² as calculated from the following:    Height as of this encounter: 5' 11\" (1.803 m).    Weight as of this encounter: 199 lb (90.3 kg).    Medicare Hearing Assessment:   Hearing Screening    Time taken: 8/30/2024 11:38 AM  Screening Method: Finger Rub  Finger Rub Result: Pass         Visual Acuity:   Right Eye Visual Acuity: Corrected Right Eye Chart Acuity: 20/30   Left Eye Visual Acuity: Corrected Left Eye Chart Acuity: 20/70   Both Eyes Visual Acuity: Corrected Both Eyes Chart Acuity: 20/25   Able To Tolerate Visual Acuity: Yes        Assessment & Plan:   Cheng Zurita is a 69 year old male who presents for a Medicare Assessment.     1. Encounter for annual health examination (Primary)  2. Pure hypercholesterolemia  -     Rosuvastatin Calcium; Take 1 tablet (40 mg total) by mouth nightly.  Dispense: 90 tablet; Refill: 1  -     Comp Metabolic Panel (14)  -     Lipid Panel  3. Coronary artery disease involving native heart without angina pectoris, unspecified vessel or lesion type  -     Metoprolol Succinate ER; TAKE 1 TABLET BY MOUTH EVERY DAY  Dispense: 90 tablet; Refill: 1  4. Essential hypertension, benign  -     Metoprolol Succinate ER; TAKE 1  TABLET BY MOUTH EVERY DAY  Dispense: 90 tablet; Refill: 1  5. Abdominal aortic aneurysm (AAA) without rupture (HCC)  -     Metoprolol Succinate ER; TAKE 1 TABLET BY MOUTH EVERY DAY  Dispense: 90 tablet; Refill: 1  6. Primary osteoarthritis involving multiple joints  -     Celecoxib; Take 1 capsule (200 mg total) by mouth daily.  Dispense: 30 capsule; Refill: 0  7. Screening for prostate cancer  -     PSA Total, Diagnostic  8. Benign prostatic hyperplasia with nocturia  9. Status post AAA (abdominal aortic aneurysm) repair  10. Family history of colon cancer requiring screening colonoscopy  11. Gastroesophageal reflux disease without esophagitis  12. SHIV on CPAP  13. S/P CABG (coronary artery bypass graft)    Hypercholesterolemia on medication continue present management refill was called in.    Coronary artery disease stable asymptomatic continue medication see cardiologist Dr. Bonner.    Hypertension blood pressure stable continue present management monitor,    Abdominal aortic aneurysm on medication stable continue present management.    Osteoarthritis multiple joints continue Celebrex refill was called in use with caution    Screening for prostate cancer PSA ordered patient will have it done    BPH with nocturia twice at night to monitor.    Status post AAA repair seeing specialist monitored by them    Family history of colon cancer colonoscopy is up-to-date doing well    GERD continue present management stable     Obstructive sleep apnea continue primary management stable    Continue current meds.   Watch diet for fats and carbs.   Stay active.    Do your flu COVID vaccination for the fall.  Consider getting RSV vaccination.  Consider getting hepatitis A vaccination for traveling.  Do fasting blood work prior visit in 6 months.    The patient indicates understanding of these issues and agrees to the plan.  Lab work ordered.  Reinforced healthy diet, lifestyle, and exercise.      Return in about 6 months (around  2/28/2025).     Marni Gruber MD, 8/30/2024     Supplementary Documentation:   General Health:  In the past six months, have you lost more than 10 pounds without trying?: 2 - No  Has your appetite been poor?: No  Type of Diet: Balanced  How does the patient maintain a good energy level?: Appropriate Exercise  How would you describe your daily physical activity?: Moderate  How would you describe your current health state?: Good  How do you maintain positive mental well-being?: Social Interaction;Visiting Family;Visiting Friends  On a scale of 0 to 10, with 0 being no pain and 10 being severe pain, what is your pain level?: 0 - (None)  In the past six months, have you experienced urine leakage?: 0-No  At any time do you feel concerned for the safety/well-being of yourself and/or your children, in your home or elsewhere?: No  Have you had any immunizations at another office such as Influenza, Hepatitis B, Tetanus, or Pneumococcal?: No    Health Maintenance   Topic Date Due    MA Annual Health Assessment  01/01/2024    Annual Depression Screening  01/01/2024    Fall Risk Screening (Annual)  01/01/2024    COVID-19 Vaccine (7 - 2023-24 season) 01/18/2024    Influenza Vaccine (1) 10/01/2024    PSA  02/14/2026    Colorectal Cancer Screening  10/22/2026    Pneumococcal Vaccine: 65+ Years  Completed    Zoster Vaccines  Completed

## 2024-09-12 ENCOUNTER — MED REC SCAN ONLY (OUTPATIENT)
Dept: FAMILY MEDICINE CLINIC | Facility: CLINIC | Age: 69
End: 2024-09-12

## 2024-09-21 ENCOUNTER — MED REC SCAN ONLY (OUTPATIENT)
Dept: FAMILY MEDICINE CLINIC | Facility: CLINIC | Age: 69
End: 2024-09-21

## 2024-10-08 DIAGNOSIS — M15.0 PRIMARY OSTEOARTHRITIS INVOLVING MULTIPLE JOINTS: ICD-10-CM

## 2024-10-08 RX ORDER — CELECOXIB 200 MG/1
200 CAPSULE ORAL DAILY
Qty: 30 CAPSULE | Refills: 3 | Status: SHIPPED | OUTPATIENT
Start: 2024-10-08

## 2024-10-08 NOTE — TELEPHONE ENCOUNTER
Did not pass protocol  Last office visit  8/30/2024  Last refill was: , 8/30/2024  30__tabs  Next appointment: 3/3/2024    Please sign pended medication if appropriate            Medication Quantity Refills Start End   celecoxib 200 MG Oral Cap 30 capsule 0 8/30/2024 --   Sig:   Take 1 capsule (200 mg total) by mouth daily.     Route:   Oral

## 2024-10-19 ENCOUNTER — OFFICE VISIT (OUTPATIENT)
Dept: FAMILY MEDICINE CLINIC | Facility: CLINIC | Age: 69
End: 2024-10-19
Payer: MEDICARE

## 2024-10-19 VITALS
DIASTOLIC BLOOD PRESSURE: 82 MMHG | TEMPERATURE: 98 F | HEIGHT: 71 IN | HEART RATE: 63 BPM | SYSTOLIC BLOOD PRESSURE: 142 MMHG | WEIGHT: 195 LBS | OXYGEN SATURATION: 100 % | BODY MASS INDEX: 27.3 KG/M2 | RESPIRATION RATE: 16 BRPM

## 2024-10-19 DIAGNOSIS — J06.9 VIRAL URI WITH COUGH: Primary | ICD-10-CM

## 2024-10-19 PROCEDURE — 99213 OFFICE O/P EST LOW 20 MIN: CPT | Performed by: NURSE PRACTITIONER

## 2024-10-19 RX ORDER — BENZONATATE 200 MG/1
200 CAPSULE ORAL 3 TIMES DAILY PRN
Qty: 30 CAPSULE | Refills: 0 | Status: SHIPPED | OUTPATIENT
Start: 2024-10-19

## 2024-10-19 NOTE — PROGRESS NOTES
CHIEF COMPLAINT:     Chief Complaint   Patient presents with    Cough     X 3 days         HPI:   Cheng Zurita is a 69 year old male who presents for upper respiratory symptoms for  3 days after returning from a cruise trip.   Patient reports congestion, clear colored nasal discharge, dry cough, cough is not keeping pt up at night, denies fever. Symptoms have been persisting since onset.  Treating symptoms with none.  Denies SOB, loss of taste or smell.  Denies n/v/d.    Current Outpatient Medications   Medication Sig Dispense Refill    benzonatate 200 MG Oral Cap Take 1 capsule (200 mg total) by mouth 3 (three) times daily as needed. 30 capsule 0    celecoxib 200 MG Oral Cap TAKE 1 CAPSULE BY MOUTH EVERY DAY 30 capsule 3    rosuvastatin 40 MG Oral Tab Take 1 tablet (40 mg total) by mouth nightly. 90 tablet 1    metoprolol succinate ER 50 MG Oral Tablet 24 Hr TAKE 1 TABLET BY MOUTH EVERY DAY 90 tablet 1    ELDERBERRY OR Take 1 each by mouth daily.      Sildenafil Citrate 50 MG Oral Tab Take 1 tablet (50 mg total) by mouth daily as needed for Erectile Dysfunction. 15 tablet 3    Multiple Vitamin (MULTIVITAMIN ADULT OR) Take 1 tablet by mouth daily.      Saw Palmetto, Serenoa repens, (SAW PALMETTO OR) Take 1 tablet by mouth daily.      Pantoprazole Sodium 40 MG Oral Tab EC TAKE 1 TABLET(40 MG) BY MOUTH EVERY DAY (Patient taking differently: Take 1 tablet (40 mg total) by mouth as needed. TAKE 1 TABLET(40 MG) BY MOUTH EVERY DAY PRN) 90 tablet 0    Coenzyme Q10 (CO Q 10 OR) Take 1 capsule by mouth daily.      aspirin 81 MG Oral Tab Take 1 tablet (81 mg total) by mouth daily.        Past Medical History:    AAA (abdominal aortic aneurysm) (HCC)    Abdominal hernia    Arthritis    Atherosclerosis of coronary artery    Coronary atherosclerosis    Esophageal reflux    Flatulence/gas pain/belching    High blood pressure    High cholesterol    Indigestion    Lumbar spinal stenosis    Obesity    Osteoarthritis     Sleep apnea    no longer using cpap    Stented coronary artery    Visual impairment    contacts/glasses      Past Surgical History:   Procedure Laterality Date    Angiogram      Angioplasty (coronary)  Dec. 7, 2018    Bypass surgery      Cabg  12/17/2018    TRIPLE BYPASS    Cardiac catheterization  12/07/2018    DR NEFF    Cath angio  12/07/2018        Colonoscopy  2001, 2006, 2011    Colonoscopy      Colonoscopy N/A 10/22/2021    Procedure: COLONOSCOPY;  Surgeon: Jordy Jackson DO;  Location:  ENDOSCOPY    Colonoscopy,diagnostic N/A 8/5/2016    Procedure: COLONOSCOPY, POSSIBLE BIOPSY, POSSIBLE POLYPECTOMY 99532;  Surgeon: Raghav Guillermo MD;  [facility]    Endovas repair, infrarenl abdom aortic aneurysm/dissect      Hernia surgery  07/10/2020    Robotic repair left inguinal hernia with mesh    Other surgical history  08/20/2019    Robotic-assisted repair right inguinal hernia with mesh         Social History     Socioeconomic History    Marital status:    Tobacco Use    Smoking status: Former     Types: Cigarettes    Smokeless tobacco: Never    Tobacco comments:     QUIT 35 YRS AGO   Vaping Use    Vaping status: Never Used   Substance and Sexual Activity    Alcohol use: Yes     Comment: around 6 drinks per week    Drug use: No   Other Topics Concern    Caffeine Concern No     Comment: none    Exercise Yes     Comment: on hold     Seat Belt Yes         REVIEW OF SYSTEMS:   GENERAL: no change in appetite  SKIN: no rashes or abnormal skin lesions  HEENT: See HPI  LUNGS: See HPI  CARDIOVASCULAR: denies chest pain or palpitations   GI: denies N/V/C or abdominal pain      EXAM:   /82   Pulse 63   Temp 97.8 °F (36.6 °C)   Resp 16   Ht 5' 11\" (1.803 m)   Wt 195 lb (88.5 kg)   SpO2 100%   BMI 27.20 kg/m²   GENERAL: well developed, well nourished,in no apparent distress  SKIN: no rashes,no suspicious lesions  HEAD: atraumatic, normocephalic.    EYES: conjunctiva  clear, EOM intact  EARS: TM's bilaterally non-erythematous, no bulging, no retraction, no fluid, bony landmarks clearly visualized  NOSE: Nostrils patent, no nasal discharge, nasal mucosa pink and non-inflamed   THROAT: Oral mucosa pink, moist. Posterior pharynx is not erythematous. No exudates. Tonsils 2/4.    NECK: Supple, non-tender  LUNGS: clear to auscultation bilaterally, no wheezes or rhonchi. Breathing is non labored.  CARDIO: RRR without murmur  EXTREMITIES: no cyanosis, clubbing or edema  LYMPH:  No lymphadenopathy.        ASSESSMENT AND PLAN:   Cheng Zurita is a 69 year old male who presents with upper respiratory symptoms that are consistent with    ASSESSMENT:   Encounter Diagnosis   Name Primary?    Viral URI with cough Yes       PLAN: Comfort care as described in Patient Instructions    Meds & Refills for this Visit:  Requested Prescriptions     Signed Prescriptions Disp Refills    benzonatate 200 MG Oral Cap 30 capsule 0     Sig: Take 1 capsule (200 mg total) by mouth 3 (three) times daily as needed.       The patient indicates understanding of these issues and agrees to the plan.  The patient is asked to f/u with PCP if sx's persist or worsen.

## 2024-11-14 ENCOUNTER — MED REC SCAN ONLY (OUTPATIENT)
Dept: FAMILY MEDICINE CLINIC | Facility: CLINIC | Age: 69
End: 2024-11-14

## 2025-02-08 DIAGNOSIS — M15.0 PRIMARY OSTEOARTHRITIS INVOLVING MULTIPLE JOINTS: ICD-10-CM

## 2025-02-11 RX ORDER — CELECOXIB 200 MG/1
200 CAPSULE ORAL DAILY
Qty: 30 CAPSULE | Refills: 0 | Status: SHIPPED | OUTPATIENT
Start: 2025-02-11

## 2025-02-11 NOTE — TELEPHONE ENCOUNTER
Last Office Visit: 08/03/2024    Last Refill:   Medication Quantity Refills Start End   celecoxib 200 MG Oral Cap 30 capsule 3 10/8/2024 --     Return to Clinic: [facility] 03/03/2025    Protocol: n/a    Refill pended. Please approve if okay. Thank you.

## 2025-02-26 ENCOUNTER — LAB ENCOUNTER (OUTPATIENT)
Dept: LAB | Age: 70
End: 2025-02-26
Attending: FAMILY MEDICINE
Payer: MEDICARE

## 2025-02-26 LAB
ALBUMIN SERPL-MCNC: 4.2 G/DL (ref 3.2–4.8)
ALBUMIN/GLOB SERPL: 1.6 {RATIO} (ref 1–2)
ALP LIVER SERPL-CCNC: 51 U/L
ALT SERPL-CCNC: 21 U/L
ANION GAP SERPL CALC-SCNC: 3 MMOL/L (ref 0–18)
AST SERPL-CCNC: 30 U/L (ref ?–34)
BILIRUB SERPL-MCNC: 1 MG/DL (ref 0.2–1.1)
BUN BLD-MCNC: 13 MG/DL (ref 9–23)
CALCIUM BLD-MCNC: 9.7 MG/DL (ref 8.7–10.6)
CHLORIDE SERPL-SCNC: 105 MMOL/L (ref 98–112)
CHOLEST SERPL-MCNC: 132 MG/DL (ref ?–200)
CO2 SERPL-SCNC: 32 MMOL/L (ref 21–32)
CREAT BLD-MCNC: 1.07 MG/DL
EGFRCR SERPLBLD CKD-EPI 2021: 76 ML/MIN/1.73M2 (ref 60–?)
FASTING PATIENT LIPID ANSWER: YES
FASTING STATUS PATIENT QL REPORTED: YES
GLOBULIN PLAS-MCNC: 2.7 G/DL (ref 2–3.5)
GLUCOSE BLD-MCNC: 91 MG/DL (ref 70–99)
HDLC SERPL-MCNC: 37 MG/DL (ref 40–59)
LDLC SERPL CALC-MCNC: 68 MG/DL (ref ?–100)
NONHDLC SERPL-MCNC: 95 MG/DL (ref ?–130)
OSMOLALITY SERPL CALC.SUM OF ELEC: 290 MOSM/KG (ref 275–295)
POTASSIUM SERPL-SCNC: 4.8 MMOL/L (ref 3.5–5.1)
PROT SERPL-MCNC: 6.9 G/DL (ref 5.7–8.2)
PSA SERPL-MCNC: 1.15 NG/ML (ref ?–4)
SODIUM SERPL-SCNC: 140 MMOL/L (ref 136–145)
TRIGL SERPL-MCNC: 154 MG/DL (ref 30–149)
VLDLC SERPL CALC-MCNC: 23 MG/DL (ref 0–30)

## 2025-02-26 PROCEDURE — 80053 COMPREHEN METABOLIC PANEL: CPT | Performed by: FAMILY MEDICINE

## 2025-02-26 PROCEDURE — 80061 LIPID PANEL: CPT | Performed by: FAMILY MEDICINE

## 2025-02-26 PROCEDURE — 36415 COLL VENOUS BLD VENIPUNCTURE: CPT | Performed by: FAMILY MEDICINE

## 2025-02-26 PROCEDURE — 84153 ASSAY OF PSA TOTAL: CPT | Performed by: FAMILY MEDICINE

## 2025-03-02 DIAGNOSIS — E78.00 PURE HYPERCHOLESTEROLEMIA: ICD-10-CM

## 2025-03-03 ENCOUNTER — OFFICE VISIT (OUTPATIENT)
Dept: FAMILY MEDICINE CLINIC | Facility: CLINIC | Age: 70
End: 2025-03-03
Payer: MEDICARE

## 2025-03-03 VITALS
SYSTOLIC BLOOD PRESSURE: 130 MMHG | HEART RATE: 64 BPM | TEMPERATURE: 97 F | RESPIRATION RATE: 18 BRPM | HEIGHT: 71 IN | WEIGHT: 206 LBS | BODY MASS INDEX: 28.84 KG/M2 | DIASTOLIC BLOOD PRESSURE: 84 MMHG

## 2025-03-03 DIAGNOSIS — I10 ESSENTIAL HYPERTENSION, BENIGN: ICD-10-CM

## 2025-03-03 DIAGNOSIS — M15.0 PRIMARY OSTEOARTHRITIS INVOLVING MULTIPLE JOINTS: ICD-10-CM

## 2025-03-03 DIAGNOSIS — E78.00 PURE HYPERCHOLESTEROLEMIA: Primary | ICD-10-CM

## 2025-03-03 DIAGNOSIS — I25.10 CORONARY ARTERY DISEASE INVOLVING NATIVE HEART WITHOUT ANGINA PECTORIS, UNSPECIFIED VESSEL OR LESION TYPE: ICD-10-CM

## 2025-03-03 DIAGNOSIS — I71.40 ABDOMINAL AORTIC ANEURYSM (AAA) WITHOUT RUPTURE: ICD-10-CM

## 2025-03-03 DIAGNOSIS — Z12.5 PROSTATE CANCER SCREENING: ICD-10-CM

## 2025-03-03 DIAGNOSIS — N52.9 ERECTILE DYSFUNCTION, UNSPECIFIED ERECTILE DYSFUNCTION TYPE: ICD-10-CM

## 2025-03-03 PROCEDURE — 1160F RVW MEDS BY RX/DR IN RCRD: CPT | Performed by: FAMILY MEDICINE

## 2025-03-03 PROCEDURE — G2211 COMPLEX E/M VISIT ADD ON: HCPCS | Performed by: FAMILY MEDICINE

## 2025-03-03 PROCEDURE — 1159F MED LIST DOCD IN RCRD: CPT | Performed by: FAMILY MEDICINE

## 2025-03-03 PROCEDURE — 99214 OFFICE O/P EST MOD 30 MIN: CPT | Performed by: FAMILY MEDICINE

## 2025-03-03 RX ORDER — ROSUVASTATIN CALCIUM 40 MG/1
40 TABLET, COATED ORAL NIGHTLY
Qty: 90 TABLET | Refills: 1 | Status: SHIPPED | OUTPATIENT
Start: 2025-03-03

## 2025-03-03 RX ORDER — SILDENAFIL 50 MG/1
50 TABLET, FILM COATED ORAL
Qty: 30 TABLET | Refills: 1 | Status: SHIPPED | OUTPATIENT
Start: 2025-03-03

## 2025-03-03 RX ORDER — ROSUVASTATIN CALCIUM 40 MG/1
40 TABLET, COATED ORAL NIGHTLY
Qty: 90 TABLET | Refills: 1 | OUTPATIENT
Start: 2025-03-03

## 2025-03-03 RX ORDER — METOPROLOL SUCCINATE 50 MG/1
TABLET, EXTENDED RELEASE ORAL
Qty: 90 TABLET | Refills: 1 | Status: SHIPPED | OUTPATIENT
Start: 2025-03-03

## 2025-03-03 RX ORDER — CELECOXIB 200 MG/1
200 CAPSULE ORAL DAILY
Qty: 90 CAPSULE | Refills: 1 | Status: SHIPPED | OUTPATIENT
Start: 2025-03-03

## 2025-03-03 NOTE — PATIENT INSTRUCTIONS
Continue current meds.   Watch diet for fats and carbs.   Stay active.    Check fasting  blood work prior next visit.        Refill policies:      Allow 3 business days for refills; controlled substances may take longer.  Contact your pharmacy at least 5-7 business days prior to running out of medication and have them send an electronic request or submit through the \"request refill\" option thru your [employer] account. No need to do both, as multiple requests will create an automated [employer] message to notify of a denial for one of the duplicated requests, causing you undue confusion.   Refills are NOT addressed on weekends; covering physicians do not authorize routine medications on weekends.  No narcotics or controlled substances are refilled after noon on Fridays or by on call physicians.  By law, narcotics cannot be faxed or phoned into your pharmacy.  If your prescription is due for a refill, you may be due for a follow up appointment. Please call our office at [phone] to make an appointment or schedule an appointment via [employer].  To best provide you care, patients receiving routine medications need to be seen at least twice a year. Patients receiving narcotic/controlled substance medications need to be seen at least once every 3 months.  In the event that your preferred pharmacy does not have the requested medication in stock (ie Backordered), it is your responsibility to find another pharmacy that has the requested medication available. We will gladly send a new prescription to that pharmacy at your request.  controlled substances may not be able to be filled out of state due to license restrictions.  If you have a planned trip, it's best to call your pharmacy at least 5-7 business days to prevent any delays in your medication refill.    Scheduling Tests:    If your physician has ordered radiology tests such as MRI or CT scans, please contact Central Scheduling at [phone] right away to schedule the  test.  Once scheduled, the [facility] Centralized Referral Team will work with your insurance carrier to obtain pre-certification or prior authorization.  Depending on your insurance carrier, approval may take 3-10 days.  It is highly recommended patients assure they have received an authorization before having a test performed.  If test is done without insurance authorization, patient may be responsible for the entire amount billed.      Precertification and Prior Authorizations:  If your physician has recommended that you have a procedure or additional testing performed the [facility] Centralized Referral Team will contact your insurance carrier to obtain pre-certification or prior authorization.    You are strongly encouraged to contact your insurance carrier to verify that your procedure/test has been approved and is a COVERED benefit.  Although the [facility] Centralized Referral Team does its due diligence, the insurance carrier gives the disclaimer that \"Although the procedure is authorized, this does not guarantee payment.\"    Ultimately the patient is responsible for payment.   Thank you for your understanding in this matter.  Paperwork Completion:  If you require FMLA or disability paperwork for your recovery, please make sure to either drop it off or have it faxed to our office at [phone]. Be sure the form has your name and date of birth on it.  The form will be faxed to our Forms Department and they will complete it for you.  There is a 25$ fee for all forms that need to be filled out.  Please be aware there is a 10-14 day turnaround time.  You will need to sign a release of information (BOZENA) form if your paperwork does not come with one.  You may call the Forms Department with any questions at [phone].  Their fax number is [phone].

## 2025-03-03 NOTE — TELEPHONE ENCOUNTER
LOV: 8/30/2024  for: Ma-Super visit.   Patient advised to RTC on: 6 months (around 2/28/2025)    Nov:03/03/2025    Medication Quantity Refills Start End   rosuvastatin 40 MG Oral Tab 90 tablet 1 8/30/2024 --   Sig:   Take 1 tablet (40 mg total) by mouth nightly.

## 2025-03-03 NOTE — PROGRESS NOTES
Cheng Zurita is a 69 year old male.  Hypertension, hyperlipidemia, coronary artery disease, aortic aneurysm, osteoarthritis  HPI:   Hypertension patient is taking metoprolol doing well with medication.  Blood pressure is stable doing well denies any chest pain no shortness of breath no palpitations    Hypercholesterolemia patient is taking rosuvastatin 40 mg daily doing well with medication.  Previously was on atorvastatin 40 mg daily HDL came back low and he is exercising 4 times per week.  He will try to do some activity at home later after lunch.  Patient had a blood work done which came back  good numbers are stable continue current medication refill called in    Coronary artery disease patient had 2 bypass CABG in December 2018.  He had LIMA to LAD, saphenous vein graft to the diagonal and posterior descending.  Patient is doing well.  Denies any chest pain no shortness of breath.  Patient is seeing Dr. Patino cardiologist    Patient was diagnosed with abdominal aortic aneurysm on June 25, 2021 which was repaired.  He is continuing to see cardiologist Dr. Bonner and vascular surgeon Dr. Addy Morton.  Had CTA of the abdomen which shows enlargement of the aneurysmal sac to 5.5 cm likely from type II endoleak.  Patient was recommended to see Dr. Aguirre and has repair of the aneurysm completed.  It is monitored by cardiology.      ED- using sildenafil helpful refill called in.    Patient has osteoarthritis in his thumbs.  He is taking Celebrex tolerates medication well.  Notices difference when the medication is stopped.  Would like refill of the medication which was called in.    Colonoscopy was on October 22, 2021.    Current Outpatient Medications   Medication Sig Dispense Refill    rosuvastatin 40 MG Oral Tab Take 1 tablet (40 mg total) by mouth nightly. 90 tablet 1    metoprolol succinate ER 50 MG Oral Tablet 24 Hr TAKE 1 TABLET BY MOUTH EVERY DAY 90 tablet 1    Sildenafil Citrate 50 MG Oral Tab  Take 1 tablet (50 mg total) by mouth daily as needed for Erectile Dysfunction. 30 tablet 1    celecoxib 200 MG Oral Cap Take 1 capsule (200 mg total) by mouth daily. 90 capsule 1    benzonatate 200 MG Oral Cap Take 1 capsule (200 mg total) by mouth 3 (three) times daily as needed. 30 capsule 0    Multiple Vitamin (MULTIVITAMIN ADULT OR) Take 1 tablet by mouth daily.      Saw Palmetto, Serenoa repens, (SAW PALMETTO OR) Take 1 tablet by mouth daily.      Pantoprazole Sodium 40 MG Oral Tab EC TAKE 1 TABLET(40 MG) BY MOUTH EVERY DAY 90 tablet 0    Coenzyme Q10 (CO Q 10 OR) Take 1 capsule by mouth daily.      aspirin 81 MG Oral Tab Take 1 tablet (81 mg total) by mouth daily.        Past Medical History:    AAA (abdominal aortic aneurysm)    Abdominal hernia    Arthritis    Atherosclerosis of coronary artery    Coronary atherosclerosis    Esophageal reflux    Flatulence/gas pain/belching    High blood pressure    High cholesterol    Indigestion    Lumbar spinal stenosis    Obesity    Osteoarthritis    Sleep apnea    no longer using cpap    Stented coronary artery    Visual impairment    contacts/glasses      Social History:  Social History     Socioeconomic History    Marital status:    Tobacco Use    Smoking status: Former     Types: Cigarettes    Smokeless tobacco: Never    Tobacco comments:     QUIT 35 YRS AGO   Vaping Use    Vaping status: Never Used   Substance and Sexual Activity    Alcohol use: Yes     Comment: around 6 drinks per week    Drug use: No   Other Topics Concern    Caffeine Concern No     Comment: none    Exercise Yes     Comment: on hold     Seat Belt Yes        REVIEW OF SYSTEMS:   GENERAL HEALTH: feels well otherwise  SKIN: denies any unusual skin lesions or rashes  HEENT no congestion no runny nose no sore throat  Neck no neck pain  RESPIRATORY: denies shortness of breath with exertion  CARDIOVASCULAR: denies chest pain on exertion  GI: denies abdominal pain and denies heartburn  NEURO:  denies headaches  Psych normal mood.    EXAM:   /84 (BP Location: Left arm, Patient Position: Sitting, Cuff Size: adult)   Pulse 64   Temp 96.8 °F (36 °C) (Temporal)   Resp 18   Ht 5' 11\" (1.803 m)   Wt 206 lb (93.4 kg)   BMI 28.73 kg/m²   GENERAL: well developed, well nourished,in no apparent distress  SKIN: no rashes,no suspicious lesions  HEENT: atraumatic, normocephalic,ears and throat are clear  NECK: supple,no adenopathy,  LUNGS: clear to auscultation  CARDIO: RRR without murmur  GI: good BS's,no masses, HSM or tenderness  EXTREMITIES: no edema  Psychiatric - alert  and oriented x3, normal mood     Results for orders placed or performed in visit on 08/30/24   Comp Metabolic Panel (14)    Collection Time: 02/26/25  7:25 AM   Result Value Ref Range    Glucose 91 70 - 99 mg/dL    Sodium 140 136 - 145 mmol/L    Potassium 4.8 3.5 - 5.1 mmol/L    Chloride 105 98 - 112 mmol/L    CO2 32.0 21.0 - 32.0 mmol/L    Anion Gap 3 0 - 18 mmol/L    BUN 13 9 - 23 mg/dL    Creatinine 1.07 0.70 - 1.30 mg/dL    Calcium, Total 9.7 8.7 - 10.6 mg/dL    Calculated Osmolality 290 275 - 295 mOsm/kg    eGFR-Cr 76 >=60 mL/min/1.73m2    AST 30 <34 U/L    ALT 21 10 - 49 U/L    Alkaline Phosphatase 51 45 - 117 U/L    Bilirubin, Total 1.0 0.2 - 1.1 mg/dL    Total Protein 6.9 5.7 - 8.2 g/dL    Albumin 4.2 3.2 - 4.8 g/dL    Globulin  2.7 2.0 - 3.5 g/dL    A/G Ratio 1.6 1.0 - 2.0    Patient Fasting for CMP? Yes    Lipid Panel    Collection Time: 02/26/25  7:25 AM   Result Value Ref Range    Cholesterol, Total 132 <200 mg/dL    HDL Cholesterol 37 (L) 40 - 59 mg/dL    Triglycerides 154 (H) 30 - 149 mg/dL    LDL Cholesterol 68 <100 mg/dL    VLDL 23 0 - 30 mg/dL    Non HDL Chol 95 <130 mg/dL    Patient Fasting for Lipid? Yes    PSA - Total [5363][Q]    Collection Time: 02/26/25  7:25 AM   Result Value Ref Range    Total PSA  1.15 <=4.00 ng/mL     ASSESSMENT AND PLAN:     Encounter Diagnoses   Name Primary?    Pure hypercholesterolemia Yes     Coronary artery disease involving native heart without angina pectoris, unspecified vessel or lesion type     Essential hypertension, benign     Abdominal aortic aneurysm (AAA) without rupture     Erectile dysfunction, unspecified erectile dysfunction type     Primary osteoarthritis involving multiple joints     Prostate cancer screening        Orders Placed This Encounter   Procedures    CBC With Differential With Platelet    Comp Metabolic Panel (14)    Lipid Panel    Urinalysis with Culture Reflex    TSH W Reflex To Free T4       Meds & Refills for this Visit:  Requested Prescriptions     Signed Prescriptions Disp Refills    rosuvastatin 40 MG Oral Tab 90 tablet 1     Sig: Take 1 tablet (40 mg total) by mouth nightly.    metoprolol succinate ER 50 MG Oral Tablet 24 Hr 90 tablet 1     Sig: TAKE 1 TABLET BY MOUTH EVERY DAY    Sildenafil Citrate 50 MG Oral Tab 30 tablet 1     Sig: Take 1 tablet (50 mg total) by mouth daily as needed for Erectile Dysfunction.    celecoxib 200 MG Oral Cap 90 capsule 1     Sig: Take 1 capsule (200 mg total) by mouth daily.   Continue current meds.   Watch diet for fats and carbs.   Stay active.    Check fasting  blood work prior next visit.    Imaging & Consults:  None    The patient indicates understanding of these issues and agrees to the plan.  The patient is asked to return in August 2025 for super visit.  The note was dictated using speech recognition software.  Accuracy and grammar in transcription may be subject to error.

## 2025-04-08 ENCOUNTER — LAB ENCOUNTER (OUTPATIENT)
Dept: LAB | Facility: HOSPITAL | Age: 70
End: 2025-04-08
Attending: RADIOLOGY
Payer: MEDICARE

## 2025-04-08 DIAGNOSIS — I71.40 ABDOMINAL AORTIC ANEURYSM (AAA) WITHOUT RUPTURE, UNSPECIFIED PART: ICD-10-CM

## 2025-04-08 DIAGNOSIS — I10 PRIMARY HYPERTENSION: Primary | ICD-10-CM

## 2025-04-08 DIAGNOSIS — E78.5 HYPERLIPIDEMIA, UNSPECIFIED HYPERLIPIDEMIA TYPE: ICD-10-CM

## 2025-04-08 LAB
ANION GAP SERPL CALC-SCNC: 7 MMOL/L (ref 0–18)
BUN BLD-MCNC: 17 MG/DL (ref 9–23)
CALCIUM BLD-MCNC: 9.4 MG/DL (ref 8.7–10.6)
CHLORIDE SERPL-SCNC: 105 MMOL/L (ref 98–112)
CO2 SERPL-SCNC: 29 MMOL/L (ref 21–32)
CREAT BLD-MCNC: 1.01 MG/DL
EGFRCR SERPLBLD CKD-EPI 2021: 81 ML/MIN/1.73M2 (ref 60–?)
FASTING STATUS PATIENT QL REPORTED: YES
GLUCOSE BLD-MCNC: 102 MG/DL (ref 70–99)
OSMOLALITY SERPL CALC.SUM OF ELEC: 294 MOSM/KG (ref 275–295)
POTASSIUM SERPL-SCNC: 4.5 MMOL/L (ref 3.5–5.1)
SODIUM SERPL-SCNC: 141 MMOL/L (ref 136–145)

## 2025-04-08 PROCEDURE — 36415 COLL VENOUS BLD VENIPUNCTURE: CPT

## 2025-04-08 PROCEDURE — 80048 BASIC METABOLIC PNL TOTAL CA: CPT

## 2025-04-09 ENCOUNTER — HOSPITAL ENCOUNTER (OUTPATIENT)
Dept: CT IMAGING | Facility: HOSPITAL | Age: 70
Discharge: HOME | End: 2025-04-09
Attending: RADIOLOGY
Payer: MEDICARE

## 2025-04-09 ENCOUNTER — APPOINTMENT (OUTPATIENT)
Dept: LAB | Facility: HOSPITAL | Age: 70
End: 2025-04-09
Attending: RADIOLOGY
Payer: MEDICARE

## 2025-04-09 DIAGNOSIS — E78.5 HYPERLIPIDEMIA: ICD-10-CM

## 2025-04-09 DIAGNOSIS — I71.40 ABDOMINAL AORTIC ANEURYSM, WITHOUT RUPTURE, UNSPECIFIED: ICD-10-CM

## 2025-04-09 DIAGNOSIS — I10 ESSENTIAL HYPERTENSION: ICD-10-CM

## 2025-04-09 PROCEDURE — 74174 CTA ABD&PLVS W/CONTRAST: CPT | Performed by: RADIOLOGY

## 2025-04-16 ENCOUNTER — TELEPHONE (OUTPATIENT)
Dept: FAMILY MEDICINE CLINIC | Facility: CLINIC | Age: 70
End: 2025-04-16

## 2025-04-16 DIAGNOSIS — N32.89 BLADDER WALL THICKENING: Primary | ICD-10-CM

## 2025-04-16 NOTE — TELEPHONE ENCOUNTER
I have received CT abdomen pelvis from patient cardiologist.  Patient has bladder wall thickening and a large prostate.  We do have patient to do the urinalysis microscopic reflex culture for evaluation please ask if he has any urinary symptoms?  We may have him to see urologist  evaluation of bladder wall thickening.  Please give him contact information.  Thank you

## 2025-04-17 NOTE — TELEPHONE ENCOUNTER
Patient informed of Dr. Gruber's note below.  Denies any urinary symptoms. No fever. No blood in urine noted. UA with culture reflex order placed sent to [facility] lab. Urology referral placed. Provided information to patient. He verbalized understanding and agreed with plan.

## 2025-04-18 ENCOUNTER — LAB ENCOUNTER (OUTPATIENT)
Dept: LAB | Age: 70
End: 2025-04-18
Attending: FAMILY MEDICINE
Payer: MEDICARE

## 2025-04-18 LAB
BILIRUB UR QL STRIP.AUTO: NEGATIVE
CLARITY UR REFRACT.AUTO: CLEAR
GLUCOSE UR STRIP.AUTO-MCNC: NORMAL MG/DL
KETONES UR STRIP.AUTO-MCNC: NEGATIVE MG/DL
LEUKOCYTE ESTERASE UR QL STRIP.AUTO: NEGATIVE
NITRITE UR QL STRIP.AUTO: NEGATIVE
PH UR STRIP.AUTO: 5.5 [PH] (ref 5–8)
PROT UR STRIP.AUTO-MCNC: NEGATIVE MG/DL
RBC UR QL AUTO: NEGATIVE
SP GR UR STRIP.AUTO: 1.02 (ref 1–1.03)
UROBILINOGEN UR STRIP.AUTO-MCNC: NORMAL MG/DL

## 2025-04-18 PROCEDURE — 81003 URINALYSIS AUTO W/O SCOPE: CPT | Performed by: FAMILY MEDICINE

## 2025-04-22 ENCOUNTER — MED REC SCAN ONLY (OUTPATIENT)
Dept: FAMILY MEDICINE CLINIC | Facility: CLINIC | Age: 70
End: 2025-04-22

## 2025-06-04 NOTE — H&P
HPI:     Cheng Zurita is a 69 year old male with a PMH of HTN, HL, SHIV, GERD, CAD, AAA, OA.  He presents as a consult with:  1. BPH/LUTS  2. bladder wall thickening  3. Fam h/o kidney cancer  - dad    PCP - Mariajose    He is friends with Dr Diaz    Had CT AP on 4/9/25 for AAA with findings below.    He feels well. Appetite and energy are good.    Reports 1 y h/o LUTS which is stable  Prior BPH/OAB meds: saw palmetto a couple y ago    AUA SS is 24/35 with 3 n, s, w, I, JUANA; 2 u, f. Mostly unhappy with LUTS.  Incontinence: none  Penoscrotal: no abnormalities  NICKO: ~ 40 g prostate, no nodules or tenderness    UA is negative and PVR is zero    UTI hx: none  Gross hematuria: none  Tobacco hx: ~ 10 pack years, quit ~ 2000  Kidney stone hx: none    20% potency. Viagra helps.    PSA 1.15 2/26/25    CT AP 4/9/25: BWT, small L renal cyst, diverticulosis, AAA    Drinks ~ 40 oz water with light yellow urine. I encouraged the pt drink at least 40-60 oz water per day or enough to keep urine clear to pale yellow to help with LUTS.    We discussed that bladder wall thickening is a non-specific finding and may be 2/2 BPH. Studies have shown that up to 5% of patients with BWT alone may have underlying bladder malignancy and cystoscopy is typically recommended for this. We discussed the risks and benefits to cystoscopy and the pt would like to do this.    We discussed both flomax and proscar as options for LUTS and reviewed SEs (including low risk of hypotension with flomax and possible sexual side effects with both medications). He would like to try both.    Will start joi/pro for BPH/LUTS. Continue viagra prn. RTC for office cysto and urinary symptom check.    HISTORY:  Past Medical History[1]   Past Surgical History[2]   Family History[3]   Social History: Short Social Hx on File[4]     Medications (Active prior to today's visit):  Current Medications[5]    Allergies:  Allergies[6]      ROS:     A comprehensive  10 point review of systems was completed.  Pertinent positives and negatives noted in the the HPI.    PHYSICAL EXAM:     GENERAL APPEARANCE: well, developed, well nourished, in no acute distress  NEUROLOGIC: nonfocal, alert and oriented  HEAD: normocephalic, atraumatic  EYES: sclera non-icteric  EARS: hearing intact  ORAL CAVITY: mucosa moist  NECK/THYROID: no obvious goiter or masses  LUNGS: nonlabored breathing  ABDOMEN: soft, no obvious masses or tenderness  SKIN: no obvious rashes    : as noted above    ASSESSMENT/PLAN:   Diagnoses and all orders for this visit:    Bladder wall thickening  -     URINALYSIS, AUTO, W/O SCOPE    BPH with obstruction/lower urinary tract symptoms  -     tamsulosin 0.4 MG Oral Cap; Take 1 capsule (0.4 mg total) by mouth every evening.  -     finasteride 5 MG Oral Tab; Take 1 tablet (5 mg total) by mouth daily.    Nocturia      - as noted above.    Thanks again for this consult.    Uriel Tierney MD, FACS  Urologist  [facility]  Office: [phone]              [1]   Past Medical History:   AAA (abdominal aortic aneurysm)    Abdominal hernia    Arthritis    Atherosclerosis of coronary artery    Coronary atherosclerosis    Esophageal reflux    Flatulence/gas pain/belching    High blood pressure    High cholesterol    Indigestion    Lumbar spinal stenosis    Obesity    Osteoarthritis    Sleep apnea    no longer using cpap    Stented coronary artery    Visual impairment    contacts/glasses   [2]   Past Surgical History:  Procedure Laterality Date    Angiogram      Angioplasty (coronary)  Dec. 7, 2018    Bypass surgery      Cabg  12/17/2018    TRIPLE BYPASS    Cardiac catheterization  12/07/2018    DR NEFF    Cath angio  12/07/2018        Colonoscopy  2001, 2006, 2011    Colonoscopy      Colonoscopy N/A 10/22/2021    Procedure: COLONOSCOPY;  Surgeon: Jordy Jackson DO;  Location:  ENDOSCOPY    Colonoscopy,diagnostic N/A 8/5/2016    Procedure: COLONOSCOPY,  POSSIBLE BIOPSY, POSSIBLE POLYPECTOMY 85057;  Surgeon: Raghav Guillermo MD;  [facility] [facility]    Endovas repair, infrarenl abdom aortic aneurysm/dissect      Hernia surgery  07/10/2020    Robotic repair left inguinal hernia with mesh    Other surgical history  08/20/2019    Robotic-assisted repair right inguinal hernia with mesh   [3]   Family History  Problem Relation Age of Onset    Colon Cancer Father 70    Cancer Father         started as colon cancer-moved to bone then tumor on kidney    Cancer Mother         breast cancer. mets \"all over\"    Breast Cancer Mother     Other (Other) Son         post-op complications passed away age 15    Diabetes Maternal Grandmother     Cancer Maternal Grandfather     Heart Attack Paternal Grandmother     Cancer Brother         lymphoma    Cancer Brother         lung ca   [4]   Social History  Socioeconomic History    Marital status:    Tobacco Use    Smoking status: Former     Types: Cigarettes    Smokeless tobacco: Never    Tobacco comments:     QUIT 35 YRS AGO   Vaping Use    Vaping status: Never Used   Substance and Sexual Activity    Alcohol use: Yes     Comment: around 6 drinks per week    Drug use: No    Sexual activity: Yes     Partners: Female   Other Topics Concern    Caffeine Concern No     Comment: none    Exercise Yes     Comment: on hold     Seat Belt Yes   [5]   Current Outpatient Medications   Medication Sig Dispense Refill    tamsulosin 0.4 MG Oral Cap Take 1 capsule (0.4 mg total) by mouth every evening. 90 capsule 6    finasteride 5 MG Oral Tab Take 1 tablet (5 mg total) by mouth daily. 90 tablet 6    rosuvastatin 40 MG Oral Tab Take 1 tablet (40 mg total) by mouth nightly. 90 tablet 1    metoprolol succinate ER 50 MG Oral Tablet 24 Hr TAKE 1 TABLET BY MOUTH EVERY DAY 90 tablet 1    Sildenafil Citrate 50 MG Oral Tab Take 1 tablet (50 mg total) by mouth daily as needed for Erectile Dysfunction. 30 tablet 1    celecoxib 200 MG Oral Cap  Take 1 capsule (200 mg total) by mouth daily. 90 capsule 1    benzonatate 200 MG Oral Cap Take 1 capsule (200 mg total) by mouth 3 (three) times daily as needed. 30 capsule 0    Multiple Vitamin (MULTIVITAMIN ADULT OR) Take 1 tablet by mouth daily.      Saw Palmetto, Serenoa repens, (SAW PALMETTO OR) Take 1 tablet by mouth daily.      Pantoprazole Sodium 40 MG Oral Tab EC TAKE 1 TABLET(40 MG) BY MOUTH EVERY DAY 90 tablet 0    Coenzyme Q10 (CO Q 10 OR) Take 1 capsule by mouth daily.      aspirin 81 MG Oral Tab Take 1 tablet (81 mg total) by mouth daily.     [6]   Allergies  Allergen Reactions    Hydrocodone NAUSEA ONLY

## 2025-06-11 ENCOUNTER — OFFICE VISIT (OUTPATIENT)
Dept: SURGERY | Facility: CLINIC | Age: 70
End: 2025-06-11
Payer: MEDICARE

## 2025-06-11 DIAGNOSIS — R35.1 NOCTURIA: ICD-10-CM

## 2025-06-11 DIAGNOSIS — N13.8 BPH WITH OBSTRUCTION/LOWER URINARY TRACT SYMPTOMS: ICD-10-CM

## 2025-06-11 DIAGNOSIS — N32.89 BLADDER WALL THICKENING: Primary | ICD-10-CM

## 2025-06-11 DIAGNOSIS — N40.1 BPH WITH OBSTRUCTION/LOWER URINARY TRACT SYMPTOMS: ICD-10-CM

## 2025-06-11 LAB
APPEARANCE: CLEAR
BILIRUBIN: NEGATIVE
GLUCOSE (URINE DIPSTICK): NEGATIVE MG/DL
LEUKOCYTES: NEGATIVE
MULTISTIX LOT#: ABNORMAL NUMERIC
NITRITE, URINE: NEGATIVE
PH, URINE: 5.5 (ref 4.5–8)
PROTEIN (URINE DIPSTICK): NEGATIVE MG/DL
SPECIFIC GRAVITY: 1.02 (ref 1–1.03)
URINE-COLOR: YELLOW
UROBILINOGEN,SEMI-QN: 0.2 MG/DL (ref 0–1.9)

## 2025-06-11 PROCEDURE — 51798 US URINE CAPACITY MEASURE: CPT | Performed by: UROLOGY

## 2025-06-11 PROCEDURE — 99204 OFFICE O/P NEW MOD 45 MIN: CPT | Performed by: UROLOGY

## 2025-06-11 PROCEDURE — 1160F RVW MEDS BY RX/DR IN RCRD: CPT | Performed by: UROLOGY

## 2025-06-11 PROCEDURE — 1159F MED LIST DOCD IN RCRD: CPT | Performed by: UROLOGY

## 2025-06-11 PROCEDURE — G2211 COMPLEX E/M VISIT ADD ON: HCPCS | Performed by: UROLOGY

## 2025-06-11 PROCEDURE — 81003 URINALYSIS AUTO W/O SCOPE: CPT | Performed by: UROLOGY

## 2025-06-11 RX ORDER — FINASTERIDE 5 MG/1
5 TABLET, FILM COATED ORAL DAILY
Qty: 90 TABLET | Refills: 6 | Status: SHIPPED | OUTPATIENT
Start: 2025-06-11

## 2025-06-11 RX ORDER — TAMSULOSIN HYDROCHLORIDE 0.4 MG/1
0.4 CAPSULE ORAL EVERY EVENING
Qty: 90 CAPSULE | Refills: 6 | Status: SHIPPED | OUTPATIENT
Start: 2025-06-11

## 2025-06-17 ENCOUNTER — PROCEDURE (OUTPATIENT)
Dept: SURGERY | Facility: CLINIC | Age: 70
End: 2025-06-17
Payer: MEDICARE

## 2025-06-17 DIAGNOSIS — N13.8 BPH WITH OBSTRUCTION/LOWER URINARY TRACT SYMPTOMS: Primary | ICD-10-CM

## 2025-06-17 DIAGNOSIS — N40.1 BPH WITH OBSTRUCTION/LOWER URINARY TRACT SYMPTOMS: Primary | ICD-10-CM

## 2025-06-17 DIAGNOSIS — R35.1 NOCTURIA: ICD-10-CM

## 2025-06-17 DIAGNOSIS — N32.89 BLADDER WALL THICKENING: ICD-10-CM

## 2025-06-17 LAB
APPEARANCE: CLEAR
BILIRUBIN: NEGATIVE
GLUCOSE (URINE DIPSTICK): NEGATIVE MG/DL
KETONES (URINE DIPSTICK): NEGATIVE MG/DL
LEUKOCYTES: NEGATIVE
MULTISTIX LOT#: NORMAL NUMERIC
NITRITE, URINE: NEGATIVE
OCCULT BLOOD: NEGATIVE
PH, URINE: 5.5 (ref 4.5–8)
PROTEIN (URINE DIPSTICK): NEGATIVE MG/DL
SPECIFIC GRAVITY: 1.01 (ref 1–1.03)
URINE-COLOR: YELLOW
UROBILINOGEN,SEMI-QN: 0.2 MG/DL (ref 0–1.9)

## 2025-06-17 PROCEDURE — 99213 OFFICE O/P EST LOW 20 MIN: CPT | Performed by: UROLOGY

## 2025-06-17 PROCEDURE — 52000 CYSTOURETHROSCOPY: CPT | Performed by: UROLOGY

## 2025-06-17 PROCEDURE — 1160F RVW MEDS BY RX/DR IN RCRD: CPT | Performed by: UROLOGY

## 2025-06-17 PROCEDURE — 1159F MED LIST DOCD IN RCRD: CPT | Performed by: UROLOGY

## 2025-06-17 PROCEDURE — 81003 URINALYSIS AUTO W/O SCOPE: CPT | Performed by: UROLOGY

## 2025-06-17 RX ORDER — SULFAMETHOXAZOLE AND TRIMETHOPRIM 800; 160 MG/1; MG/1
1 TABLET ORAL ONCE
Status: COMPLETED | OUTPATIENT
Start: 2025-06-17 | End: 2025-06-17

## 2025-06-17 RX ADMIN — SULFAMETHOXAZOLE AND TRIMETHOPRIM 1 TABLET: 800; 160 TABLET ORAL at 12:53:00

## 2025-06-17 NOTE — PROGRESS NOTES
HPI:     Cheng Zurita is a 69 year old male with a PMH of HTN, HL, SHIV, GERD, CAD, AAA, OA.  He presents as a consult with:  1. BPH/LUTS  - joi/pro 6/11/25  2. bladder wall thickening  3. Fam h/o kidney cancer  - dad    PCP - Mariajose    He is friends with Dr Diaz    Had CT AP on 4/9/25 for AAA with findings below.    He feels well. Appetite and energy are good.  He is taking joi/pro and urinary symptoms are better.    AUA SS is 8/35 with 2 n; 1 for e/e. Was 24/35 with 3 n, s, w, I, JUANA; 2 u, f. Mostly happy with LUTS.  Incontinence: none  Penoscrotal: no abnormalities  NICKO: ~ 40 g prostate, no nodules or tenderness    UA is negative and PVR is zero    UTI hx: none  Gross hematuria: none  Tobacco hx: ~ 10 pack years, quit ~ 2000  Kidney stone hx: none    20% potency. Viagra helps.    PSA 1.15 2/26/25    CT AP 4/9/25: BWT, small L renal cyst, diverticulosis, AAA    Drinks ~ 40 oz water with light yellow urine. I encouraged the pt drink at least 40-60 oz water per day or enough to keep urine clear to pale yellow to help with LUTS.    We discussed that bladder wall thickening is a non-specific finding and may be 2/2 BPH. Studies have shown that up to 5% of patients with BWT alone may have underlying bladder malignancy and cystoscopy is typically recommended for this. We discussed the risks and benefits to cystoscopy and the pt would like to do this.    Cysto today: mod BPH with mod BOOGIE, mod bladder trabeculation.    Will continue joi/pro for BPH/LUTS. Continue viagra prn.   F/u in 6 mo for check-up with PVR.    PROCEDURE NOTE    PROCEDURE PERFORMED: Flexible Cystoscopy    After informed consent and urinalysis was obtained, he was placed in the supine position and prepped and draped in the usual sterile fashion using Betadine. Local anesthesia was induced by the introduction of 2% Lidocaine jelly per urethra.  A 16 Persian flexible scope was passed through the anterior urethra, the posterior urethra  and prostate were negotiated and the bladder was entered. The entirety of the bladder was examined and the scope was retroflexed to examine the bladder neck.     Findings: as noted above    The patient tolerated the procedure well, suffered no complications, was able to void spontaneously after completion of the procedure in the office, and left the office in good condition.    Imani-procedural antibiotics were given.  _________________________________      HISTORY:  Past Medical History[1]   Past Surgical History[2]   Family History[3]   Social History: Short Social Hx on File[4]     Medications (Active prior to today's visit):  Current Medications[5]    Allergies:  Allergies[6]      ROS:     A comprehensive 10 point review of systems was completed.  Pertinent positives and negatives noted in the the HPI.    PHYSICAL EXAM:     GENERAL APPEARANCE: well, developed, well nourished, in no acute distress  NEUROLOGIC: nonfocal, alert and oriented  HEAD: normocephalic, atraumatic  EYES: sclera non-icteric  EARS: hearing intact  ORAL CAVITY: mucosa moist  NECK/THYROID: no obvious goiter or masses  LUNGS: nonlabored breathing  ABDOMEN: soft, no obvious masses or tenderness  SKIN: no obvious rashes    : as noted above    ASSESSMENT/PLAN:   Diagnoses and all orders for this visit:    BPH with obstruction/lower urinary tract symptoms  -     sulfamethoxazole-trimethoprim DS (Bactrim DS) 800-160 MG per tab 1 tablet  -     URINALYSIS, AUTO, W/O SCOPE  -     CYSTOURETHROSCOPY    Bladder wall thickening    Nocturia    - as noted above.    Thanks again for this consult.    Uriel Tierney MD, FACS  Urologist  [facility]  Office: [phone]              [1]   Past Medical History:   AAA (abdominal aortic aneurysm)    Abdominal hernia    Arthritis    Atherosclerosis of coronary artery    Coronary atherosclerosis    Esophageal reflux    Flatulence/gas pain/belching    High blood pressure    High cholesterol    Indigestion     Lumbar spinal stenosis    Obesity    Osteoarthritis    Sleep apnea    no longer using cpap    Stented coronary artery    Visual impairment    contacts/glasses   [2]   Past Surgical History:  Procedure Laterality Date    Angiogram      Angioplasty (coronary)  Dec. 7, 2018    Bypass surgery      Cabg  12/17/2018    TRIPLE BYPASS    Cardiac catheterization  12/07/2018    DR NEFF    Cath angio  12/07/2018        Colonoscopy  2001, 2006, 2011    Colonoscopy      Colonoscopy N/A 10/22/2021    Procedure: COLONOSCOPY;  Surgeon: Jordy Jackson DO;  Location:  ENDOSCOPY    Colonoscopy,diagnostic N/A 8/5/2016    Procedure: COLONOSCOPY, POSSIBLE BIOPSY, POSSIBLE POLYPECTOMY 27776;  Surgeon: Raghav Guillermo MD;  [facility]    Endovas repair, infrarenl abdom aortic aneurysm/dissect      Hernia surgery  07/10/2020    Robotic repair left inguinal hernia with mesh    Other surgical history  08/20/2019    Robotic-assisted repair right inguinal hernia with mesh   [3]   Family History  Problem Relation Age of Onset    Colon Cancer Father 70    Cancer Father         started as colon cancer-moved to bone then tumor on kidney    Cancer Mother         breast cancer. mets \"all over\"    Breast Cancer Mother     Other (Other) Son         post-op complications passed away age 15    Diabetes Maternal Grandmother     Cancer Maternal Grandfather     Heart Attack Paternal Grandmother     Cancer Brother         lymphoma    Cancer Brother         lung ca   [4]   Social History  Socioeconomic History    Marital status:    Tobacco Use    Smoking status: Former     Types: Cigarettes    Smokeless tobacco: Never    Tobacco comments:     QUIT 35 YRS AGO   Vaping Use    Vaping status: Never Used   Substance and Sexual Activity    Alcohol use: Yes     Comment: around 6 drinks per week    Drug use: No    Sexual activity: Yes     Partners: Female   Other Topics Concern    Caffeine Concern No     Comment:  none    Exercise Yes     Comment: on hold     Seat Belt Yes   [5]   Current Outpatient Medications   Medication Sig Dispense Refill    tamsulosin 0.4 MG Oral Cap Take 1 capsule (0.4 mg total) by mouth every evening. 90 capsule 6    finasteride 5 MG Oral Tab Take 1 tablet (5 mg total) by mouth daily. 90 tablet 6    rosuvastatin 40 MG Oral Tab Take 1 tablet (40 mg total) by mouth nightly. 90 tablet 1    metoprolol succinate ER 50 MG Oral Tablet 24 Hr TAKE 1 TABLET BY MOUTH EVERY DAY 90 tablet 1    Sildenafil Citrate 50 MG Oral Tab Take 1 tablet (50 mg total) by mouth daily as needed for Erectile Dysfunction. 30 tablet 1    celecoxib 200 MG Oral Cap Take 1 capsule (200 mg total) by mouth daily. 90 capsule 1    benzonatate 200 MG Oral Cap Take 1 capsule (200 mg total) by mouth 3 (three) times daily as needed. 30 capsule 0    Multiple Vitamin (MULTIVITAMIN ADULT OR) Take 1 tablet by mouth daily.      Saw Palmetto, Serenoa repens, (SAW PALMETTO OR) Take 1 tablet by mouth daily.      Pantoprazole Sodium 40 MG Oral Tab EC TAKE 1 TABLET(40 MG) BY MOUTH EVERY DAY 90 tablet 0    Coenzyme Q10 (CO Q 10 OR) Take 1 capsule by mouth daily.      aspirin 81 MG Oral Tab Take 1 tablet (81 mg total) by mouth daily.     [6]   Allergies  Allergen Reactions    Hydrocodone NAUSEA ONLY

## 2025-08-06 ENCOUNTER — MED REC SCAN ONLY (OUTPATIENT)
Dept: FAMILY MEDICINE CLINIC | Facility: CLINIC | Age: 70
End: 2025-08-06

## 2025-08-21 DIAGNOSIS — E78.00 PURE HYPERCHOLESTEROLEMIA: ICD-10-CM

## 2025-08-22 RX ORDER — ROSUVASTATIN CALCIUM 40 MG/1
40 TABLET, COATED ORAL NIGHTLY
Qty: 90 TABLET | Refills: 1 | OUTPATIENT
Start: 2025-08-22

## 2025-08-26 ENCOUNTER — LAB ENCOUNTER (OUTPATIENT)
Dept: LAB | Age: 70
End: 2025-08-26
Attending: FAMILY MEDICINE

## 2025-08-26 LAB
ALBUMIN SERPL-MCNC: 4.4 G/DL (ref 3.2–4.8)
ALBUMIN/GLOB SERPL: 1.7 (ref 1–2)
ALP LIVER SERPL-CCNC: 52 U/L (ref 45–117)
ALT SERPL-CCNC: 19 U/L (ref 10–49)
ANION GAP SERPL CALC-SCNC: 9 MMOL/L (ref 0–18)
AST SERPL-CCNC: 28 U/L (ref ?–34)
BASOPHILS # BLD AUTO: 0.02 X10(3) UL (ref 0–0.2)
BASOPHILS NFR BLD AUTO: 0.3 %
BILIRUB SERPL-MCNC: 0.9 MG/DL (ref 0.2–1.1)
BILIRUB UR QL STRIP.AUTO: NEGATIVE
BUN BLD-MCNC: 12 MG/DL (ref 9–23)
CALCIUM BLD-MCNC: 9.3 MG/DL (ref 8.7–10.6)
CHLORIDE SERPL-SCNC: 104 MMOL/L (ref 98–112)
CHOLEST SERPL-MCNC: 139 MG/DL (ref ?–200)
CLARITY UR REFRACT.AUTO: CLEAR
CO2 SERPL-SCNC: 28 MMOL/L (ref 21–32)
CREAT BLD-MCNC: 1.04 MG/DL (ref 0.7–1.3)
EGFRCR SERPLBLD CKD-EPI 2021: 78 ML/MIN/1.73M2 (ref 60–?)
EOSINOPHIL # BLD AUTO: 0.28 X10(3) UL (ref 0–0.7)
EOSINOPHIL NFR BLD AUTO: 4.7 %
ERYTHROCYTE [DISTWIDTH] IN BLOOD BY AUTOMATED COUNT: 12.2 %
FASTING PATIENT LIPID ANSWER: YES
FASTING STATUS PATIENT QL REPORTED: YES
GLOBULIN PLAS-MCNC: 2.6 G/DL (ref 2–3.5)
GLUCOSE BLD-MCNC: 88 MG/DL (ref 70–99)
GLUCOSE UR STRIP.AUTO-MCNC: NORMAL MG/DL
HCT VFR BLD AUTO: 47.9 % (ref 39–53)
HDLC SERPL-MCNC: 42 MG/DL (ref 40–59)
HGB BLD-MCNC: 16 G/DL (ref 13–17.5)
IMM GRANULOCYTES # BLD AUTO: 0.02 X10(3) UL (ref 0–1)
IMM GRANULOCYTES NFR BLD: 0.3 %
KETONES UR STRIP.AUTO-MCNC: NEGATIVE MG/DL
LDLC SERPL CALC-MCNC: 72 MG/DL (ref ?–100)
LEUKOCYTE ESTERASE UR QL STRIP.AUTO: NEGATIVE
LYMPHOCYTES # BLD AUTO: 1.06 X10(3) UL (ref 1–4)
LYMPHOCYTES NFR BLD AUTO: 17.6 %
MCH RBC QN AUTO: 29.3 PG (ref 26–34)
MCHC RBC AUTO-ENTMCNC: 33.4 G/DL (ref 31–37)
MCV RBC AUTO: 87.7 FL (ref 80–100)
MONOCYTES # BLD AUTO: 0.87 X10(3) UL (ref 0.1–1)
MONOCYTES NFR BLD AUTO: 14.5 %
NEUTROPHILS # BLD AUTO: 3.76 X10 (3) UL (ref 1.5–7.7)
NEUTROPHILS # BLD AUTO: 3.76 X10(3) UL (ref 1.5–7.7)
NEUTROPHILS NFR BLD AUTO: 62.6 %
NITRITE UR QL STRIP.AUTO: NEGATIVE
NONHDLC SERPL-MCNC: 97 MG/DL (ref ?–130)
OSMOLALITY SERPL CALC.SUM OF ELEC: 291 MOSM/KG (ref 275–295)
PH UR STRIP.AUTO: 5 (ref 5–8)
PLATELET # BLD AUTO: 173 10(3)UL (ref 150–450)
POTASSIUM SERPL-SCNC: 4.1 MMOL/L (ref 3.5–5.1)
PROT SERPL-MCNC: 7 G/DL (ref 5.7–8.2)
PROT UR STRIP.AUTO-MCNC: NEGATIVE MG/DL
RBC # BLD AUTO: 5.46 X10(6)UL (ref 3.8–5.8)
RBC UR QL AUTO: NEGATIVE
SODIUM SERPL-SCNC: 141 MMOL/L (ref 136–145)
SP GR UR STRIP.AUTO: 1.02 (ref 1–1.03)
TRIGL SERPL-MCNC: 141 MG/DL (ref 30–149)
TSI SER-ACNC: 2.08 UIU/ML (ref 0.55–4.78)
UROBILINOGEN UR STRIP.AUTO-MCNC: NORMAL MG/DL
VLDLC SERPL CALC-MCNC: 22 MG/DL (ref 0–30)
WBC # BLD AUTO: 6 X10(3) UL (ref 4–11)

## 2025-08-26 PROCEDURE — 80053 COMPREHEN METABOLIC PANEL: CPT | Performed by: FAMILY MEDICINE

## 2025-08-26 PROCEDURE — 81003 URINALYSIS AUTO W/O SCOPE: CPT | Performed by: FAMILY MEDICINE

## 2025-08-26 PROCEDURE — 84443 ASSAY THYROID STIM HORMONE: CPT | Performed by: FAMILY MEDICINE

## 2025-08-26 PROCEDURE — 85025 COMPLETE CBC W/AUTO DIFF WBC: CPT | Performed by: FAMILY MEDICINE

## 2025-08-26 PROCEDURE — 80061 LIPID PANEL: CPT | Performed by: FAMILY MEDICINE

## 2025-08-26 PROCEDURE — 36415 COLL VENOUS BLD VENIPUNCTURE: CPT | Performed by: FAMILY MEDICINE

## (undated) DIAGNOSIS — I10 ESSENTIAL HYPERTENSION, BENIGN: ICD-10-CM

## (undated) DIAGNOSIS — N52.9 ERECTILE DYSFUNCTION, UNSPECIFIED ERECTILE DYSFUNCTION TYPE: Primary | ICD-10-CM

## (undated) DIAGNOSIS — I71.4 ABDOMINAL AORTIC ANEURYSM (AAA) WITHOUT RUPTURE (HCC): ICD-10-CM

## (undated) DIAGNOSIS — J01.00 ACUTE NON-RECURRENT MAXILLARY SINUSITIS: Primary | ICD-10-CM

## (undated) DIAGNOSIS — E78.5 HYPERLIPIDEMIA, UNSPECIFIED HYPERLIPIDEMIA TYPE: ICD-10-CM

## (undated) DIAGNOSIS — I25.10 CORONARY ARTERY DISEASE INVOLVING NATIVE HEART WITHOUT ANGINA PECTORIS, UNSPECIFIED VESSEL OR LESION TYPE: ICD-10-CM

## (undated) DEVICE — STERILE POLYISOPRENE POWDER-FREE SURGICAL GLOVES: Brand: PROTEXIS

## (undated) DEVICE — GLOVE SURG TRIUMPH SZ 8

## (undated) DEVICE — UNDYED BRAIDED (POLYGLACTIN 910), SYNTHETIC ABSORBABLE SUTURE: Brand: COATED VICRYL

## (undated) DEVICE — SUTURE WIRE DOUBLE STERNOTOMY

## (undated) DEVICE — SUPPORTER ATHL LG LG SPNS SPRT

## (undated) DEVICE — Device

## (undated) DEVICE — SKIN AFFIX .4ML

## (undated) DEVICE — MEDI-VAC NON-CONDUCTIVE SUCTION TUBING: Brand: CARDINAL HEALTH

## (undated) DEVICE — SOL LACT RINGERS 1000ML

## (undated) DEVICE — GAMMEX® NON-LATEX PI ORTHO SIZE 8.5, STERILE POLYISOPRENE POWDER-FREE SURGICAL GLOVE: Brand: GAMMEX

## (undated) DEVICE — VISUALIZATION SYSTEM: Brand: CLEARIFY

## (undated) DEVICE — DRAPE SLUSH/WARMER W/DISC

## (undated) DEVICE — RADIFOCUS GLIDEWIRE: Brand: GLIDEWIRE

## (undated) DEVICE — TIBURON DRAPE TOWELS: Brand: CONVERTORS

## (undated) DEVICE — 40580 - THE PINK PAD - ADVANCED TRENDELENBURG POSITIONING KIT: Brand: 40580 - THE PINK PAD - ADVANCED TRENDELENBURG POSITIONING KIT

## (undated) DEVICE — SUTURE VLOC 90 2-0 9\" 2145

## (undated) DEVICE — SUTURE VICRYL 3-0 PS-1

## (undated) DEVICE — SUTURE VICRYL 2-0 CT-1

## (undated) DEVICE — COVER,MAYO STAND,STERILE: Brand: MEDLINE

## (undated) DEVICE — SPONGE LAP 18X18 XRAY STRL

## (undated) DEVICE — CANNULA SEAL

## (undated) DEVICE — CELL SAVER 5/5+ BOWL KIT-225ML: Brand: HAEMONETICS CELL SAVER 5/5+ SYSTEMS

## (undated) DEVICE — STANDARD HYPODERMIC NEEDLE,POLYPROPYLENE HUB: Brand: MONOJECT

## (undated) DEVICE — HEMOCLIP HORIZON SM MULTI

## (undated) DEVICE — SUTURE PROLENE 7-0 CC

## (undated) DEVICE — 1200CC GUARDIAN II: Brand: GUARDIAN

## (undated) DEVICE — ENDOSCOPY PACK - LOWER: Brand: MEDLINE INDUSTRIES, INC.

## (undated) DEVICE — MONOPOLAR CURVED SCISSORS: Brand: ENDOWRIST

## (undated) DEVICE — FENESTRATED BIPOLAR FORCEPS: Brand: ENDOWRIST

## (undated) DEVICE — 3M™ IOBAN™ 2 ANTIMICROBIAL INCISE DRAPE 6651EZ: Brand: IOBAN™ 2

## (undated) DEVICE — BLADELESS OBTURATOR: Brand: WECK VISTA

## (undated) DEVICE — SUTURE PROLENE 6-0 C-1

## (undated) DEVICE — Device: Brand: DEFENDO AIR/WATER/SUCTION AND BIOPSY VALVE

## (undated) DEVICE — CELL SAVER RESERVOIR BRAT

## (undated) DEVICE — INDICATED FOR SURGICAL CLAMPING DURING CARDIOVASCULAR PERIPHERAL VASCULAR, AND GENERAL SURGERY.: Brand: SOFT/FIBRA® SPRING CLIP

## (undated) DEVICE — COVER WAND RF DETECT

## (undated) DEVICE — PAD SACRAL SPAN AID

## (undated) DEVICE — SOL  .9 1000ML BTL

## (undated) DEVICE — TRAY ENDOVEIN KTV16 HARVESTING

## (undated) DEVICE — KIT MICROPUNCTURE STIFF VSI

## (undated) DEVICE — ANGIO PACK-LF: Brand: MEDLINE INDUSTRIES, INC.

## (undated) DEVICE — TRANSPOSAL ULTRAFLEX DUO/QUAD ULTRA CART MANIFOLD

## (undated) DEVICE — SPONGE STICK WITH PVP-I: Brand: KENDALL

## (undated) DEVICE — STERILE SYNTHETIC POLYISOPRENE POWDER-FREE SURGICAL GLOVES WITH HYDROGEL COATING, SMOOTH FINISH, STRAIGHT FINGER: Brand: PROTEXIS

## (undated) DEVICE — OPEN HEART: Brand: MEDLINE INDUSTRIES, INC.

## (undated) DEVICE — MEGA SUTURECUT ND: Brand: ENDOWRIST

## (undated) DEVICE — CELL SAVER BAG 600ML 4R2023

## (undated) DEVICE — COLUMN DRAPE

## (undated) DEVICE — TIP COVER ACCESSORY

## (undated) DEVICE — ROBOTIC GENERAL: Brand: MEDLINE INDUSTRIES, INC.

## (undated) DEVICE — PINNACLE INTRODUCER SHEATH: Brand: PINNACLE

## (undated) DEVICE — KENDALL SCD EXPRESS SLEEVES, KNEE LENGTH, MEDIUM: Brand: KENDALL SCD

## (undated) DEVICE — GAUZE SPONGES,12 PLY: Brand: CURITY

## (undated) DEVICE — SOL  .9 500ML

## (undated) DEVICE — HEMOCLIP HORIZON LG 004200

## (undated) DEVICE — FIXED CORE WIRE GUIDE SAFE-T-J, CURVED: Brand: COOK

## (undated) DEVICE — MEDI-VAC SUCTION HANDLE REGULAR CAPACITY: Brand: CARDINAL HEALTH

## (undated) DEVICE — 3M(TM) TEGADERM(TM) TRANSPARENT FILM DRESSING FRAME STYLE 9505W: Brand: 3M™ TEGADERM™

## (undated) DEVICE — DRYSEAL FLEXSHEATH 16FR 33CM

## (undated) DEVICE — GAUZE SPONGES,USP TYPE VII GAUZE, 12 PLY: Brand: CURITY

## (undated) DEVICE — FILTERLINE NASAL ADULT O2/CO2

## (undated) DEVICE — PDS II VLT 0 107CM AG ST3: Brand: ENDOLOOP

## (undated) DEVICE — BEACON TIP TORCON NB ADVANTAGE CATHETER: Brand: BEACON TIP TORCON NB

## (undated) DEVICE — AMPLATZ SUPER STIFF 035X180

## (undated) DEVICE — GAMMEX® PI HYBRID SIZE 6.5, STERILE POWDER-FREE SURGICAL GLOVE, POLYISOPRENE AND NEOPRENE BLEND: Brand: GAMMEX

## (undated) DEVICE — INSUFFLATION NEEDLE TO ESTABLISH PNEUMOPERITONEUM.: Brand: INSUFFLATION NEEDLE

## (undated) DEVICE — SUTURE POLYDEK 2-0

## (undated) DEVICE — SUTURE SILK 2-0

## (undated) DEVICE — CELL SAVER TUBING BRAT

## (undated) DEVICE — MOLDING OCCULSION BALLOON

## (undated) DEVICE — CV PACK-LF: Brand: MEDLINE INDUSTRIES, INC.

## (undated) DEVICE — PERCLOSE PROGLIDE™ SUTURE-MEDIATED CLOSURE SYSTEM: Brand: PERCLOSE PROGLIDE™

## (undated) DEVICE — LAP CHOLE/APPY CDS-LF: Brand: MEDLINE INDUSTRIES, INC.

## (undated) DEVICE — ARM DRAPE

## (undated) DEVICE — BLADE SW 32X6MM  STRNM

## (undated) DEVICE — GOWN,SIRUS,FABRIC-REINFORCED,X-LARGE: Brand: MEDLINE

## (undated) DEVICE — CATH PIGTAIL SIZING 20M UHF

## (undated) DEVICE — DRYSEAL FLEXSHEATH 12FR 33CM

## (undated) NOTE — MR AVS SNAPSHOT
[facility], [address] [id number] [phone]               Thank you for choosing us for your health care visit with Soto Kohli MD.  We are glad to serve you and happy to provide you with this atkins atorvastatin 40 MG Tabs   TAKE 1 TABLET BY MOUTH EVERY DAY   Commonly known as:  LIPITOR           CoQ10 100 MG Caps   Take 1 capsule by mouth daily. Etodolac 500 MG Tabs   Take 1 tablet (500 mg total) by mouth 2 (two) times daily.    Commonly kn

## (undated) NOTE — MR AVS SNAPSHOT
[facility]  [address] [facility],[address], [address]  [address] [address] [phone]               Thank you for choosing us for your health care visit with Gerson Johnson MD.  We are glad to serve you and happy to provide you with this Take 1 tablet by mouth 2 (two) times daily. Commonly known as:  AUGMENTIN           Atorvastatin Calcium 40 MG Tabs   TAKE 1 TABLET BY MOUTH EVERY DAY   Commonly known as:  LIPITOR           CoQ10 100 MG Caps   Take 1 capsule by mouth daily.            Et Visit [facility] online at  [email or web address]

## (undated) NOTE — LETTER
Gabby Singh 182 6 13[facility]  [place], [address] [place] [address]    Consent for Operation  Date: __________________                                Time: _______________    1.  I authorize the performance upon Ana Rosa Wilson the following operation:  Procedure procedure has been videotaped, the surgeon will obtain the original videotape. The hospital will not be responsible for storage or maintenance of this tape.   7. For the purpose of advancing medical education, I consent to the admittance of observers to the STATEMENTS REQUIRING INSERTION OR COMPLETION WERE FILLED IN.     Signature of Patient:   ___________________________    When the patient is a minor or mentally incompetent to give consent:  Signature of person authorized to consent for patient: ____________ supplements, and pills I can buy without a prescription (including street drugs/illegal medications). Failure to inform my anesthesiologist about these medicines may increase my risk of anesthetic complications. iv.  If I am allergic to anything or have ha Anesthesiologist Signature     Date   Time  I have discussed the procedure and information above with the patient (or patient’s representative) and answered their questions. The patient or their representative has agreed to have anesthesia services.     ___

## (undated) NOTE — LETTER
Frances Orr M.D., F.A.C.S. Meño Bhatia M.D., F.A.C.S. Pete Ho M.D., Salma Russell. KARLEY Copeland M.D., F.A.C.S. Roseann Leonard. Nilda Young M.D., F.A.C.S. Oksana Liao M.D. GENA Ruggiero M.D., F. chance to have all of your questions and concerns answered. If there are any issues which have not been adequately addressed, we ask you to bring them forward so that we can thoroughly address them.     A patient who is fully informed and understands their treatment, among other options and the risks and benefits of the different treatment options:    Yes _____ No _____    A [facility] surgeon as explained to me that if I should so desire, he/she is willing to explain my case and the surgical and non-surgical optio

## (undated) NOTE — LETTER
Amina Owens M.D., F.A.C.S. Sumeet Rush M.D., F.A.C.S. Saloni Redding M.D., Carloz Madrigal. KARLEY Blackman M.D., F.A.C.S. Pablo Vang. Maris Schwartz M.D., F.A.C.S. WADE Arora M. Corrin Ax A.D. Travis Spare, M.D., F. concerns answered. If there are any issues which have not been adequately addressed, we ask you to bring them forward so that we can thoroughly address them.     A patient who is fully informed and understands their condition and options for treatment, as w treatment options:    Yes _____ No _____    A CSA surgeon as explained to me that if I should so desire, he/she is willing to explain my case and the surgical and non-surgical options to family members:             Yes _____ No _____    A [facility]SA surgeon has an

## (undated) NOTE — LETTER
[facility] [address], [address]  Consent for Procedure/Sedation    Date:     Time:       1.  I authorize the performance upon Meghan Varner the following:cardiac catheterization, left ventricular cineangiography, bilateral kellee period, the physician will determine when the applicable recovery period ends for purposes of reinstating the Do Not Resuscitate (DNR) order.     Signature of Patient: ____________________________________________________    Signature of person authorized

## (undated) NOTE — ED AVS SNAPSHOT
[facility] in [address]    [address] [facility]    Phone:  [phone]    Fax:  [phone]           Any Pryor   MRN: [id number]    Department:  THE [facility] in [address] [place] [address],[address] Floor   Date of Visit:  1/26/2017 may not be covered by your plan. Please contact your insurance company to determine coverage for follow-up care and referrals. [facility]  [address] [facility] & [facility], [address] [place] [address]  [phone] eFrnando [address]  [address]  Na prescription right away and begin taking the medication(s) as directed. If the Immediate Care Provider has read X-rays, these will be re-interpreted by a radiologist.  If there is a significant change in your reading, you will be contacted.  Please make Medicaid plans. To get signed up and covered, please call [phone] and ask to get set up for an insurance coverage that is in-network with Jairo Whitley.         Imaging Results         XR FINGER(S) (MIN 2 VIEWS), RIGHT 2ND (CPT=73140) (Isabelle Summaries. If you've been to the Emergency Department or your doctor's office, you can view your past visit information in [employer] by going to Visits < Visit Summaries. [employer] questions? Call [phone] for help.   [employer] is NOT to be used for urge

## (undated) NOTE — MR AVS SNAPSHOT
[facility], [address] [address] [id number]               Thank you for choosing us for your health care visit with Juan Mac MD.  We are glad to serve you and happy to provide you with this atkins TAKE 1 TABLET BY MOUTH EVERY MORNING BEFORE BREAKFAST AS NEEDED   Commonly known as:  PROTONIX                Where to Get Your Medications      These medications were sent to [facility] [address], [address] [facility] active are less likely to develop some chronic diseases than adults who are inactive.      HOW TO GET STARTED: HOW TO STAY MOTIVATED:   Start activities slowly and build up over time Do what you like   Get your heart pumping – brisk walking, biking, swimmin

## (undated) NOTE — LETTER
2/14/2018    [address]    Dear Mr. Shwetha Nicole are due for an appointment with your provider for a HTN follow up. It is important that we reach you to discuss your healthcare needs. Please contact our office at your earliest convenience. Also,  Did you know that you can receive test result information and reminders securely on line through [address] [place] [address] [address]? To register for [employer], open your Internet browser and go to [email or web address] and click \"sign up now\". Follow the on-screen instructions to complete your registration. Thank you for your prompt attention to this matter. You may reach our office at [phone].      Sincerely,      [employer] and Staff

## (undated) NOTE — Clinical Note
Pt is coming for hospital follow up on 1/3/19. Sooner appt times were offered but the pt declined. Pt stated he is doing okay since d/. He denies s/s of infection and feels his pain is managed with Tramadol and OTC Tylenol.  Pt stated he is using the incent

## (undated) NOTE — MR AVS SNAPSHOT
[facility] [address] [place] [address]  [address] [facility] [address]  [phone]               Thank you for choosing us for your health care visit with SHELLY Sidhu. We are glad to serve you and happy to provide you with this summary of your visit. Commonly known as:  [address] [facility] can access your MyChart to more actively manage your health care and view more details from this visit by going to [email or web address].   If you've recently had a stay at